# Patient Record
Sex: FEMALE | Race: WHITE | Employment: FULL TIME | URBAN - METROPOLITAN AREA
[De-identification: names, ages, dates, MRNs, and addresses within clinical notes are randomized per-mention and may not be internally consistent; named-entity substitution may affect disease eponyms.]

---

## 2017-01-03 ENCOUNTER — OFFICE VISIT (OUTPATIENT)
Dept: FAMILY MEDICINE CLINIC | Age: 62
End: 2017-01-03

## 2017-01-03 VITALS
WEIGHT: 251 LBS | HEART RATE: 72 BPM | TEMPERATURE: 97.6 F | DIASTOLIC BLOOD PRESSURE: 64 MMHG | SYSTOLIC BLOOD PRESSURE: 128 MMHG | BODY MASS INDEX: 40.34 KG/M2 | RESPIRATION RATE: 24 BRPM | HEIGHT: 66 IN

## 2017-01-03 DIAGNOSIS — E06.3 HYPOTHYROIDISM DUE TO HASHIMOTO'S THYROIDITIS: ICD-10-CM

## 2017-01-03 DIAGNOSIS — E03.8 HYPOTHYROIDISM DUE TO HASHIMOTO'S THYROIDITIS: ICD-10-CM

## 2017-01-03 DIAGNOSIS — E55.9 VITAMIN D DEFICIENCY: ICD-10-CM

## 2017-01-03 DIAGNOSIS — M05.711 RHEUMATOID ARTHRITIS INVOLVING RIGHT SHOULDER WITH POSITIVE RHEUMATOID FACTOR (HCC): Primary | ICD-10-CM

## 2017-01-03 DIAGNOSIS — I10 ESSENTIAL HYPERTENSION: ICD-10-CM

## 2017-01-03 PROCEDURE — 99213 OFFICE O/P EST LOW 20 MIN: CPT | Performed by: FAMILY MEDICINE

## 2017-01-03 RX ORDER — PREGABALIN 150 MG/1
CAPSULE ORAL
Qty: 270 CAPSULE | Refills: 1 | Status: SHIPPED | OUTPATIENT
Start: 2017-01-03 | End: 2017-06-14 | Stop reason: SDUPTHER

## 2017-01-03 RX ORDER — CELECOXIB 200 MG/1
CAPSULE ORAL
Qty: 180 CAPSULE | Refills: 1 | Status: SHIPPED | OUTPATIENT
Start: 2017-01-03 | End: 2017-07-20 | Stop reason: SDUPTHER

## 2017-01-03 RX ORDER — ERGOCALCIFEROL 1.25 MG/1
CAPSULE ORAL
Qty: 12 CAPSULE | Refills: 0 | Status: SHIPPED | OUTPATIENT
Start: 2017-01-03 | End: 2018-05-23 | Stop reason: SDUPTHER

## 2017-01-03 RX ORDER — IBUPROFEN 800 MG/1
TABLET ORAL
Qty: 90 TABLET | Refills: 1 | Status: SHIPPED | OUTPATIENT
Start: 2017-01-03 | End: 2017-08-19 | Stop reason: SDUPTHER

## 2017-01-03 RX ORDER — TRAMADOL HYDROCHLORIDE 50 MG/1
TABLET ORAL
Qty: 360 TABLET | Refills: 1 | Status: SHIPPED | OUTPATIENT
Start: 2017-01-03 | End: 2017-06-14 | Stop reason: SDUPTHER

## 2017-01-03 RX ORDER — HYDROXYCHLOROQUINE SULFATE 200 MG/1
TABLET, FILM COATED ORAL
Qty: 180 TABLET | Refills: 1 | Status: SHIPPED | OUTPATIENT
Start: 2017-01-03 | End: 2017-07-20 | Stop reason: SDUPTHER

## 2017-01-03 RX ORDER — ESTRADIOL 0.5 MG/1
TABLET ORAL
Qty: 90 TABLET | Refills: 1 | Status: SHIPPED | OUTPATIENT
Start: 2017-01-03 | End: 2017-07-20 | Stop reason: SDUPTHER

## 2017-01-03 RX ORDER — RANITIDINE 150 MG/1
TABLET ORAL
Qty: 180 TABLET | Refills: 1 | Status: SHIPPED | OUTPATIENT
Start: 2017-01-03 | End: 2017-07-20 | Stop reason: SDUPTHER

## 2017-01-03 RX ORDER — TRIAMTERENE AND HYDROCHLOROTHIAZIDE 75; 50 MG/1; MG/1
TABLET ORAL
Qty: 90 TABLET | Refills: 1 | Status: SHIPPED | OUTPATIENT
Start: 2017-01-03 | End: 2017-07-20 | Stop reason: SDUPTHER

## 2017-01-03 ASSESSMENT — ENCOUNTER SYMPTOMS
NAUSEA: 0
SINUS PAIN: 1
SWOLLEN GLANDS: 0
CONSTIPATION: 0
EYES NEGATIVE: 1
SORE THROAT: 1
ABDOMINAL PAIN: 0
SINUS PRESSURE: 1
VOICE CHANGE: 1
COUGH: 1
SHORTNESS OF BREATH: 0
TROUBLE SWALLOWING: 0
WHEEZING: 1
DIARRHEA: 0
VOMITING: 0
RHINORRHEA: 1

## 2017-01-03 ASSESSMENT — PATIENT HEALTH QUESTIONNAIRE - PHQ9
SUM OF ALL RESPONSES TO PHQ9 QUESTIONS 1 & 2: 1
2. FEELING DOWN, DEPRESSED OR HOPELESS: 0
1. LITTLE INTEREST OR PLEASURE IN DOING THINGS: 1
SUM OF ALL RESPONSES TO PHQ QUESTIONS 1-9: 1

## 2017-01-13 LAB
ALBUMIN SERPL-MCNC: NORMAL G/DL
ALP BLD-CCNC: NORMAL U/L
ALT SERPL-CCNC: 30 U/L
AST SERPL-CCNC: 29 U/L
BASOPHILS ABSOLUTE: NORMAL /ΜL
BASOPHILS RELATIVE PERCENT: NORMAL %
BILIRUB SERPL-MCNC: NORMAL MG/DL (ref 0.1–1.4)
BUN BLDV-MCNC: NORMAL MG/DL
CALCIUM SERPL-MCNC: NORMAL MG/DL
CHLORIDE BLD-SCNC: 101 MMOL/L
CHOLESTEROL, TOTAL: 149 MG/DL
CHOLESTEROL/HDL RATIO: 3.2
CO2: NORMAL MMOL/L
CREAT SERPL-MCNC: 0.96 MG/DL
EOSINOPHILS ABSOLUTE: NORMAL /ΜL
EOSINOPHILS RELATIVE PERCENT: NORMAL %
GFR CALCULATED: NORMAL
GLUCOSE BLD-MCNC: 93 MG/DL
HCT VFR BLD CALC: 42.5 % (ref 36–46)
HDLC SERPL-MCNC: 46 MG/DL (ref 35–70)
HEMOGLOBIN: 13.3 G/DL (ref 12–16)
LDL CHOLESTEROL CALCULATED: 78 MG/DL (ref 0–160)
LYMPHOCYTES ABSOLUTE: NORMAL /ΜL
LYMPHOCYTES RELATIVE PERCENT: NORMAL %
MCH RBC QN AUTO: NORMAL PG
MCHC RBC AUTO-ENTMCNC: NORMAL G/DL
MCV RBC AUTO: NORMAL FL
MONOCYTES ABSOLUTE: NORMAL /ΜL
MONOCYTES RELATIVE PERCENT: NORMAL %
NEUTROPHILS ABSOLUTE: NORMAL /ΜL
NEUTROPHILS RELATIVE PERCENT: NORMAL %
PDW BLD-RTO: NORMAL %
PLATELET # BLD: NORMAL K/ΜL
PMV BLD AUTO: NORMAL FL
POTASSIUM SERPL-SCNC: 3.6 MMOL/L
RBC # BLD: 4.83 10^6/ΜL
SODIUM BLD-SCNC: 140 MMOL/L
T4 FREE: 0.7
TOTAL PROTEIN: NORMAL
TRIGL SERPL-MCNC: 125 MG/DL
TSH SERPL DL<=0.05 MIU/L-ACNC: 2.43 UIU/ML
VITAMIN D 25-HYDROXY: 26
VITAMIN D2, 25 HYDROXY: NORMAL
VITAMIN D3,25 HYDROXY: NORMAL
VLDLC SERPL CALC-MCNC: 25 MG/DL
WBC # BLD: 5.6 10^3/ML

## 2017-01-19 ENCOUNTER — OFFICE VISIT (OUTPATIENT)
Dept: FAMILY MEDICINE CLINIC | Age: 62
End: 2017-01-19

## 2017-01-19 VITALS
DIASTOLIC BLOOD PRESSURE: 70 MMHG | SYSTOLIC BLOOD PRESSURE: 126 MMHG | HEIGHT: 66 IN | TEMPERATURE: 98.2 F | RESPIRATION RATE: 16 BRPM | HEART RATE: 60 BPM

## 2017-01-19 DIAGNOSIS — G89.29 CHRONIC BILATERAL LOW BACK PAIN WITHOUT SCIATICA: Primary | ICD-10-CM

## 2017-01-19 DIAGNOSIS — M05.711 RHEUMATOID ARTHRITIS INVOLVING RIGHT SHOULDER WITH POSITIVE RHEUMATOID FACTOR (HCC): ICD-10-CM

## 2017-01-19 DIAGNOSIS — M54.50 CHRONIC BILATERAL LOW BACK PAIN WITHOUT SCIATICA: Primary | ICD-10-CM

## 2017-01-19 DIAGNOSIS — M25.551 PAIN OF RIGHT HIP JOINT: ICD-10-CM

## 2017-01-19 DIAGNOSIS — I10 ESSENTIAL HYPERTENSION: ICD-10-CM

## 2017-01-19 PROCEDURE — 99214 OFFICE O/P EST MOD 30 MIN: CPT | Performed by: FAMILY MEDICINE

## 2017-01-19 RX ORDER — PREDNISONE 10 MG/1
TABLET ORAL
Qty: 62 TABLET | Refills: 0 | Status: SHIPPED | OUTPATIENT
Start: 2017-01-19 | End: 2017-06-14 | Stop reason: ALTCHOICE

## 2017-01-19 RX ORDER — LEVOTHYROXINE SODIUM 0.05 MG/1
50 TABLET ORAL DAILY
Qty: 90 TABLET | Refills: 1 | Status: SHIPPED | OUTPATIENT
Start: 2017-01-19 | End: 2017-07-20 | Stop reason: SDUPTHER

## 2017-01-24 ENCOUNTER — TELEPHONE (OUTPATIENT)
Dept: FAMILY MEDICINE CLINIC | Age: 62
End: 2017-01-24

## 2017-01-25 DIAGNOSIS — M54.5 LOW BACK PAIN, UNSPECIFIED BACK PAIN LATERALITY, UNSPECIFIED CHRONICITY, WITH SCIATICA PRESENCE UNSPECIFIED: Primary | ICD-10-CM

## 2017-01-25 DIAGNOSIS — M25.559 ARTHRALGIA OF HIP, UNSPECIFIED LATERALITY: ICD-10-CM

## 2017-02-01 DIAGNOSIS — M05.711 RHEUMATOID ARTHRITIS INVOLVING RIGHT SHOULDER WITH POSITIVE RHEUMATOID FACTOR (HCC): ICD-10-CM

## 2017-02-01 DIAGNOSIS — E55.9 VITAMIN D DEFICIENCY: ICD-10-CM

## 2017-02-01 DIAGNOSIS — E06.3 HYPOTHYROIDISM DUE TO HASHIMOTO'S THYROIDITIS: ICD-10-CM

## 2017-02-01 DIAGNOSIS — M25.559 ARTHRALGIA OF HIP, UNSPECIFIED LATERALITY: ICD-10-CM

## 2017-02-01 DIAGNOSIS — I10 ESSENTIAL HYPERTENSION: ICD-10-CM

## 2017-02-01 DIAGNOSIS — M54.5 LOW BACK PAIN, UNSPECIFIED BACK PAIN LATERALITY, UNSPECIFIED CHRONICITY, WITH SCIATICA PRESENCE UNSPECIFIED: ICD-10-CM

## 2017-02-01 DIAGNOSIS — E03.8 HYPOTHYROIDISM DUE TO HASHIMOTO'S THYROIDITIS: ICD-10-CM

## 2017-02-06 RX ORDER — DULOXETIN HYDROCHLORIDE 30 MG/1
CAPSULE, DELAYED RELEASE ORAL
Qty: 30 CAPSULE | Refills: 3 | Status: SHIPPED | OUTPATIENT
Start: 2017-02-06 | End: 2017-03-13 | Stop reason: SDUPTHER

## 2017-02-07 ENCOUNTER — TELEPHONE (OUTPATIENT)
Dept: FAMILY MEDICINE CLINIC | Age: 62
End: 2017-02-07

## 2017-02-09 PROBLEM — M70.61 TROCHANTERIC BURSITIS OF RIGHT HIP: Status: ACTIVE | Noted: 2017-02-09

## 2017-03-13 RX ORDER — DULOXETIN HYDROCHLORIDE 30 MG/1
CAPSULE, DELAYED RELEASE ORAL
Qty: 90 CAPSULE | Refills: 1 | Status: SHIPPED | OUTPATIENT
Start: 2017-03-13 | End: 2017-09-19 | Stop reason: SDUPTHER

## 2017-04-11 PROBLEM — M51.36 DEGENERATION OF LUMBAR INTERVERTEBRAL DISC: Status: ACTIVE | Noted: 2017-04-11

## 2017-04-11 PROBLEM — M51.369 DEGENERATION OF LUMBAR INTERVERTEBRAL DISC: Status: ACTIVE | Noted: 2017-04-11

## 2017-04-11 PROBLEM — M47.817 LUMBOSACRAL SPONDYLOSIS WITHOUT MYELOPATHY: Status: ACTIVE | Noted: 2017-04-11

## 2017-05-08 ENCOUNTER — TELEPHONE (OUTPATIENT)
Dept: FAMILY MEDICINE CLINIC | Age: 62
End: 2017-05-08

## 2017-06-12 RX ORDER — TRAMADOL HYDROCHLORIDE 50 MG/1
TABLET ORAL
Qty: 360 TABLET | Refills: 1 | OUTPATIENT
Start: 2017-06-12

## 2017-06-14 ENCOUNTER — OFFICE VISIT (OUTPATIENT)
Dept: FAMILY MEDICINE CLINIC | Age: 62
End: 2017-06-14

## 2017-06-14 VITALS
DIASTOLIC BLOOD PRESSURE: 70 MMHG | OXYGEN SATURATION: 96 % | TEMPERATURE: 97 F | HEART RATE: 70 BPM | HEIGHT: 66 IN | SYSTOLIC BLOOD PRESSURE: 120 MMHG

## 2017-06-14 DIAGNOSIS — M47.817 LUMBOSACRAL SPONDYLOSIS WITHOUT MYELOPATHY: ICD-10-CM

## 2017-06-14 DIAGNOSIS — Z51.81 THERAPEUTIC DRUG MONITORING: Primary | ICD-10-CM

## 2017-06-14 DIAGNOSIS — G89.29 CHRONIC LOW BACK PAIN, UNSPECIFIED BACK PAIN LATERALITY, WITH SCIATICA PRESENCE UNSPECIFIED: ICD-10-CM

## 2017-06-14 DIAGNOSIS — M05.711 RHEUMATOID ARTHRITIS INVOLVING RIGHT SHOULDER WITH POSITIVE RHEUMATOID FACTOR (HCC): ICD-10-CM

## 2017-06-14 DIAGNOSIS — M51.36 DEGENERATION OF LUMBAR INTERVERTEBRAL DISC: ICD-10-CM

## 2017-06-14 DIAGNOSIS — M54.5 CHRONIC LOW BACK PAIN, UNSPECIFIED BACK PAIN LATERALITY, WITH SCIATICA PRESENCE UNSPECIFIED: ICD-10-CM

## 2017-06-14 PROCEDURE — 99214 OFFICE O/P EST MOD 30 MIN: CPT | Performed by: FAMILY MEDICINE

## 2017-06-14 RX ORDER — PREGABALIN 150 MG/1
CAPSULE ORAL
Qty: 270 CAPSULE | Refills: 1 | Status: SHIPPED | OUTPATIENT
Start: 2017-06-14 | End: 2018-01-23 | Stop reason: SDUPTHER

## 2017-06-14 RX ORDER — TRAMADOL HYDROCHLORIDE 50 MG/1
TABLET ORAL
Qty: 360 TABLET | Refills: 1 | Status: SHIPPED | OUTPATIENT
Start: 2017-06-14 | End: 2017-10-18 | Stop reason: SDUPTHER

## 2017-06-14 RX ORDER — HYDROCODONE BITARTRATE AND ACETAMINOPHEN 5; 325 MG/1; MG/1
TABLET ORAL
Refills: 0 | COMMUNITY
Start: 2017-05-10

## 2017-07-20 DIAGNOSIS — I10 ESSENTIAL HYPERTENSION: ICD-10-CM

## 2017-07-20 RX ORDER — TRIAMTERENE AND HYDROCHLOROTHIAZIDE 75; 50 MG/1; MG/1
TABLET ORAL
Qty: 90 TABLET | Refills: 1 | Status: SHIPPED | OUTPATIENT
Start: 2017-07-20 | End: 2018-01-23 | Stop reason: SDUPTHER

## 2017-07-20 RX ORDER — ESTRADIOL 0.5 MG/1
TABLET ORAL
Qty: 90 TABLET | Refills: 1 | Status: SHIPPED | OUTPATIENT
Start: 2017-07-20 | End: 2018-02-05 | Stop reason: SDUPTHER

## 2017-07-20 RX ORDER — CELECOXIB 200 MG/1
CAPSULE ORAL
Qty: 180 CAPSULE | Refills: 1 | Status: SHIPPED | OUTPATIENT
Start: 2017-07-20 | End: 2018-01-23 | Stop reason: SDUPTHER

## 2017-07-20 RX ORDER — LEVOTHYROXINE SODIUM 0.05 MG/1
TABLET ORAL
Qty: 90 TABLET | Refills: 1 | Status: SHIPPED | OUTPATIENT
Start: 2017-07-20 | End: 2018-02-21 | Stop reason: SDUPTHER

## 2017-07-20 RX ORDER — HYDROXYCHLOROQUINE SULFATE 200 MG/1
TABLET, FILM COATED ORAL
Qty: 180 TABLET | Refills: 1 | Status: SHIPPED | OUTPATIENT
Start: 2017-07-20 | End: 2018-01-23 | Stop reason: SDUPTHER

## 2017-07-20 RX ORDER — RANITIDINE 150 MG/1
TABLET ORAL
Qty: 180 TABLET | Refills: 1 | Status: SHIPPED | OUTPATIENT
Start: 2017-07-20 | End: 2018-02-05 | Stop reason: SDUPTHER

## 2017-07-24 DIAGNOSIS — Z51.81 THERAPEUTIC DRUG MONITORING: ICD-10-CM

## 2017-08-21 RX ORDER — IBUPROFEN 800 MG/1
TABLET ORAL
Qty: 90 TABLET | Refills: 0 | Status: SHIPPED | OUTPATIENT
Start: 2017-08-21 | End: 2017-10-18 | Stop reason: SDUPTHER

## 2017-09-20 RX ORDER — DULOXETIN HYDROCHLORIDE 30 MG/1
CAPSULE, DELAYED RELEASE ORAL
Qty: 90 CAPSULE | Refills: 0 | Status: SHIPPED | OUTPATIENT
Start: 2017-09-20 | End: 2017-12-19 | Stop reason: SDUPTHER

## 2017-10-18 ENCOUNTER — OFFICE VISIT (OUTPATIENT)
Dept: FAMILY MEDICINE CLINIC | Age: 62
End: 2017-10-18

## 2017-10-18 VITALS
HEART RATE: 76 BPM | SYSTOLIC BLOOD PRESSURE: 130 MMHG | RESPIRATION RATE: 12 BRPM | HEIGHT: 66 IN | TEMPERATURE: 96.8 F | DIASTOLIC BLOOD PRESSURE: 84 MMHG

## 2017-10-18 DIAGNOSIS — M51.36 DEGENERATION OF LUMBAR INTERVERTEBRAL DISC: ICD-10-CM

## 2017-10-18 DIAGNOSIS — M47.817 LUMBOSACRAL SPONDYLOSIS WITHOUT MYELOPATHY: ICD-10-CM

## 2017-10-18 DIAGNOSIS — M05.711 RHEUMATOID ARTHRITIS INVOLVING RIGHT SHOULDER WITH POSITIVE RHEUMATOID FACTOR (HCC): ICD-10-CM

## 2017-10-18 DIAGNOSIS — Z12.31 ENCOUNTER FOR SCREENING MAMMOGRAM FOR BREAST CANCER: ICD-10-CM

## 2017-10-18 DIAGNOSIS — R00.2 HEART PALPITATIONS: Primary | ICD-10-CM

## 2017-10-18 PROCEDURE — 99214 OFFICE O/P EST MOD 30 MIN: CPT | Performed by: FAMILY MEDICINE

## 2017-10-18 PROCEDURE — 93000 ELECTROCARDIOGRAM COMPLETE: CPT | Performed by: FAMILY MEDICINE

## 2017-10-18 RX ORDER — IBUPROFEN 800 MG/1
TABLET ORAL
Qty: 90 TABLET | Refills: 1 | Status: SHIPPED | OUTPATIENT
Start: 2017-10-18 | End: 2019-02-16 | Stop reason: SDUPTHER

## 2017-10-18 RX ORDER — TRAMADOL HYDROCHLORIDE 50 MG/1
TABLET ORAL
Qty: 360 TABLET | Refills: 1 | Status: SHIPPED | OUTPATIENT
Start: 2017-10-18 | End: 2018-04-25 | Stop reason: SDUPTHER

## 2017-10-18 NOTE — PROGRESS NOTES
REVIEW OF SYSTEMS:   Patient seen today for exam.  Denies any problems with hearing, headaches or vision. Denies any shortness of breath, chest pain, nausea or vomiting. No black stool, no blood in the stool. No heartburn. Denies any problems with constipation or diarrhea either. No dysuria type symptoms. Objective:     /84 (Site: Left Arm, Position: Sitting, Cuff Size: Large Adult)   Pulse 76   Temp 96.8 °F (36 °C) (Temporal)   Resp 12   Ht 5' 6\" (1.676 m)     Physical Exam      O:  Alert and active female in no acute distress  HEENT:  TMs clear. Pharynx neg. Nares clear, no drainage noted  Neck supple/ no adenopathy   HEART:  RRR without murmur/ no carotid bruits  LUNGS:  Clear to auscultation bilaterally, no wheeze or rhonchi noted  THYROID: neg masses or nodularity  ABDOMEN:  Soft x4. Bowel sounds positive. No masses or organomegaly,  Negative tenderness, guarding or rebound. EXTR:  Without edema./ good pulses bilat    Neurologic exam unremarkable. DTRs in upper and lower extremities within normal limits. Full strength noted    Skin- no lesions noted       Assessment:      1. Heart palpitations  EKG 12 Lead    CANCELED: EKG 12 lead   2. Rheumatoid arthritis involving right shoulder with positive rheumatoid factor (HCC)  ibuprofen (ADVIL;MOTRIN) 800 MG tablet    traMADol (ULTRAM) 50 MG tablet   3. Lumbosacral spondylosis without myelopathy  ibuprofen (ADVIL;MOTRIN) 800 MG tablet    traMADol (ULTRAM) 50 MG tablet   4. Degeneration of lumbar intervertebral disc  ibuprofen (ADVIL;MOTRIN) 800 MG tablet    traMADol (ULTRAM) 50 MG tablet   5.  Encounter for screening mammogram for breast cancer  LUANN DIGITAL SCREEN W OR WO CAD BILATERAL             Plan:        Orders Placed This Encounter   Medications    ibuprofen (ADVIL;MOTRIN) 800 MG tablet     Sig: TAKE ONE (1) TABLET BY MOUTH EVERY 8 HOURS IF NEEDED     Dispense:  90 tablet     Refill:  1    traMADol (ULTRAM) 50 MG tablet     Sig: TAKE 1 TO 2 TABLETS BY MOUTH EVERY 4 TO 6 HOURS     Dispense:  360 tablet     Refill:  1     Orders Placed This Encounter   Procedures    LUANN DIGITAL SCREEN W OR WO CAD BILATERAL     Standing Status:   Future     Standing Expiration Date:   12/18/2018     Order Specific Question:   Reason for exam:     Answer:   screening    EKG 12 Lead     Order Specific Question:   Reason for Exam?     Answer: Other           Health Maintenance Due   Topic Date Due    Cervical cancer screen  11/26/2015    Zostavax vaccine  11/20/2016         If she has continued palpitations she will need a    Controlled Substances Monitoring:     Medication Contracts: Existing medication contract.  Rell Leon MA)        If anything worsens or changes please call us at once , follow-up in the office as planned,

## 2017-10-24 RX ORDER — ERGOCALCIFEROL 1.25 MG/1
CAPSULE ORAL
Qty: 12 CAPSULE | Refills: 3 | Status: SHIPPED | OUTPATIENT
Start: 2017-10-24 | End: 2018-12-06 | Stop reason: SDUPTHER

## 2017-12-20 RX ORDER — DULOXETIN HYDROCHLORIDE 30 MG/1
CAPSULE, DELAYED RELEASE ORAL
Qty: 90 CAPSULE | Refills: 0 | Status: SHIPPED | OUTPATIENT
Start: 2017-12-20 | End: 2018-04-02 | Stop reason: SDUPTHER

## 2018-01-23 DIAGNOSIS — I10 ESSENTIAL HYPERTENSION: ICD-10-CM

## 2018-01-24 RX ORDER — HYDROXYCHLOROQUINE SULFATE 200 MG/1
TABLET, FILM COATED ORAL
Qty: 180 TABLET | Refills: 1 | Status: SHIPPED | OUTPATIENT
Start: 2018-01-24 | End: 2018-08-01 | Stop reason: SDUPTHER

## 2018-01-24 RX ORDER — CELECOXIB 200 MG/1
CAPSULE ORAL
Qty: 180 CAPSULE | Refills: 1 | Status: SHIPPED | OUTPATIENT
Start: 2018-01-24 | End: 2018-08-01 | Stop reason: SDUPTHER

## 2018-01-24 RX ORDER — PREGABALIN 150 MG/1
CAPSULE ORAL
Qty: 270 CAPSULE | Refills: 1 | OUTPATIENT
Start: 2018-01-24 | End: 2018-09-05 | Stop reason: SDUPTHER

## 2018-01-24 RX ORDER — TRIAMTERENE AND HYDROCHLOROTHIAZIDE 75; 50 MG/1; MG/1
TABLET ORAL
Qty: 90 TABLET | Refills: 1 | Status: SHIPPED | OUTPATIENT
Start: 2018-01-24 | End: 2018-08-01 | Stop reason: SDUPTHER

## 2018-02-06 RX ORDER — ESTRADIOL 0.5 MG/1
TABLET ORAL
Qty: 90 TABLET | Refills: 1 | Status: SHIPPED | OUTPATIENT
Start: 2018-02-06 | End: 2018-09-04 | Stop reason: SDUPTHER

## 2018-02-06 RX ORDER — RANITIDINE 150 MG/1
TABLET ORAL
Qty: 180 TABLET | Refills: 1 | Status: SHIPPED | OUTPATIENT
Start: 2018-02-06 | End: 2018-09-04 | Stop reason: SDUPTHER

## 2018-02-21 RX ORDER — LEVOTHYROXINE SODIUM 0.05 MG/1
TABLET ORAL
Qty: 90 TABLET | Refills: 0 | Status: SHIPPED | OUTPATIENT
Start: 2018-02-21 | End: 2018-05-29 | Stop reason: SDUPTHER

## 2018-03-31 ENCOUNTER — OFFICE VISIT (OUTPATIENT)
Dept: FAMILY MEDICINE CLINIC | Age: 63
End: 2018-03-31
Payer: COMMERCIAL

## 2018-03-31 VITALS
BODY MASS INDEX: 39.62 KG/M2 | WEIGHT: 246.5 LBS | DIASTOLIC BLOOD PRESSURE: 82 MMHG | HEIGHT: 66 IN | RESPIRATION RATE: 18 BRPM | HEART RATE: 67 BPM | TEMPERATURE: 97.6 F | SYSTOLIC BLOOD PRESSURE: 132 MMHG

## 2018-03-31 DIAGNOSIS — J40 BRONCHITIS: ICD-10-CM

## 2018-03-31 DIAGNOSIS — J01.01 ACUTE RECURRENT MAXILLARY SINUSITIS: Primary | ICD-10-CM

## 2018-03-31 PROCEDURE — 99213 OFFICE O/P EST LOW 20 MIN: CPT | Performed by: FAMILY MEDICINE

## 2018-03-31 RX ORDER — CEFDINIR 300 MG/1
600 CAPSULE ORAL DAILY
Qty: 20 CAPSULE | Refills: 0 | Status: SHIPPED | OUTPATIENT
Start: 2018-03-31 | End: 2018-04-10

## 2018-03-31 RX ORDER — PROMETHAZINE HYDROCHLORIDE AND PHENYLEPHRINE HYDROCHLORIDE 6.25; 5 MG/5ML; MG/5ML
5 SYRUP ORAL EVERY 6 HOURS
Qty: 118 ML | Refills: 2 | Status: SHIPPED | OUTPATIENT
Start: 2018-03-31 | End: 2018-09-05 | Stop reason: ALTCHOICE

## 2018-03-31 ASSESSMENT — PATIENT HEALTH QUESTIONNAIRE - PHQ9
SUM OF ALL RESPONSES TO PHQ9 QUESTIONS 1 & 2: 0
1. LITTLE INTEREST OR PLEASURE IN DOING THINGS: 0
SUM OF ALL RESPONSES TO PHQ QUESTIONS 1-9: 0
2. FEELING DOWN, DEPRESSED OR HOPELESS: 0

## 2018-04-02 RX ORDER — DULOXETIN HYDROCHLORIDE 30 MG/1
CAPSULE, DELAYED RELEASE ORAL
Qty: 90 CAPSULE | Refills: 1 | Status: SHIPPED | OUTPATIENT
Start: 2018-04-02 | End: 2018-09-05 | Stop reason: SDUPTHER

## 2018-04-25 DIAGNOSIS — M05.711 RHEUMATOID ARTHRITIS INVOLVING RIGHT SHOULDER WITH POSITIVE RHEUMATOID FACTOR (HCC): ICD-10-CM

## 2018-04-25 DIAGNOSIS — M47.817 LUMBOSACRAL SPONDYLOSIS WITHOUT MYELOPATHY: ICD-10-CM

## 2018-04-25 DIAGNOSIS — M51.36 DEGENERATION OF LUMBAR INTERVERTEBRAL DISC: ICD-10-CM

## 2018-04-25 DIAGNOSIS — I10 ESSENTIAL HYPERTENSION: ICD-10-CM

## 2018-04-25 RX ORDER — TRAMADOL HYDROCHLORIDE 50 MG/1
TABLET ORAL
Qty: 360 TABLET | Refills: 0 | Status: SHIPPED | OUTPATIENT
Start: 2018-04-25 | End: 2018-08-09 | Stop reason: SDUPTHER

## 2018-05-23 ENCOUNTER — OFFICE VISIT (OUTPATIENT)
Dept: FAMILY MEDICINE CLINIC | Age: 63
End: 2018-05-23
Payer: COMMERCIAL

## 2018-05-23 VITALS
DIASTOLIC BLOOD PRESSURE: 82 MMHG | WEIGHT: 242.8 LBS | TEMPERATURE: 97.2 F | RESPIRATION RATE: 16 BRPM | SYSTOLIC BLOOD PRESSURE: 124 MMHG | HEIGHT: 66 IN | BODY MASS INDEX: 39.02 KG/M2 | OXYGEN SATURATION: 96 % | HEART RATE: 69 BPM

## 2018-05-23 DIAGNOSIS — M05.711 RHEUMATOID ARTHRITIS INVOLVING RIGHT SHOULDER WITH POSITIVE RHEUMATOID FACTOR (HCC): ICD-10-CM

## 2018-05-23 DIAGNOSIS — I10 ESSENTIAL HYPERTENSION: ICD-10-CM

## 2018-05-23 DIAGNOSIS — Z01.818 PREOP EXAMINATION: Primary | ICD-10-CM

## 2018-05-23 DIAGNOSIS — M51.36 DEGENERATION OF LUMBAR INTERVERTEBRAL DISC: ICD-10-CM

## 2018-05-23 DIAGNOSIS — K43.2 INCISIONAL HERNIA, WITHOUT OBSTRUCTION OR GANGRENE: ICD-10-CM

## 2018-05-23 PROCEDURE — 93000 ELECTROCARDIOGRAM COMPLETE: CPT | Performed by: FAMILY MEDICINE

## 2018-05-23 PROCEDURE — 99243 OFF/OP CNSLTJ NEW/EST LOW 30: CPT | Performed by: FAMILY MEDICINE

## 2018-05-30 RX ORDER — LEVOTHYROXINE SODIUM 0.05 MG/1
TABLET ORAL
Qty: 90 TABLET | Refills: 0 | Status: SHIPPED | OUTPATIENT
Start: 2018-05-30 | End: 2018-09-04 | Stop reason: SDUPTHER

## 2018-06-01 DIAGNOSIS — Z01.818 PREOP EXAMINATION: ICD-10-CM

## 2018-08-01 DIAGNOSIS — I10 ESSENTIAL HYPERTENSION: ICD-10-CM

## 2018-08-01 RX ORDER — HYDROXYCHLOROQUINE SULFATE 200 MG/1
TABLET, FILM COATED ORAL
Qty: 180 TABLET | Refills: 1 | Status: SHIPPED | OUTPATIENT
Start: 2018-08-01 | End: 2019-02-04 | Stop reason: SDUPTHER

## 2018-08-01 RX ORDER — CELECOXIB 200 MG/1
CAPSULE ORAL
Qty: 180 CAPSULE | Refills: 1 | Status: SHIPPED | OUTPATIENT
Start: 2018-08-01 | End: 2019-02-16 | Stop reason: SDUPTHER

## 2018-08-01 RX ORDER — TRIAMTERENE AND HYDROCHLOROTHIAZIDE 75; 50 MG/1; MG/1
TABLET ORAL
Qty: 90 TABLET | Refills: 1 | Status: SHIPPED | OUTPATIENT
Start: 2018-08-01

## 2018-08-09 DIAGNOSIS — M47.817 LUMBOSACRAL SPONDYLOSIS WITHOUT MYELOPATHY: ICD-10-CM

## 2018-08-09 DIAGNOSIS — M05.711 RHEUMATOID ARTHRITIS INVOLVING RIGHT SHOULDER WITH POSITIVE RHEUMATOID FACTOR (HCC): ICD-10-CM

## 2018-08-09 DIAGNOSIS — M51.36 DEGENERATION OF LUMBAR INTERVERTEBRAL DISC: ICD-10-CM

## 2018-08-09 RX ORDER — TRAMADOL HYDROCHLORIDE 50 MG/1
TABLET ORAL
Qty: 360 TABLET | Refills: 0 | OUTPATIENT
Start: 2018-08-09 | End: 2018-11-07

## 2018-08-09 NOTE — TELEPHONE ENCOUNTER
Medication: tramadol    Last Office Visit: 5-23-18    Last filled: 4-25-18    Last Signed Contract: 7-24-17    Last Utox: 6-14-17    Last OARRS:  7-13-15

## 2018-09-04 DIAGNOSIS — E03.9 HYPOTHYROIDISM, UNSPECIFIED TYPE: Primary | ICD-10-CM

## 2018-09-04 DIAGNOSIS — M47.817 LUMBOSACRAL SPONDYLOSIS WITHOUT MYELOPATHY: ICD-10-CM

## 2018-09-04 DIAGNOSIS — M05.711 RHEUMATOID ARTHRITIS INVOLVING RIGHT SHOULDER WITH POSITIVE RHEUMATOID FACTOR (HCC): ICD-10-CM

## 2018-09-04 DIAGNOSIS — M51.36 DEGENERATION OF LUMBAR INTERVERTEBRAL DISC: ICD-10-CM

## 2018-09-04 RX ORDER — LEVOTHYROXINE SODIUM 0.05 MG/1
TABLET ORAL
Qty: 90 TABLET | Refills: 1 | Status: SHIPPED | OUTPATIENT
Start: 2018-09-04 | End: 2019-03-15 | Stop reason: SDUPTHER

## 2018-09-04 RX ORDER — ESTRADIOL 0.5 MG/1
TABLET ORAL
Qty: 90 TABLET | Refills: 1 | Status: SHIPPED | OUTPATIENT
Start: 2018-09-04 | End: 2019-03-15 | Stop reason: SDUPTHER

## 2018-09-04 RX ORDER — RANITIDINE 150 MG/1
TABLET ORAL
Qty: 180 TABLET | Refills: 1 | Status: SHIPPED | OUTPATIENT
Start: 2018-09-04

## 2018-09-04 NOTE — TELEPHONE ENCOUNTER
Medication: estradiol, levothyroxine, ranitidine    Last Office Visit: 5-23-18    Last Labs: 5-29-18    Last Filled: 2-6-18, 5-30-18

## 2018-09-05 ENCOUNTER — OFFICE VISIT (OUTPATIENT)
Dept: FAMILY MEDICINE CLINIC | Age: 63
End: 2018-09-05
Payer: COMMERCIAL

## 2018-09-05 VITALS
OXYGEN SATURATION: 97 % | SYSTOLIC BLOOD PRESSURE: 130 MMHG | HEIGHT: 66 IN | BODY MASS INDEX: 39.19 KG/M2 | TEMPERATURE: 96.3 F | HEART RATE: 73 BPM | DIASTOLIC BLOOD PRESSURE: 82 MMHG

## 2018-09-05 DIAGNOSIS — M05.711 RHEUMATOID ARTHRITIS INVOLVING RIGHT SHOULDER WITH POSITIVE RHEUMATOID FACTOR (HCC): ICD-10-CM

## 2018-09-05 DIAGNOSIS — M79.605 LEFT LEG PAIN: Primary | ICD-10-CM

## 2018-09-05 DIAGNOSIS — M47.817 LUMBOSACRAL SPONDYLOSIS WITHOUT MYELOPATHY: ICD-10-CM

## 2018-09-05 PROCEDURE — 99213 OFFICE O/P EST LOW 20 MIN: CPT | Performed by: FAMILY MEDICINE

## 2018-09-05 RX ORDER — PREGABALIN 150 MG/1
CAPSULE ORAL
Qty: 270 CAPSULE | Refills: 1 | Status: SHIPPED | OUTPATIENT
Start: 2018-09-05 | End: 2019-01-07

## 2018-09-05 RX ORDER — DULOXETIN HYDROCHLORIDE 60 MG/1
60 CAPSULE, DELAYED RELEASE ORAL DAILY
Qty: 30 CAPSULE | Refills: 2 | Status: SHIPPED | OUTPATIENT
Start: 2018-09-05 | End: 2019-01-07 | Stop reason: SDUPTHER

## 2018-09-05 NOTE — PROGRESS NOTES
Subjective:      Patient ID: Quinn Rabago is a 65 y.o. female. Chief Complaint   Patient presents with    Leg Pain     Left leg X3 weeks, denies injury, numbness is present constantly worse with weight bearing. Has tried no treatments. HPI    Here today follow-up on her left leg pain and refill her tramadol and Lyrica doing pretty well except his pain is, and numbness tingling also like a wet feeling in her left quadrant area she denies fevers or chills nausea vomiting hasn't been exercising has been watching her diet doing okay otherwise  Allergies   Allergen Reactions    Codeine Rash    Percocet [Oxycodone-Acetaminophen] Rash     Outpatient Encounter Prescriptions as of 9/5/2018   Medication Sig Dispense Refill    pregabalin (LYRICA) 150 MG capsule TAKE ONE (1) CAPSULE BY MOUTH THREE (3) TIMES DAILY.  270 capsule 1    DULoxetine (CYMBALTA) 60 MG extended release capsule Take 1 capsule by mouth daily 30 capsule 2    estradiol (ESTRACE) 0.5 MG tablet TAKE ONE (1) TABLET BY MOUTH ONCE DAILY 90 tablet 1    ranitidine (ZANTAC) 150 MG tablet TAKE ONE (1) TABLET BY MOUTH TWO (2) TIMES A  tablet 1    levothyroxine (SYNTHROID) 50 MCG tablet TAKE ONE (1) TABLET BY MOUTH ONCE DAILY 90 tablet 1    traMADol (ULTRAM) 50 MG tablet TAKE 1 TO 2 TABLETS BY MOUTH EVERY 4 TO 6 HOURS. 360 tablet 0    celecoxib (CELEBREX) 200 MG capsule TAKE ONE (1) CAPSULE BY MOUTH TWO (2) TIMES A  capsule 1    triamterene-hydrochlorothiazide (MAXZIDE) 75-50 MG per tablet TAKE ONE (1) TABLET BY MOUTH ONCE DAILY 90 tablet 1    hydroxychloroquine (PLAQUENIL) 200 MG tablet TAKE ONE (1) TABLET BY MOUTH TWO (2) TIMES A  tablet 1    vitamin D (ERGOCALCIFEROL) 18600 units CAPS capsule TAKE ONE (1) CAPSULE BY MOUTH ONCE A WEEK 12 capsule 3    ibuprofen (ADVIL;MOTRIN) 800 MG tablet TAKE ONE (1) TABLET BY MOUTH EVERY 8 HOURS IF NEEDED 90 tablet 1    HYDROcodone-acetaminophen (NORCO) 5-325 MG per tablet TAKE 1 OR 2 TABLETS BY MOUTH EVERY 4 TO 6 HOURS AS NEEDED  0    metroNIDAZOLE (METROGEL) 1 % gel Apply topically daily 60 g 2    [DISCONTINUED] DULoxetine (CYMBALTA) 30 MG extended release capsule TAKE ONE (1) CAPSULE BY MOUTH ONCE DAILY 90 capsule 1    [DISCONTINUED] Promethazine-Phenylephrine (PHENERGAN-VC) 6.25-5 MG/5ML syrup Take 5 mLs by mouth every 6 hours 118 mL 2    [DISCONTINUED] pregabalin (LYRICA) 150 MG capsule TAKE ONE (1) CAPSULE BY MOUTH THREE (3) TIMES DAILY. 270 capsule 1     No facility-administered encounter medications on file as of 9/5/2018. Social History     Social History    Marital status:      Spouse name: N/A    Number of children: N/A    Years of education: N/A     Occupational History    Not on file. Social History Main Topics    Smoking status: Former Smoker     Packs/day: 1.00     Years: 15.00     Quit date: 1/1/1984    Smokeless tobacco: Never Used    Alcohol use Not on file    Drug use: Unknown    Sexual activity: Not on file     Other Topics Concern    Not on file     Social History Narrative    No narrative on file     Family History   Problem Relation Age of Onset    Cancer Mother         cervical    High Blood Pressure Mother     Diabetes Father     High Blood Pressure Father     Diabetes Sister      Past Medical History:   Diagnosis Date    Chronic back pain     Hemorrhoid     HTN (hypertension)     Panic attacks     Rheumatoid arthritis(714.0)     Rosacea      Past Surgical History:   Procedure Laterality Date    CARPAL TUNNEL RELEASE      right    CHOLECYSTECTOMY  05/10/2017    GLAUCOMA SURGERY Bilateral 05/2017    Dr Belkis Sanders  06/12/2018    Abdominal/ umbilical    MOHS SURGERY  02/24/14    DR Nini Guzman    TUBAL LIGATION           REVIEW OF SYSTEMS:   REVIEW OF SYSTEMS:   Patient seen today for exam.  Denies any problems with hearing, headaches or vision.   Denies any shortness of breath, chest pain, nausea Controlled Substances Monitoring:     RX Monitoring 10/18/2017   Attestation -   Documentation -   Medication Contracts Existing medication contract.    She will continue medications will increase her Cymbalta and may help more most things, she'll use any rubs she likes        If anything worsens or changes please call us at once , follow-up in the office as planned,

## 2018-09-29 ENCOUNTER — OFFICE VISIT (OUTPATIENT)
Dept: FAMILY MEDICINE CLINIC | Age: 63
End: 2018-09-29
Payer: COMMERCIAL

## 2018-09-29 VITALS
SYSTOLIC BLOOD PRESSURE: 124 MMHG | HEART RATE: 75 BPM | BODY MASS INDEX: 39.15 KG/M2 | DIASTOLIC BLOOD PRESSURE: 80 MMHG | TEMPERATURE: 97.1 F | WEIGHT: 243.6 LBS | RESPIRATION RATE: 16 BRPM | HEIGHT: 66 IN | OXYGEN SATURATION: 98 %

## 2018-09-29 DIAGNOSIS — R10.32 LEFT LOWER QUADRANT PAIN: ICD-10-CM

## 2018-09-29 DIAGNOSIS — Z87.19 HISTORY OF DIVERTICULOSIS: ICD-10-CM

## 2018-09-29 DIAGNOSIS — K29.00 ACUTE GASTRITIS WITHOUT HEMORRHAGE, UNSPECIFIED GASTRITIS TYPE: Primary | ICD-10-CM

## 2018-09-29 DIAGNOSIS — R11.0 NAUSEA: ICD-10-CM

## 2018-09-29 LAB
INFLUENZA A ANTIBODY: NEGATIVE
INFLUENZA B ANTIBODY: NEGATIVE

## 2018-09-29 PROCEDURE — 99214 OFFICE O/P EST MOD 30 MIN: CPT | Performed by: NURSE PRACTITIONER

## 2018-09-29 PROCEDURE — 87804 INFLUENZA ASSAY W/OPTIC: CPT | Performed by: NURSE PRACTITIONER

## 2018-09-29 RX ORDER — OMEPRAZOLE 40 MG/1
40 CAPSULE, DELAYED RELEASE ORAL DAILY
Qty: 30 CAPSULE | Refills: 3 | Status: SHIPPED | OUTPATIENT
Start: 2018-09-29

## 2018-10-08 ASSESSMENT — ENCOUNTER SYMPTOMS
NAUSEA: 1
CHEST TIGHTNESS: 0
FLATUS: 0
BELCHING: 0
WHEEZING: 0
BLOOD IN STOOL: 0
CONSTIPATION: 0
HEMATOCHEZIA: 0
VOMITING: 0
RECTAL PAIN: 0
ABDOMINAL PAIN: 1
DIARRHEA: 1
ABDOMINAL DISTENTION: 0
ANAL BLEEDING: 0
SHORTNESS OF BREATH: 0

## 2018-10-09 NOTE — PROGRESS NOTES
proceed. Close follow up to evaluate treatment results and for coordination of care. I have reviewed the patient's medical history in detail and updated the computerized patient record.     Mitchell Khalil, ANDRE - CNP

## 2018-10-10 ENCOUNTER — OFFICE VISIT (OUTPATIENT)
Dept: FAMILY MEDICINE CLINIC | Age: 63
End: 2018-10-10
Payer: COMMERCIAL

## 2018-10-10 VITALS
DIASTOLIC BLOOD PRESSURE: 84 MMHG | HEART RATE: 86 BPM | BODY MASS INDEX: 39.32 KG/M2 | RESPIRATION RATE: 12 BRPM | OXYGEN SATURATION: 97 % | SYSTOLIC BLOOD PRESSURE: 126 MMHG | HEIGHT: 66 IN | TEMPERATURE: 96.8 F

## 2018-10-10 DIAGNOSIS — K57.92 DIVERTICULITIS: Primary | ICD-10-CM

## 2018-10-10 DIAGNOSIS — Z87.440 RECENT URINARY TRACT INFECTION: ICD-10-CM

## 2018-10-10 LAB
BILIRUBIN, POC: ABNORMAL
BLOOD URINE, POC: ABNORMAL
CLARITY, POC: CLEAR
COLOR, POC: YELLOW
GLUCOSE URINE, POC: ABNORMAL
KETONES, POC: ABNORMAL
LEUKOCYTE EST, POC: 500
NITRITE, POC: ABNORMAL
PH, POC: 8
PROTEIN, POC: ABNORMAL
SPECIFIC GRAVITY, POC: 1.01
UROBILINOGEN, POC: ABNORMAL

## 2018-10-10 PROCEDURE — 99213 OFFICE O/P EST LOW 20 MIN: CPT | Performed by: FAMILY MEDICINE

## 2018-10-10 PROCEDURE — 81003 URINALYSIS AUTO W/O SCOPE: CPT | Performed by: FAMILY MEDICINE

## 2018-10-10 RX ORDER — CIPROFLOXACIN 500 MG/1
500 TABLET, FILM COATED ORAL 2 TIMES DAILY
Qty: 20 TABLET | Refills: 0 | Status: SHIPPED | OUTPATIENT
Start: 2018-10-10 | End: 2018-10-20

## 2018-10-10 RX ORDER — METRONIDAZOLE 250 MG/1
250 TABLET ORAL 3 TIMES DAILY
Qty: 30 TABLET | Refills: 0 | Status: SHIPPED | OUTPATIENT
Start: 2018-10-10 | End: 2018-10-20

## 2018-10-10 RX ORDER — NITROFURANTOIN 25; 75 MG/1; MG/1
CAPSULE ORAL
Refills: 0 | COMMUNITY
Start: 2018-09-30

## 2018-10-10 NOTE — PROGRESS NOTES
Subjective:      Patient ID: Nathalia Mccall is a 65 y.o. female. Chief Complaint   Patient presents with    Abdominal Pain     Went to [facility] for this and was sent to [facility] for ultrasound. UA was done and was diagnosed with a UTI. She reports that her bowels are loose and she is having some rectal leakage while passing gas. Was advised to take fiber to help bulk up her stool and started these yeaterday. HPI    Here today follow-up on her abdominal pain went to [facility] was sent to the ER there she had a CAT scan shows some diverticulosis low Rahat rhinitis  No abscess since that time her left lower quadrant pain is better but was this diagnosed with a UTI and started on Macrobid       she still having some irritation or: No fevers no chills at this time        Allergies   Allergen Reactions    Codeine Rash    Percocet [Oxycodone-Acetaminophen] Rash     Outpatient Encounter Prescriptions as of 10/10/2018   Medication Sig Dispense Refill    ciprofloxacin (CIPRO) 500 MG tablet Take 1 tablet by mouth 2 times daily for 10 days 20 tablet 0    metroNIDAZOLE (FLAGYL) 250 MG tablet Take 1 tablet by mouth 3 times daily for 10 days 30 tablet 0    omeprazole (PRILOSEC) 40 MG delayed release capsule Take 1 capsule by mouth daily 30 capsule 3    pregabalin (LYRICA) 150 MG capsule TAKE ONE (1) CAPSULE BY MOUTH THREE (3) TIMES DAILY.  270 capsule 1    DULoxetine (CYMBALTA) 60 MG extended release capsule Take 1 capsule by mouth daily 30 capsule 2    estradiol (ESTRACE) 0.5 MG tablet TAKE ONE (1) TABLET BY MOUTH ONCE DAILY 90 tablet 1    ranitidine (ZANTAC) 150 MG tablet TAKE ONE (1) TABLET BY MOUTH TWO (2) TIMES A  tablet 1    levothyroxine (SYNTHROID) 50 MCG tablet TAKE ONE (1) TABLET BY MOUTH ONCE DAILY 90 tablet 1    traMADol (ULTRAM) 50 MG tablet TAKE 1 TO 2 TABLETS BY MOUTH EVERY 4 TO 6 HOURS. 360 tablet 0    celecoxib (CELEBREX) 200 MG capsule TAKE ONE (1) CAPSULE

## 2018-12-06 DIAGNOSIS — E55.9 VITAMIN D DEFICIENCY: Primary | ICD-10-CM

## 2018-12-07 RX ORDER — ERGOCALCIFEROL 1.25 MG/1
CAPSULE ORAL
Qty: 12 CAPSULE | Refills: 3 | Status: SHIPPED | OUTPATIENT
Start: 2018-12-07

## 2018-12-19 DIAGNOSIS — M54.5 CHRONIC LOW BACK PAIN, UNSPECIFIED BACK PAIN LATERALITY, WITH SCIATICA PRESENCE UNSPECIFIED: Primary | ICD-10-CM

## 2018-12-19 DIAGNOSIS — G89.29 CHRONIC LOW BACK PAIN, UNSPECIFIED BACK PAIN LATERALITY, WITH SCIATICA PRESENCE UNSPECIFIED: Primary | ICD-10-CM

## 2018-12-19 RX ORDER — TRAMADOL HYDROCHLORIDE 50 MG/1
50 TABLET ORAL EVERY 6 HOURS PRN
Qty: 120 TABLET | Refills: 0 | OUTPATIENT
Start: 2018-12-19 | End: 2019-01-07 | Stop reason: SDUPTHER

## 2018-12-19 NOTE — TELEPHONE ENCOUNTER
Medication: Tramadol 50 mg    Last Office Visit: 10/10/18    Last filled: 8/9/18    Last Signed Contract: 6/14/17    Last Utox: 6/14/17    Last OARRS:  7/13/15        Preferred pharmacy [facility]

## 2019-01-07 ENCOUNTER — OFFICE VISIT (OUTPATIENT)
Dept: FAMILY MEDICINE CLINIC | Age: 64
End: 2019-01-07
Payer: COMMERCIAL

## 2019-01-07 VITALS
RESPIRATION RATE: 16 BRPM | SYSTOLIC BLOOD PRESSURE: 138 MMHG | HEIGHT: 66 IN | OXYGEN SATURATION: 97 % | HEART RATE: 73 BPM | TEMPERATURE: 96.5 F | BODY MASS INDEX: 39.32 KG/M2 | DIASTOLIC BLOOD PRESSURE: 70 MMHG

## 2019-01-07 DIAGNOSIS — M54.50 LUMBAR PAIN WITH RADIATION DOWN LEFT LEG: Primary | ICD-10-CM

## 2019-01-07 DIAGNOSIS — M79.605 LUMBAR PAIN WITH RADIATION DOWN LEFT LEG: Primary | ICD-10-CM

## 2019-01-07 DIAGNOSIS — I10 ESSENTIAL HYPERTENSION: ICD-10-CM

## 2019-01-07 DIAGNOSIS — M54.5 CHRONIC LOW BACK PAIN, UNSPECIFIED BACK PAIN LATERALITY, WITH SCIATICA PRESENCE UNSPECIFIED: ICD-10-CM

## 2019-01-07 DIAGNOSIS — G89.29 CHRONIC LOW BACK PAIN, UNSPECIFIED BACK PAIN LATERALITY, WITH SCIATICA PRESENCE UNSPECIFIED: ICD-10-CM

## 2019-01-07 DIAGNOSIS — M05.711 RHEUMATOID ARTHRITIS INVOLVING RIGHT SHOULDER WITH POSITIVE RHEUMATOID FACTOR (HCC): ICD-10-CM

## 2019-01-07 PROCEDURE — 99213 OFFICE O/P EST LOW 20 MIN: CPT | Performed by: FAMILY MEDICINE

## 2019-01-07 RX ORDER — TRAMADOL HYDROCHLORIDE 50 MG/1
50 TABLET ORAL EVERY 6 HOURS PRN
Qty: 120 TABLET | Refills: 2 | Status: SHIPPED | OUTPATIENT
Start: 2019-01-07 | End: 2019-02-06

## 2019-01-07 RX ORDER — DULOXETIN HYDROCHLORIDE 60 MG/1
60 CAPSULE, DELAYED RELEASE ORAL DAILY
Qty: 90 CAPSULE | Refills: 1 | Status: SHIPPED | OUTPATIENT
Start: 2019-01-07

## 2019-01-07 RX ORDER — TRIAMTERENE AND HYDROCHLOROTHIAZIDE 37.5; 25 MG/1; MG/1
TABLET ORAL
Refills: 3 | COMMUNITY
Start: 2018-12-12

## 2019-01-07 RX ORDER — METOPROLOL SUCCINATE 25 MG/1
TABLET, EXTENDED RELEASE ORAL
Refills: 3 | COMMUNITY
Start: 2018-12-12

## 2019-01-11 DIAGNOSIS — M47.817 LUMBOSACRAL SPONDYLOSIS WITHOUT MYELOPATHY: ICD-10-CM

## 2019-01-11 DIAGNOSIS — M05.711 RHEUMATOID ARTHRITIS INVOLVING RIGHT SHOULDER WITH POSITIVE RHEUMATOID FACTOR (HCC): ICD-10-CM

## 2019-01-11 DIAGNOSIS — M51.36 DEGENERATION OF LUMBAR INTERVERTEBRAL DISC: ICD-10-CM

## 2019-01-11 RX ORDER — TRAMADOL HYDROCHLORIDE 50 MG/1
100 TABLET ORAL
Qty: 360 TABLET | Refills: 0 | Status: SHIPPED | OUTPATIENT
Start: 2019-01-11 | End: 2019-04-11

## 2019-01-11 RX ORDER — TRAMADOL HYDROCHLORIDE 50 MG/1
50 TABLET ORAL
Qty: 360 TABLET | Refills: 0 | Status: CANCELLED | OUTPATIENT
Start: 2019-01-11 | End: 2019-04-11

## 2019-02-04 RX ORDER — HYDROXYCHLOROQUINE SULFATE 200 MG/1
TABLET, FILM COATED ORAL
Qty: 180 TABLET | Refills: 1 | Status: SHIPPED | OUTPATIENT
Start: 2019-02-04

## 2019-02-16 DIAGNOSIS — M05.711 RHEUMATOID ARTHRITIS INVOLVING RIGHT SHOULDER WITH POSITIVE RHEUMATOID FACTOR (HCC): ICD-10-CM

## 2019-02-16 DIAGNOSIS — M47.817 LUMBOSACRAL SPONDYLOSIS WITHOUT MYELOPATHY: ICD-10-CM

## 2019-02-16 DIAGNOSIS — M51.36 DEGENERATION OF LUMBAR INTERVERTEBRAL DISC: ICD-10-CM

## 2019-02-18 RX ORDER — IBUPROFEN 800 MG/1
TABLET ORAL
Qty: 90 TABLET | Refills: 1 | Status: SHIPPED | OUTPATIENT
Start: 2019-02-18

## 2019-02-18 RX ORDER — CELECOXIB 200 MG/1
CAPSULE ORAL
Qty: 180 CAPSULE | Refills: 1 | Status: SHIPPED | OUTPATIENT
Start: 2019-02-18

## 2019-03-15 RX ORDER — LEVOTHYROXINE SODIUM 0.05 MG/1
TABLET ORAL
Qty: 90 TABLET | Refills: 1 | Status: SHIPPED | OUTPATIENT
Start: 2019-03-15

## 2019-03-15 RX ORDER — ESTRADIOL 0.5 MG/1
TABLET ORAL
Qty: 90 TABLET | Refills: 1 | Status: SHIPPED | OUTPATIENT
Start: 2019-03-15

## 2019-04-11 ENCOUNTER — TELEPHONE (OUTPATIENT)
Dept: FAMILY MEDICINE CLINIC | Age: 64
End: 2019-04-11

## 2019-04-15 ENCOUNTER — TELEPHONE (OUTPATIENT)
Dept: FAMILY MEDICINE CLINIC | Age: 64
End: 2019-04-15

## 2019-04-15 NOTE — TELEPHONE ENCOUNTER
Patient calling for phone number to reach [place] at [facility]. Phone number [phone] given to patient.

## 2019-10-23 ENCOUNTER — TELEPHONE (OUTPATIENT)
Dept: FAMILY MEDICINE CLINIC | Age: 64
End: 2019-10-23

## 2020-05-26 ENCOUNTER — TELEPHONE (OUTPATIENT)
Dept: FAMILY MEDICINE CLINIC | Age: 65
End: 2020-05-26

## 2020-06-05 LAB
C-REACTIVE PROTEIN: 0.94 MG/DL
D DIMER: 578 NG/ML FEU

## 2020-06-06 LAB
SARS-COV-2, IGG: NEGATIVE
SARS-COV-2, PCR: NOT DETECTED
SPECIMEN SOURCE: NORMAL
SPECIMEN SOURCE: NORMAL

## 2021-06-10 ENCOUNTER — TELEPHONE (OUTPATIENT)
Dept: PAIN MANAGEMENT | Age: 66
End: 2021-06-10

## 2021-06-10 NOTE — TELEPHONE ENCOUNTER
PATIENT WOULD LIKE A NEW PAIN MGMT RECOMMENDATION SINCE SHE IS UNABLE TO [facility] DUE TO BEING OUT OF NETWORK WITH [facility]. PLEASE SEND TO DR. Gonzales Jake AND CONTACT PATIENT.

## 2021-06-10 NOTE — TELEPHONE ENCOUNTER
SPOKE WITH PATIENT IN REGARDS TO HER [facility]. SHE IS UNABLE TO % OF COST AND WOULD LIKE A RECOMMENDATION OF NEW PAIN MGMT DOCTOR FROM DR. Polly Christensen.

## 2021-09-13 NOTE — PROGRESS NOTES
Carol Braun(Attending) Subjective:      Patient ID: Lazara Mcmanus is a 65 y.o. female. Chief Complaint   Patient presents with    Sinusitis     discuss sinus infection x 1 wk, getting worse, has sore troat, stuffy head, worse has night. has tried OTC Zyterc with some relief. HPI    . Congestion coughing drainage last week she was getting worse has sore throat stuffy head deathly worse at night tried some Zyrtec with no relief      Eating drinking well otherwise positive pain across front of her head  Allergies   Allergen Reactions    Codeine Rash    Percocet [Oxycodone-Acetaminophen] Rash     Outpatient Encounter Prescriptions as of 3/31/2018   Medication Sig Dispense Refill    Promethazine-Phenylephrine (PHENERGAN-VC) 6.25-5 MG/5ML syrup Take 5 mLs by mouth every 6 hours 118 mL 2    cefdinir (OMNICEF) 300 MG capsule Take 2 capsules by mouth daily for 10 days 20 capsule 0    levothyroxine (SYNTHROID) 50 MCG tablet TAKE ONE (1) TABLET BY MOUTH ONCE DAILY 90 tablet 0    estradiol (ESTRACE) 0.5 MG tablet TAKE ONE (1) TABLET BY MOUTH ONCE DAILY 90 tablet 1    ranitidine (ZANTAC) 150 MG tablet TAKE ONE (1) TABLET BY MOUTH TWO (2) TIMES A  tablet 1    triamterene-hydrochlorothiazide (MAXZIDE) 75-50 MG per tablet TAKE ONE (1) TABLET BY MOUTH ONCE DAILY 90 tablet 1    celecoxib (CELEBREX) 200 MG capsule TAKE ONE (1) CAPSULE BY MOUTH TWO (2) TIMES A  capsule 1    hydroxychloroquine (PLAQUENIL) 200 MG tablet TAKE ONE (1) TABLET BY MOUTH TWO (2) TIMES A  tablet 1    pregabalin (LYRICA) 150 MG capsule TAKE ONE (1) CAPSULE BY MOUTH THREE (3) TIMES DAILY.  270 capsule 1    DULoxetine (CYMBALTA) 30 MG extended release capsule TAKE ONE (1) CAPSULE BY MOUTH ONCE DAILY 90 capsule 0    vitamin D (ERGOCALCIFEROL) 23278 units CAPS capsule TAKE ONE (1) CAPSULE BY MOUTH ONCE A WEEK 12 capsule 3    ibuprofen (ADVIL;MOTRIN) 800 MG tablet TAKE ONE (1) TABLET BY MOUTH EVERY 8 HOURS IF NEEDED 90 tablet 1    traMADol Objective:     /82   Pulse 67   Temp 97.6 °F (36.4 °C) (Temporal)   Resp 18   Ht 5' 6\" (1.676 m)   Wt 246 lb 8 oz (111.8 kg)   BMI 39.79 kg/m²     Physical Exam      PHYSICAL EXAMINATION:  Vital signs as noted. Alert, oriented in no acute distress. HEENT:  TMs are showing fluid bilaterally. Pharynx reveals postnasal drainage noted. Positive pain over sinuses and forehead  Pos shoddy adenopathy noted bilaterallyNECK: supple. HEART:  RRR without gallops. LUNGS:  clear to auscultation, no wheezing or rhonchi. ABDOMEN:  soft and nontender, bowel sounds positive. EXTREMITIES:  no edema. SKIN: without any changes      Assessment:      1. Acute recurrent maxillary sinusitis     2. Bronchitis               Plan:        Orders Placed This Encounter   Medications    Promethazine-Phenylephrine (PHENERGAN-VC) 6.25-5 MG/5ML syrup     Sig: Take 5 mLs by mouth every 6 hours     Dispense:  118 mL     Refill:  2    cefdinir (OMNICEF) 300 MG capsule     Sig: Take 2 capsules by mouth daily for 10 days     Dispense:  20 capsule     Refill:  0     No orders of the defined types were placed in this encounter.           Health Maintenance Due   Topic Date Due    Shingles Vaccine (1 of 2 - 2 Dose Series) 11/20/2006    Cervical cancer screen  11/26/2015    Potassium monitoring  01/13/2018    Creatinine monitoring  01/13/2018             Controlled Substances Monitoring:                                    If anything worsens or changes please call us at once , follow-up in the office as planned,

## 2023-03-15 DIAGNOSIS — M06.9 RHEUMATOID ARTHRITIS, INVOLVING UNSPECIFIED SITE, UNSPECIFIED WHETHER RHEUMATOID FACTOR PRESENT (MULTI): ICD-10-CM

## 2023-03-15 RX ORDER — CELECOXIB 200 MG/1
CAPSULE ORAL
Qty: 180 CAPSULE | Refills: 1 | Status: SHIPPED | OUTPATIENT
Start: 2023-03-15 | End: 2023-04-12 | Stop reason: SDUPTHER

## 2023-03-18 ENCOUNTER — TELEPHONE (OUTPATIENT)
Dept: PRIMARY CARE | Facility: CLINIC | Age: 68
End: 2023-03-18
Payer: MEDICARE

## 2023-03-18 DIAGNOSIS — K21.9 GASTROESOPHAGEAL REFLUX DISEASE, UNSPECIFIED WHETHER ESOPHAGITIS PRESENT: ICD-10-CM

## 2023-03-18 NOTE — TELEPHONE ENCOUNTER
Pt is calling because she wanted to let you know that since she switched to Medicare they have to put in a new request for a consult from you for her diabetic supplies. They used to go through a patient assistance program and with Medicare in order to do that they need this new consult request with you. Wanted to leave this with you for when you return on Wednesday    Thanks    Pt's # [phone]    Aware you are out of the office until Wednesday

## 2023-03-18 NOTE — TELEPHONE ENCOUNTER
Pt is calling to request a rx refill. She is aware this will be addressed on Monday by a different provider     Rx Refill Request Telephone Encounter    Name:  Marianna Buck  : 1955     Medication Name:  omeprazole  Dose (Optional):    40mg  Quantity (Optional):    #90  Directions (Optional):   take 1 capsule by mouth once daily    ALLERGIES:   codeine  Percocet   Oxycodone acetaminophen caps    Specific Pharmacy location:  [facility]    Date of last appointment:  23  Date of next appointment:  none    Best number to reach patient:  [phone]

## 2023-03-20 RX ORDER — OMEPRAZOLE 40 MG/1
40 CAPSULE, DELAYED RELEASE ORAL
Qty: 90 CAPSULE | Refills: 0 | Status: SHIPPED | OUTPATIENT
Start: 2023-03-20 | End: 2023-04-12 | Stop reason: SDUPTHER

## 2023-03-20 RX ORDER — OMEPRAZOLE 40 MG/1
40 CAPSULE, DELAYED RELEASE ORAL
COMMUNITY
Start: 2020-04-27 | End: 2023-03-20 | Stop reason: SDUPTHER

## 2023-03-21 PROBLEM — E55.9 VITAMIN D DEFICIENCY: Status: ACTIVE | Noted: 2023-03-21

## 2023-03-21 PROBLEM — S76.211A STRAIN OF GROIN, RIGHT, INITIAL ENCOUNTER: Status: ACTIVE | Noted: 2023-03-21

## 2023-03-21 PROBLEM — L03.116 CELLULITIS OF LEFT LOWER EXTREMITY: Status: ACTIVE | Noted: 2023-03-21

## 2023-03-21 PROBLEM — M79.606 LEG PAIN: Status: ACTIVE | Noted: 2023-03-21

## 2023-03-21 PROBLEM — L03.90 CELLULITIS: Status: ACTIVE | Noted: 2023-03-21

## 2023-03-21 PROBLEM — R51.9 HEADACHE: Status: ACTIVE | Noted: 2023-03-21

## 2023-03-21 PROBLEM — N39.0 UTI (URINARY TRACT INFECTION): Status: ACTIVE | Noted: 2023-03-21

## 2023-03-21 PROBLEM — H35.372 EPIRETINAL MEMBRANE (ERM) OF LEFT EYE: Status: ACTIVE | Noted: 2023-03-21

## 2023-03-21 PROBLEM — H02.833 DERMATOCHALASIS OF BOTH EYELIDS: Status: ACTIVE | Noted: 2023-03-21

## 2023-03-21 PROBLEM — R61 SWEATING INCREASE: Status: ACTIVE | Noted: 2023-03-21

## 2023-03-21 PROBLEM — I10 HTN (HYPERTENSION): Status: ACTIVE | Noted: 2023-03-21

## 2023-03-21 PROBLEM — K21.9 GERD (GASTROESOPHAGEAL REFLUX DISEASE): Status: ACTIVE | Noted: 2023-03-21

## 2023-03-21 PROBLEM — M25.559 PAIN, HIP: Status: ACTIVE | Noted: 2023-03-21

## 2023-03-21 PROBLEM — M48.062 LUMBAR STENOSIS WITH NEUROGENIC CLAUDICATION: Status: ACTIVE | Noted: 2023-03-21

## 2023-03-21 PROBLEM — M79.7 FIBROMYALGIA: Status: ACTIVE | Noted: 2023-03-21

## 2023-03-21 PROBLEM — M17.0 OSTEOARTHRITIS OF KNEES, BILATERAL: Status: ACTIVE | Noted: 2023-03-21

## 2023-03-21 PROBLEM — E03.9 HYPOTHYROIDISM: Status: ACTIVE | Noted: 2023-03-21

## 2023-03-21 PROBLEM — E66.01 MORBID OBESITY WITH BODY MASS INDEX (BMI) OF 40.0 OR HIGHER (MULTI): Status: ACTIVE | Noted: 2023-03-21

## 2023-03-21 PROBLEM — H02.836 DERMATOCHALASIS OF BOTH EYELIDS: Status: ACTIVE | Noted: 2023-03-21

## 2023-03-21 PROBLEM — S16.1XXA CERVICAL STRAIN: Status: ACTIVE | Noted: 2023-03-21

## 2023-03-21 PROBLEM — R73.9 HYPERGLYCEMIA: Status: ACTIVE | Noted: 2023-03-21

## 2023-03-21 PROBLEM — R35.0 URINARY FREQUENCY: Status: ACTIVE | Noted: 2023-03-21

## 2023-03-21 PROBLEM — H40.243 RESIDUAL STAGE OF ANGLE-CLOSURE GLAUCOMA, BILATERAL: Status: ACTIVE | Noted: 2023-03-21

## 2023-03-21 PROBLEM — H18.519 CORNEA GUTTATA: Status: ACTIVE | Noted: 2023-03-21

## 2023-03-21 PROBLEM — L71.9 ROSACEA: Status: ACTIVE | Noted: 2023-03-21

## 2023-03-21 PROBLEM — R25.3 EYELID TWITCH: Status: ACTIVE | Noted: 2023-03-21

## 2023-03-21 PROBLEM — J06.9 URI, ACUTE: Status: ACTIVE | Noted: 2023-03-21

## 2023-03-21 PROBLEM — E66.01 CLASS 3 SEVERE OBESITY DUE TO EXCESS CALORIES WITH SERIOUS COMORBIDITY AND BODY MASS INDEX (BMI) OF 40.0 TO 44.9 IN ADULT (MULTI): Status: ACTIVE | Noted: 2023-03-21

## 2023-03-21 PROBLEM — I07.1 TRICUSPID VALVE REGURGITATION: Status: ACTIVE | Noted: 2023-03-21

## 2023-03-21 PROBLEM — H18.529 MAP-DOT-FINGERPRINT CORNEAL DYSTROPHY: Status: ACTIVE | Noted: 2023-03-21

## 2023-03-21 PROBLEM — H43.811 PVD (POSTERIOR VITREOUS DETACHMENT), RIGHT EYE: Status: ACTIVE | Noted: 2023-03-21

## 2023-03-21 PROBLEM — J02.9 SORE THROAT: Status: ACTIVE | Noted: 2023-03-21

## 2023-03-21 PROBLEM — M17.11 RIGHT KNEE DJD: Status: ACTIVE | Noted: 2023-03-21

## 2023-03-21 PROBLEM — M54.2 NECK PAIN: Status: ACTIVE | Noted: 2023-03-21

## 2023-03-21 PROBLEM — I47.10 PAROXYSMAL SVT (SUPRAVENTRICULAR TACHYCARDIA) (CMS-HCC): Status: ACTIVE | Noted: 2023-03-21

## 2023-03-21 PROBLEM — H35.371 EPIRETINAL MEMBRANE (ERM) OF RIGHT EYE: Status: ACTIVE | Noted: 2023-03-21

## 2023-03-21 PROBLEM — H43.822 VITREOMACULAR TRACTION SYNDROME OF LEFT EYE: Status: ACTIVE | Noted: 2023-03-21

## 2023-03-21 PROBLEM — S16.1XXD NECK STRAIN, SUBSEQUENT ENCOUNTER: Status: ACTIVE | Noted: 2023-03-21

## 2023-03-21 PROBLEM — M53.86 SCIATICA OF RIGHT SIDE ASSOCIATED WITH DISORDER OF LUMBAR SPINE: Status: ACTIVE | Noted: 2023-03-21

## 2023-03-21 PROBLEM — M47.812 DEGENERATIVE ARTHRITIS OF CERVICAL SPINE: Status: ACTIVE | Noted: 2023-03-21

## 2023-03-21 PROBLEM — E66.813 CLASS 3 SEVERE OBESITY DUE TO EXCESS CALORIES WITH SERIOUS COMORBIDITY AND BODY MASS INDEX (BMI) OF 40.0 TO 44.9 IN ADULT: Status: ACTIVE | Noted: 2023-03-21

## 2023-03-21 PROBLEM — R06.02 SHORTNESS OF BREATH: Status: ACTIVE | Noted: 2023-03-21

## 2023-03-21 PROBLEM — M54.9 CHRONIC BACK PAIN: Status: ACTIVE | Noted: 2023-03-21

## 2023-03-21 PROBLEM — H40.2221 CHRONIC ANGLE-CLOSURE GLAUCOMA OF LEFT EYE, MILD STAGE: Status: ACTIVE | Noted: 2023-03-21

## 2023-03-21 PROBLEM — H25.13 NUCLEAR SCLEROTIC CATARACT OF BOTH EYES: Status: ACTIVE | Noted: 2023-03-21

## 2023-03-21 PROBLEM — M54.50 LUMBAR PAIN: Status: ACTIVE | Noted: 2023-03-21

## 2023-03-21 PROBLEM — M79.669 CALF PAIN: Status: ACTIVE | Noted: 2023-03-21

## 2023-03-21 PROBLEM — M51.37 DEGENERATION OF INTERVERTEBRAL DISC OF LUMBOSACRAL REGION: Status: ACTIVE | Noted: 2023-03-21

## 2023-03-21 PROBLEM — M25.562 BILATERAL KNEE PAIN: Status: ACTIVE | Noted: 2023-03-21

## 2023-03-21 PROBLEM — K43.9 VENTRAL HERNIA WITHOUT OBSTRUCTION OR GANGRENE: Status: ACTIVE | Noted: 2023-03-21

## 2023-03-21 PROBLEM — I34.0 MITRAL VALVE REGURGITATION: Status: ACTIVE | Noted: 2023-03-21

## 2023-03-21 PROBLEM — M17.12 LEFT KNEE DJD: Status: ACTIVE | Noted: 2023-03-21

## 2023-03-21 PROBLEM — M79.18 PAIN IN BUTTOCK: Status: ACTIVE | Noted: 2023-03-21

## 2023-03-21 PROBLEM — Z96.659 KNEE JOINT REPLACEMENT STATUS: Status: ACTIVE | Noted: 2023-03-21

## 2023-03-21 PROBLEM — S46.819D: Status: ACTIVE | Noted: 2023-03-21

## 2023-03-21 PROBLEM — M79.89 SWELLING OF LOWER LEG: Status: ACTIVE | Noted: 2023-03-21

## 2023-03-21 PROBLEM — M51.379 DEGENERATION OF INTERVERTEBRAL DISC OF LUMBOSACRAL REGION: Status: ACTIVE | Noted: 2023-03-21

## 2023-03-21 PROBLEM — G89.29 CHRONIC BACK PAIN: Status: ACTIVE | Noted: 2023-03-21

## 2023-03-21 PROBLEM — M25.561 KNEE PAIN, RIGHT: Status: ACTIVE | Noted: 2023-03-21

## 2023-03-21 PROBLEM — M25.561 BILATERAL KNEE PAIN: Status: ACTIVE | Noted: 2023-03-21

## 2023-03-21 PROBLEM — S86.119D: Status: ACTIVE | Noted: 2023-03-21

## 2023-03-21 PROBLEM — S96.911A RIGHT FOOT STRAIN: Status: ACTIVE | Noted: 2023-03-21

## 2023-03-21 PROBLEM — M79.89 SWELLING OF BOTH HANDS: Status: ACTIVE | Noted: 2023-03-21

## 2023-03-21 PROBLEM — E11.9 DIABETES (MULTI): Status: ACTIVE | Noted: 2023-03-21

## 2023-03-21 PROBLEM — M06.9 RHEUMATOID ARTHRITIS (MULTI): Status: ACTIVE | Noted: 2023-03-21

## 2023-03-21 PROBLEM — S86.119A GASTROCNEMIUS STRAIN: Status: ACTIVE | Noted: 2023-03-21

## 2023-03-21 RX ORDER — ASPIRIN 81 MG/1
81 TABLET ORAL
COMMUNITY

## 2023-03-21 RX ORDER — CALCIUM CARBONATE 300MG(750)
400 TABLET,CHEWABLE ORAL DAILY
COMMUNITY

## 2023-03-21 RX ORDER — BLOOD SUGAR DIAGNOSTIC
1 STRIP MISCELLANEOUS 2 TIMES DAILY
COMMUNITY
Start: 2022-05-06

## 2023-03-21 RX ORDER — LEVOTHYROXINE SODIUM 50 UG/1
50 TABLET ORAL DAILY
COMMUNITY
Start: 2017-01-19 | End: 2023-04-12 | Stop reason: SDUPTHER

## 2023-03-21 RX ORDER — METFORMIN HYDROCHLORIDE 500 MG/1
500 TABLET ORAL
COMMUNITY
Start: 2022-04-28 | End: 2023-04-12 | Stop reason: SDUPTHER

## 2023-03-21 RX ORDER — METRONIDAZOLE 10 MG/G
1 GEL TOPICAL DAILY
COMMUNITY
Start: 2015-12-23

## 2023-03-21 RX ORDER — TRIAMTERENE AND HYDROCHLOROTHIAZIDE 75; 50 MG/1; MG/1
1 TABLET ORAL DAILY
COMMUNITY
Start: 2015-12-23 | End: 2023-04-12 | Stop reason: SDUPTHER

## 2023-03-21 RX ORDER — POTASSIUM CHLORIDE 750 MG/1
20 CAPSULE, EXTENDED RELEASE ORAL DAILY
COMMUNITY
Start: 2021-10-20 | End: 2023-04-12 | Stop reason: SDUPTHER

## 2023-03-21 RX ORDER — ESTRADIOL 0.5 MG/1
0.5 TABLET ORAL DAILY
COMMUNITY
Start: 2015-12-23 | End: 2023-04-12 | Stop reason: SDUPTHER

## 2023-03-21 RX ORDER — DULOXETIN HYDROCHLORIDE 60 MG/1
60 CAPSULE, DELAYED RELEASE ORAL DAILY
COMMUNITY
Start: 2019-07-15 | End: 2023-04-12 | Stop reason: SDUPTHER

## 2023-03-21 RX ORDER — TRAMADOL HYDROCHLORIDE 50 MG/1
100 TABLET ORAL
COMMUNITY
Start: 2015-11-16 | End: 2023-04-12 | Stop reason: SDUPTHER

## 2023-03-21 RX ORDER — METOPROLOL SUCCINATE 25 MG/1
25 TABLET, EXTENDED RELEASE ORAL DAILY
COMMUNITY
Start: 2020-04-27 | End: 2023-04-12 | Stop reason: SDUPTHER

## 2023-03-21 RX ORDER — CYCLOBENZAPRINE HCL 10 MG
5-10 TABLET ORAL NIGHTLY PRN
COMMUNITY
Start: 2021-02-17 | End: 2023-04-12 | Stop reason: SDUPTHER

## 2023-03-21 RX ORDER — ERGOCALCIFEROL 1.25 MG/1
1.25 CAPSULE ORAL
COMMUNITY
Start: 2016-07-22 | End: 2023-04-12 | Stop reason: SDUPTHER

## 2023-03-21 RX ORDER — PREGABALIN 150 MG/1
150 CAPSULE ORAL 3 TIMES DAILY
COMMUNITY
Start: 2015-11-16 | End: 2023-04-12 | Stop reason: SDUPTHER

## 2023-03-21 RX ORDER — HYDROXYCHLOROQUINE SULFATE 200 MG/1
200 TABLET, FILM COATED ORAL 2 TIMES DAILY
COMMUNITY
Start: 2015-12-23 | End: 2023-04-12 | Stop reason: SDUPTHER

## 2023-03-21 RX ORDER — DOCUSATE SODIUM 100 MG/1
100 CAPSULE, LIQUID FILLED ORAL 2 TIMES DAILY
COMMUNITY

## 2023-03-21 RX ORDER — HYDROCODONE BITARTRATE AND ACETAMINOPHEN 5; 325 MG/1; MG/1
1 TABLET ORAL DAILY PRN
COMMUNITY
End: 2023-07-25 | Stop reason: SDUPTHER

## 2023-03-24 ENCOUNTER — APPOINTMENT (OUTPATIENT)
Dept: PRIMARY CARE | Facility: CLINIC | Age: 68
End: 2023-03-24
Payer: MEDICARE

## 2023-03-24 NOTE — TELEPHONE ENCOUNTER
SPOKE WITH DWAYNE - THEY WERE SPEAKING TO THE PHARMACIST THAT WORKS WITH  AND OUR PATIENTS - PLACED ANOTHER REFERRAL FOR THEM TO REACH OUT TO THEM.

## 2023-03-29 ENCOUNTER — TELEPHONE (OUTPATIENT)
Dept: PRIMARY CARE | Facility: CLINIC | Age: 68
End: 2023-03-29
Payer: MEDICARE

## 2023-03-29 DIAGNOSIS — E11.9 DIABETES (MULTI): Primary | ICD-10-CM

## 2023-04-03 ENCOUNTER — DOCUMENTATION (OUTPATIENT)
Dept: PRIMARY CARE | Facility: CLINIC | Age: 68
End: 2023-04-03
Payer: MEDICARE

## 2023-04-04 ENCOUNTER — PATIENT OUTREACH (OUTPATIENT)
Dept: PRIMARY CARE | Facility: CLINIC | Age: 68
End: 2023-04-04
Payer: MEDICARE

## 2023-04-04 NOTE — PROGRESS NOTES
Discharge Facility:[facility]  Discharge Diagnosis:Obesity, Covid pneumonia  Admission Date:3-28-23  Discharge Date: 3-31-23    PCP appt:4-14-23    No data recorded  Unable to reach pt. At this time.

## 2023-04-12 ENCOUNTER — OFFICE VISIT (OUTPATIENT)
Dept: PRIMARY CARE | Facility: CLINIC | Age: 68
End: 2023-04-12
Payer: MEDICARE

## 2023-04-12 ENCOUNTER — TELEPHONE (OUTPATIENT)
Dept: PRIMARY CARE | Facility: CLINIC | Age: 68
End: 2023-04-12

## 2023-04-12 VITALS
RESPIRATION RATE: 18 BRPM | HEIGHT: 66 IN | SYSTOLIC BLOOD PRESSURE: 130 MMHG | DIASTOLIC BLOOD PRESSURE: 80 MMHG | TEMPERATURE: 97.6 F | WEIGHT: 269.25 LBS | HEART RATE: 83 BPM | OXYGEN SATURATION: 95 % | BODY MASS INDEX: 43.27 KG/M2

## 2023-04-12 DIAGNOSIS — R79.89 LOW VITAMIN D LEVEL: ICD-10-CM

## 2023-04-12 DIAGNOSIS — N95.1 MENOPAUSAL STATE: ICD-10-CM

## 2023-04-12 DIAGNOSIS — M79.7 FIBROMYALGIA: ICD-10-CM

## 2023-04-12 DIAGNOSIS — K21.9 GASTROESOPHAGEAL REFLUX DISEASE, UNSPECIFIED WHETHER ESOPHAGITIS PRESENT: ICD-10-CM

## 2023-04-12 DIAGNOSIS — E11.9 CONTROLLED TYPE 2 DIABETES MELLITUS WITHOUT COMPLICATION, WITHOUT LONG-TERM CURRENT USE OF INSULIN (MULTI): Primary | ICD-10-CM

## 2023-04-12 DIAGNOSIS — M54.9 BACK PAIN, UNSPECIFIED BACK LOCATION, UNSPECIFIED BACK PAIN LATERALITY, UNSPECIFIED CHRONICITY: ICD-10-CM

## 2023-04-12 DIAGNOSIS — H92.09 OTALGIA, UNSPECIFIED LATERALITY: ICD-10-CM

## 2023-04-12 DIAGNOSIS — Z86.39 HISTORY OF LOW POTASSIUM: ICD-10-CM

## 2023-04-12 DIAGNOSIS — E66.01 CLASS 3 SEVERE OBESITY DUE TO EXCESS CALORIES WITH SERIOUS COMORBIDITY AND BODY MASS INDEX (BMI) OF 40.0 TO 44.9 IN ADULT (MULTI): ICD-10-CM

## 2023-04-12 DIAGNOSIS — E11.9 TYPE 2 DIABETES MELLITUS WITHOUT COMPLICATION, WITHOUT LONG-TERM CURRENT USE OF INSULIN (MULTI): ICD-10-CM

## 2023-04-12 DIAGNOSIS — M25.562 LEFT KNEE PAIN, UNSPECIFIED CHRONICITY: ICD-10-CM

## 2023-04-12 DIAGNOSIS — Z12.31 ENCOUNTER FOR SCREENING MAMMOGRAM FOR MALIGNANT NEOPLASM OF BREAST: ICD-10-CM

## 2023-04-12 DIAGNOSIS — I10 HYPERTENSION, UNSPECIFIED TYPE: ICD-10-CM

## 2023-04-12 DIAGNOSIS — E03.9 HYPOTHYROIDISM, UNSPECIFIED TYPE: ICD-10-CM

## 2023-04-12 DIAGNOSIS — J18.9 PNEUMONIA DUE TO INFECTIOUS ORGANISM, UNSPECIFIED LATERALITY, UNSPECIFIED PART OF LUNG: Primary | ICD-10-CM

## 2023-04-12 DIAGNOSIS — M06.9 RHEUMATOID ARTHRITIS, INVOLVING UNSPECIFIED SITE, UNSPECIFIED WHETHER RHEUMATOID FACTOR PRESENT (MULTI): ICD-10-CM

## 2023-04-12 PROCEDURE — 3079F DIAST BP 80-89 MM HG: CPT | Performed by: FAMILY MEDICINE

## 2023-04-12 PROCEDURE — 3075F SYST BP GE 130 - 139MM HG: CPT | Performed by: FAMILY MEDICINE

## 2023-04-12 PROCEDURE — 1036F TOBACCO NON-USER: CPT | Performed by: FAMILY MEDICINE

## 2023-04-12 PROCEDURE — 99214 OFFICE O/P EST MOD 30 MIN: CPT | Performed by: FAMILY MEDICINE

## 2023-04-12 PROCEDURE — 1159F MED LIST DOCD IN RCRD: CPT | Performed by: FAMILY MEDICINE

## 2023-04-12 PROCEDURE — 3008F BODY MASS INDEX DOCD: CPT | Performed by: FAMILY MEDICINE

## 2023-04-12 RX ORDER — CYCLOBENZAPRINE HCL 10 MG
5-10 TABLET ORAL NIGHTLY PRN
Qty: 180 TABLET | Refills: 1 | Status: SHIPPED | OUTPATIENT
Start: 2023-04-12 | End: 2024-05-01

## 2023-04-12 RX ORDER — ESTRADIOL 0.5 MG/1
0.5 TABLET ORAL DAILY
Qty: 90 TABLET | Refills: 1 | Status: SHIPPED | OUTPATIENT
Start: 2023-04-12 | End: 2023-11-08

## 2023-04-12 RX ORDER — HYDROXYCHLOROQUINE SULFATE 200 MG/1
200 TABLET, FILM COATED ORAL 2 TIMES DAILY
Qty: 180 TABLET | Refills: 1 | Status: SHIPPED | OUTPATIENT
Start: 2023-04-12 | End: 2023-07-11

## 2023-04-12 RX ORDER — METFORMIN HYDROCHLORIDE 500 MG/1
500 TABLET ORAL
Qty: 180 TABLET | Refills: 1 | Status: SHIPPED | OUTPATIENT
Start: 2023-04-12 | End: 2023-11-08

## 2023-04-12 RX ORDER — PREGABALIN 150 MG/1
150 CAPSULE ORAL 3 TIMES DAILY
Qty: 270 CAPSULE | Refills: 1 | Status: SHIPPED | OUTPATIENT
Start: 2023-04-12 | End: 2023-11-08 | Stop reason: SDUPTHER

## 2023-04-12 RX ORDER — HYDROCODONE BITARTRATE AND ACETAMINOPHEN 5; 325 MG/1; MG/1
1 TABLET ORAL DAILY PRN
Qty: 90 TABLET | Refills: 0 | Status: CANCELLED | OUTPATIENT
Start: 2023-04-12

## 2023-04-12 RX ORDER — DULOXETIN HYDROCHLORIDE 60 MG/1
60 CAPSULE, DELAYED RELEASE ORAL DAILY
Qty: 90 CAPSULE | Refills: 1 | Status: SHIPPED | OUTPATIENT
Start: 2023-04-12 | End: 2023-11-08 | Stop reason: SDUPTHER

## 2023-04-12 RX ORDER — LEVOTHYROXINE SODIUM 50 UG/1
50 TABLET ORAL DAILY
Qty: 90 TABLET | Refills: 1 | Status: SHIPPED | OUTPATIENT
Start: 2023-04-12 | End: 2023-11-08 | Stop reason: SDUPTHER

## 2023-04-12 RX ORDER — OMEPRAZOLE 40 MG/1
40 CAPSULE, DELAYED RELEASE ORAL
Qty: 90 CAPSULE | Refills: 1 | Status: SHIPPED | OUTPATIENT
Start: 2023-04-12 | End: 2023-11-08

## 2023-04-12 RX ORDER — ERGOCALCIFEROL 1.25 MG/1
1.25 CAPSULE ORAL
Qty: 4 CAPSULE | Refills: 1 | Status: SHIPPED | OUTPATIENT
Start: 2023-04-12 | End: 2023-07-11

## 2023-04-12 RX ORDER — METOPROLOL SUCCINATE 25 MG/1
25 TABLET, EXTENDED RELEASE ORAL DAILY
Qty: 90 TABLET | Refills: 1 | Status: SHIPPED | OUTPATIENT
Start: 2023-04-12 | End: 2023-11-01 | Stop reason: SDUPTHER

## 2023-04-12 RX ORDER — DULAGLUTIDE 0.75 MG/.5ML
0.75 INJECTION, SOLUTION SUBCUTANEOUS
Qty: 4 EACH | Refills: 1 | Status: SHIPPED | OUTPATIENT
Start: 2023-04-12 | End: 2023-07-03 | Stop reason: ALTCHOICE

## 2023-04-12 RX ORDER — CELECOXIB 200 MG/1
200 CAPSULE ORAL 2 TIMES DAILY
Qty: 180 CAPSULE | Refills: 1 | Status: SHIPPED | OUTPATIENT
Start: 2023-04-12 | End: 2023-04-12

## 2023-04-12 RX ORDER — TRIAMTERENE AND HYDROCHLOROTHIAZIDE 75; 50 MG/1; MG/1
1 TABLET ORAL DAILY
Qty: 90 TABLET | Refills: 1 | Status: SHIPPED | OUTPATIENT
Start: 2023-04-12 | End: 2023-08-02 | Stop reason: DRUGHIGH

## 2023-04-12 RX ORDER — POTASSIUM CHLORIDE 750 MG/1
20 CAPSULE, EXTENDED RELEASE ORAL DAILY
Qty: 90 CAPSULE | Refills: 1 | Status: SHIPPED | OUTPATIENT
Start: 2023-04-12 | End: 2023-11-01 | Stop reason: SDUPTHER

## 2023-04-12 RX ORDER — TIRZEPATIDE 2.5 MG/.5ML
2.5 INJECTION, SOLUTION SUBCUTANEOUS
Qty: 2 ML | Refills: 0 | Status: SHIPPED | OUTPATIENT
Start: 2023-04-12 | End: 2023-04-24

## 2023-04-12 RX ORDER — NALOXONE HYDROCHLORIDE 4 MG/.1ML
4 SPRAY NASAL AS NEEDED
Qty: 2 EACH | Refills: 0 | Status: SHIPPED | OUTPATIENT
Start: 2023-04-12 | End: 2024-04-11

## 2023-04-12 RX ORDER — TRAMADOL HYDROCHLORIDE 50 MG/1
50 TABLET ORAL EVERY 6 HOURS PRN
Qty: 120 TABLET | Refills: 0 | Status: SHIPPED | OUTPATIENT
Start: 2023-04-12 | End: 2023-06-14 | Stop reason: SDUPTHER

## 2023-04-12 ASSESSMENT — ENCOUNTER SYMPTOMS
DEPRESSION: 0
OCCASIONAL FEELINGS OF UNSTEADINESS: 1
LOSS OF SENSATION IN FEET: 0

## 2023-04-12 ASSESSMENT — PATIENT HEALTH QUESTIONNAIRE - PHQ9
2. FEELING DOWN, DEPRESSED OR HOPELESS: NOT AT ALL
1. LITTLE INTEREST OR PLEASURE IN DOING THINGS: NOT AT ALL
SUM OF ALL RESPONSES TO PHQ9 QUESTIONS 1 AND 2: 0

## 2023-04-12 NOTE — TELEPHONE ENCOUNTER
[facility] CALLING, THEY RECEIVED A SCRIPT FOR MOUNJARO, IT IS NOT COVERED BY THE INSURANCE. INSURANCE PREFERRED MEDICATION IS TRULICITY. DO YOU WANT TO CHANGE IT TO THE TRULICITY. PLEASE ADVISE.

## 2023-04-12 NOTE — PROGRESS NOTES
Subjective   Patient ID: Marianna Buck is a 67 y.o. female who presents for Hospital Follow-up, Hypertension, Hypothyroidism, Back Pain, Knee Pain, and Earache.    [facility]    Hospital follow up  NIYA complete  Admitted to [facility]   Admission dates 03/29/2023 - 03/31/2023  Admitted for pneumonia and covid positive   Days since discharge 1.5 weeks   Patient had BW, Chest Xray     Patient also had an ear infection when in the hospital   This is patients right ear   Admits to having a hard time hearing out of her right ear   Levaquin while in the hospital and was sent home with an ear drop.  Patient was also sent home with tessalon perles   She would like a referral for ENT.    Patient advised to follow up with PCP    Review of systems  ; Patient seen today for exam denies any problems with headaches or vision, denies any shortness of breath chest pain nausea or vomiting, no black stool no blood in the stool no heartburn type symptoms denies any problems with constipation or diarrhea, and no dysuria-type symptoms    The patient's allergies medications were reviewed with them today    The patient's social family and surgical history or also reviewed here today, along with her past medical history.     Objective     Alert and active in  no acute distress  HEENT TMs clear oropharynx negative nares clear no drainage noted neck supple  With no adenopathy   Heart regular rate and rhythm without murmur and no carotid bruits  Lungs- clear to auscultation bilaterally, no wheeze or rhonchi noted  Thyroid -negative masses or nodularity  Abdomen- soft times four quadrants , bowel sounds positive no masses or organomegaly, negative tenderness guarding or rebound  Neurological exam unremarkable- DTRs in upper and lower extremities within normal limits.   skin -lesion on chest.      /80 (BP Location: Left arm, Patient Position: Sitting, BP Cuff Size: Large adult)   Pulse 83   Temp 36.4 °C (97.6 °F) (Temporal)   Resp 18   Ht 1.676 m  "(5' 6\")   Wt 122 kg (269 lb 4 oz)   SpO2 95%   BMI 43.46 kg/m²     Allergies   Allergen Reactions    Oxycodone-Acetaminophen Other    Sulfa (Sulfonamide Antibiotics) Other     Adverse reaction    Codeine Rash       Assessment/Plan   Problem List Items Addressed This Visit          Circulatory    HTN (hypertension)    Relevant Medications    triamterene-hydrochlorothiazid (Maxzide) 75-50 mg tablet    metoprolol succinate XL (Toprol-XL) 25 mg 24 hr tablet    Other Relevant Orders    Comprehensive Metabolic Panel    Lipid Panel       Digestive    GERD (gastroesophageal reflux disease)    Relevant Medications    omeprazole (PriLOSEC) 40 mg DR capsule       Musculoskeletal    Fibromyalgia    Relevant Medications    pregabalin (Lyrica) 150 mg capsule    DULoxetine (Cymbalta) 60 mg DR capsule       Endocrine/Metabolic    Class 3 severe obesity due to excess calories with serious comorbidity and body mass index (BMI) of 40.0 to 44.9 in adult (CMS/[facility])    Diabetes (CMS/[facility])    Relevant Medications    metFORMIN (Glucophage) 500 mg tablet    tirzepatide (Mounjaro) 2.5 mg/0.5 mL pen injector    Other Relevant Orders    Hemoglobin A1C    Hypothyroidism    Relevant Medications    levothyroxine (Synthroid, Levoxyl) 50 mcg tablet    Other Relevant Orders    Thyroxine, Free    TSH with reflex to Free T4 if abnormal       Other    Rheumatoid arthritis (CMS/[facility])    Relevant Medications    hydroxychloroquine (Plaquenil) 200 mg tablet    celecoxib (CeleBREX) 200 mg capsule    naloxone (Narcan) 4 mg/0.1 mL nasal spray     Other Visit Diagnoses       Pneumonia due to infectious organism, unspecified laterality, unspecified part of lung    -  Primary    History of low potassium        Relevant Medications    potassium chloride ER (Micro-K) 10 mEq ER capsule    Low vitamin D level        Relevant Medications    ergocalciferol (Vitamin D-2) 1.25 MG (99329 UT) capsule    Left knee pain, unspecified chronicity        Relevant Medications    " cyclobenzaprine (Flexeril) 10 mg tablet    traMADol (Ultram) 50 mg tablet    Back pain, unspecified back location, unspecified back pain laterality, unspecified chronicity        Relevant Medications    traMADol (Ultram) 50 mg tablet    Encounter for screening mammogram for malignant neoplasm of breast        Relevant Orders    BI mammo bilateral screening tomosynthesis    Menopausal state        Relevant Medications    estradiol (Estrace) 0.5 mg tablet    BMI 40.0-44.9, adult (CMS/HCC)        Otalgia, unspecified laterality        Relevant Orders    Referral to ENT          Discussed patient's BMI and to institute calorie reduction and increase exercise to decrease risk of diabetes and heart disease in the future.    I personally reviewed the OARRS report for this patient. I have considered the risks of abuse, dependence, addiction, and diversion.  CSA 7/25/22  UDS 7/25/22    Refill Celebrex, Flexeril, Cymbalta, Vitamin D, Estradiol, Synthroid, Metformin, Metoprolol, Omeprazole, Plaquenil, Potassium, Lyrica, Tramadol, Triamterene- hydrochlorothiazide.    Recommend Zyrtec or Flonase.  She needs to increase her fluid intake.    Discussed medications for diabetes management.  If she loses weight then her back may feel better.  Mounjaro 2.5mg prescribed today.  In 4 weeks we will increase to next dose if not having any complications.    Follow up in 3 months or sooner if necessary.    If anything worsens or changes please call us at once, follow up in the office as planned.    Scribe Attestation  By signing my name below, I, Jennifer Bland MA, Scribe   attest that this documentation has been prepared under the direction and in the presence of Tom Bingham DO.

## 2023-04-18 ENCOUNTER — PATIENT OUTREACH (OUTPATIENT)
Dept: PRIMARY CARE | Facility: CLINIC | Age: 68
End: 2023-04-18
Payer: MEDICARE

## 2023-04-18 NOTE — PROGRESS NOTES
Call regarding apt with PCP on 4-14-23  after hospitalization for Obesity, Covid Pneumonia.  At time of outreach call the patient feels as if their condition has improved since last visit.  Patient verbalizes understanding of new orders including labs, therapy, HHC, and DME.   Patient verbalizes understanding of medications including changes made during PCP  Patient verbalizes understanding of referrals needed to specialist.  Patient verbalized understanding plan of care   Any further questions or concerns at this time for PCP? No  Does patient appear to have CCM needs and will need referral to nurse after call back? No     Tomy Weir LPN

## 2023-04-19 ENCOUNTER — OFFICE VISIT (OUTPATIENT)
Dept: PAIN MANAGEMENT | Age: 68
End: 2023-04-19

## 2023-04-19 VITALS
WEIGHT: 260 LBS | TEMPERATURE: 97.3 F | SYSTOLIC BLOOD PRESSURE: 134 MMHG | BODY MASS INDEX: 41.78 KG/M2 | DIASTOLIC BLOOD PRESSURE: 66 MMHG | HEIGHT: 66 IN

## 2023-04-19 DIAGNOSIS — M48.061 LUMBAR FORAMINAL STENOSIS: ICD-10-CM

## 2023-04-19 DIAGNOSIS — M47.817 LUMBOSACRAL SPONDYLOSIS WITHOUT MYELOPATHY: Primary | ICD-10-CM

## 2023-04-19 ASSESSMENT — ENCOUNTER SYMPTOMS
RESPIRATORY NEGATIVE: 1
BACK PAIN: 1
CONSTIPATION: 0
DIARRHEA: 0

## 2023-04-19 NOTE — PROGRESS NOTES
Medications on File Prior to Visit   Medication Sig Dispense Refill    estradiol (ESTRACE) 0.5 MG tablet TAKE ONE (1) TABLET BY MOUTH ONCE DAILY 90 tablet 1    levothyroxine (SYNTHROID) 50 MCG tablet TAKE ONE (1) TABLET BY MOUTH ONCE DAILY 90 tablet 1    ibuprofen (ADVIL;MOTRIN) 800 MG tablet TAKE ONE (1) TABLET BY MOUTH EVERY 8 HOURS IF NEEDED 90 tablet 1    celecoxib (CELEBREX) 200 MG capsule TAKE ONE (1) CAPSULE BY MOUTH TWO (2) TIMES A  capsule 1    hydroxychloroquine (PLAQUENIL) 200 MG tablet TAKE ONE (1) TABLET BY MOUTH TWO (2) TIMES A  tablet 1    metoprolol succinate (TOPROL XL) 25 MG extended release tablet TAKE 1 2 TABLET BY MOUTH ONCE DAILY  3    triamterene-hydrochlorothiazide (MAXZIDE-25) 37.5-25 MG per tablet TAKE ONE  1  TABLET BY MOUTH ONCE DAILY  3    DULoxetine (CYMBALTA) 60 MG extended release capsule Take 1 capsule by mouth daily 90 capsule 1    vitamin D (ERGOCALCIFEROL) 79347 units CAPS capsule TAKE ONE (1) CAPSULE BY MOUTH ONCE A WEEK 12 capsule 3    nitrofurantoin, macrocrystal-monohydrate, (MACROBID) 100 MG capsule TAKE 1 CAPSULE BY MOUTH TWICE DAILY FOR 10 DAYS  0    omeprazole (PRILOSEC) 40 MG delayed release capsule Take 1 capsule by mouth daily 30 capsule 3    ranitidine (ZANTAC) 150 MG tablet TAKE ONE (1) TABLET BY MOUTH TWO (2) TIMES A  tablet 1    triamterene-hydrochlorothiazide (MAXZIDE) 75-50 MG per tablet TAKE ONE (1) TABLET BY MOUTH ONCE DAILY 90 tablet 1    HYDROcodone-acetaminophen (NORCO) 5-325 MG per tablet TAKE 1 OR 2 TABLETS BY MOUTH EVERY 4 TO 6 HOURS AS NEEDED  0    metroNIDAZOLE (METROGEL) 1 % gel Apply topically daily 60 g 2    pregabalin (LYRICA) 150 MG capsule TAKE ONE (1) CAPSULE BY MOUTH THREE (3) TIMES DAILY. 270 capsule 1     No current facility-administered medications on file prior to visit. 79-year-old female following up today for chronic pain. She was last seen 4/26/2021. Left total knee Nov 3, 2022 with Dr Sabrina Roa.    She is

## 2023-04-21 ENCOUNTER — TELEMEDICINE (OUTPATIENT)
Dept: PHARMACY | Facility: HOSPITAL | Age: 68
End: 2023-04-21
Payer: MEDICARE

## 2023-04-24 ENCOUNTER — TELEMEDICINE (OUTPATIENT)
Dept: PHARMACY | Facility: HOSPITAL | Age: 68
End: 2023-04-24
Payer: MEDICARE

## 2023-04-24 DIAGNOSIS — E11.9 TYPE 2 DIABETES MELLITUS WITHOUT COMPLICATION, WITHOUT LONG-TERM CURRENT USE OF INSULIN (MULTI): Primary | ICD-10-CM

## 2023-04-24 DIAGNOSIS — E11.9 DIABETES (MULTI): ICD-10-CM

## 2023-04-24 RX ORDER — BLOOD-GLUCOSE SENSOR
EACH MISCELLANEOUS
Qty: 2 EACH | Refills: 11 | Status: SHIPPED | OUTPATIENT
Start: 2023-04-24 | End: 2023-09-25 | Stop reason: SDUPTHER

## 2023-04-24 RX ORDER — DULAGLUTIDE 1.5 MG/.5ML
1.5 INJECTION, SOLUTION SUBCUTANEOUS
Qty: 4 EACH | Refills: 2 | Status: SHIPPED | OUTPATIENT
Start: 2023-04-24 | End: 2023-07-03 | Stop reason: ALTCHOICE

## 2023-04-24 ASSESSMENT — ENCOUNTER SYMPTOMS: DIABETIC ASSOCIATED SYMPTOMS: 0

## 2023-04-24 NOTE — PROGRESS NOTES
I reviewed the progress note and agree with the resident’s findings and plans as written. Case discussed with resident.    Lorene Giron, RaulD

## 2023-04-24 NOTE — PROGRESS NOTES
Pharmacy Post-Discharge Visit  Marianna Buck is a 67 y.o. female was referred to Clinical Pharmacy Team to complete a post-discharge medication optimization and monitoring visit.  The patient was referred for their Diabetes.    Admission Date: 3/29/23  Discharge Date: 3/31/23    Referring Provider: Tom Bingham DO    Subjective   Allergies   Allergen Reactions    Oxycodone-Acetaminophen Other    Sulfa (Sulfonamide Antibiotics) Other     Adverse reaction    Codeine Rash       [facility] - [place], [place] - [address]  [address]  [facility] [address]  Phone: [phone] Fax: [phone]    [facility] - [place], [place] - [address]  [address]  [address]  [facility] [address]  Phone: [phone] Fax: [phone]      Social History     Social History Narrative    Not on file        Notable Medication changes following discharge:  Start: Levaquin 750mg PO daily, neomycin/polymyxin B/hydrocortisone 0.35%-10,000 units/mL - 1% otic suspension, benzonatate    Diabetes  She has type 2 diabetes mellitus. Her disease course has been improving. There are no diabetic associated symptoms. There are no hypoglycemic complications. Symptoms are improving. There are no diabetic complications. Risk factors for coronary artery disease include diabetes mellitus, hypertension and dyslipidemia. Current diabetic treatment includes oral agent (monotherapy) (and Trulicity). She is compliant with treatment all of the time. She is following a generally unhealthy diet. She has not had a previous visit with a dietitian. She rarely participates in exercise. Her home blood glucose trend is decreasing steadily. Her breakfast blood glucose is taken between 7-8 am. An ACE inhibitor/angiotensin II receptor blocker is not being taken. She sees a podiatrist.Eye exam is current.     Pt reported does not see endocrinologist. Plan to improve on diet and get involve with water aerobics. Pt  reported interested in continuous glucose meter as she is tired of poking herself to test her blood sugar. On [employer] by PCP with the plan to titrate up to maximum dose. Taken Trulicity for 2 weeks total. Denies intolerances or side effects with Trulicity. Pt wants to explore patient assistance program to help with cost for Trulicity. Recently switched from [employer] insurance to Medicare. BG today reported as 151 mg/dL. Averages around 170-200 mg/dL.     Review of Systems    Objective     There were no vitals taken for this visit.     LAB  Lab Results   Component Value Date    BILITOT 0.8 03/28/2023    CALCIUM 8.3 (L) 03/31/2023    CO2 26 03/31/2023    CL 98 03/31/2023    CREATININE 0.75 03/31/2023    GLUCOSE 263 (H) 03/31/2023    ALKPHOS 73 03/28/2023    K 3.9 03/31/2023    PROT 8.0 03/28/2023     (L) 03/31/2023    AST 24 03/28/2023    ALT 22 03/28/2023    BUN 23 03/31/2023    ANIONGAP 14 03/31/2023    MG 2.00 06/04/2020    ALBUMIN 4.0 03/28/2023    LIPASE 55 09/29/2018    GFRF 88 03/31/2023     Lab Results   Component Value Date    TRIG 189 (H) 04/25/2022    CHOL 153 04/25/2022    HDL 44.0 04/25/2022     Lab Results   Component Value Date    HGBA1C 6.9 (A) 10/19/2022         Current Outpatient Medications on File Prior to Visit   Medication Sig Dispense Refill    ACCU-CHEK SOFTCLIX LANCETS MISC 1 Lancet  in the morning and 1 Lancet before bedtime. Use as directed to test blood sugar twice daily.      aspirin 81 mg EC tablet Take 1 tablet (81 mg) by mouth.      blood sugar diagnostic (Accu-Chek Guide test strips) strip 1 each  in the morning and 1 each in the evening. TEST TWICE DAILY.      celecoxib (CeleBREX) 200 mg capsule Take 1 capsule (200 mg) by mouth in the morning and 1 capsule (200 mg) before bedtime. 180 capsule 1    cyclobenzaprine (Flexeril) 10 mg tablet Take 0.5-1 tablets (5-10 mg) by mouth as needed at bedtime for muscle spasms. TAKE 1/2 OR 1 TABLET BY MOUTH NIGHTLY AS NEEDED 180 tablet 1     docusate sodium (Colace) 100 mg capsule Take 1 capsule (100 mg) by mouth in the morning and 1 capsule (100 mg) before bedtime.      dulaglutide (Trulicity) 0.75 mg/0.5 mL pen injector Inject 0.75 mg under the skin 1 (one) time per week. 4 each 1    DULoxetine (Cymbalta) 60 mg DR capsule Take 1 capsule (60 mg) by mouth once daily. 90 capsule 1    ergocalciferol (Vitamin D-2) 1.25 MG (04676 UT) capsule Take 1 capsule (1,250 mcg) by mouth 1 (one) time per week. 4 capsule 1    estradiol (Estrace) 0.5 mg tablet Take 1 tablet (0.5 mg) by mouth once daily. 90 tablet 1    HYDROcodone-acetaminophen (Norco) 5-325 mg tablet Take 1 tablet by mouth once daily as needed.      hydroxychloroquine (Plaquenil) 200 mg tablet Take 1 tablet (200 mg) by mouth in the morning and 1 tablet (200 mg) before bedtime. 180 tablet 1    levothyroxine (Synthroid, Levoxyl) 50 mcg tablet Take 1 tablet (50 mcg) by mouth once daily. 90 tablet 1    magnesium oxide (Mag-Ox) 400 mg tablet Take 1 tablet (400 mg) by mouth once daily.      metFORMIN (Glucophage) 500 mg tablet Take 1 tablet (500 mg) by mouth in the morning and 1 tablet (500 mg) in the evening. Take with meals. 180 tablet 1    metoprolol succinate XL (Toprol-XL) 25 mg 24 hr tablet Take 1 tablet (25 mg) by mouth once daily. 90 tablet 1    metroNIDAZOLE (Metrogel) 1 % gel Apply 1 Application topically once daily. APPLY SPARINGLY TO AFFECTED AREA(S) ONCE DAILY      naloxone (Narcan) 4 mg/0.1 mL nasal spray Administer 1 spray (4 mg) into affected nostril(s) if needed for opioid reversal. May repeat every 2-3 minutes if needed, alternating nostrils, until medical assistance becomes available. 2 each 0    omeprazole (PriLOSEC) 40 mg DR capsule Take 1 capsule (40 mg) by mouth once daily in the morning. Take before meals. 90 capsule 1    potassium chloride ER (Micro-K) 10 mEq ER capsule Take 2 capsules (20 mEq) by mouth once daily. 90 capsule 1    pregabalin (Lyrica) 150 mg capsule Take 1 capsule (150  mg) by mouth in the morning and 1 capsule (150 mg) in the evening and 1 capsule (150 mg) before bedtime. 270 capsule 1    traMADol (Ultram) 50 mg tablet Take 1 tablet (50 mg) by mouth every 6 hours if needed for severe pain (7 - 10). 120 tablet 0    triamterene-hydrochlorothiazid (Maxzide) 75-50 mg tablet Take 1 tablet by mouth once daily. 90 tablet 1    [DISCONTINUED] tirzepatide (Mounjaro) 2.5 mg/0.5 mL pen injector Inject 2.5 mg under the skin 1 (one) time per week. 2 mL 0     No current facility-administered medications on file prior to visit.        DRUG INTERATIONS  - No drug interaction was found at this visit.    Assessment/Plan   Problem List Items Addressed This Visit          Endocrine/Metabolic    Diabetes (CMS/HCC) - Primary     Continue the following medications:  Metformin 500mg BID  Trulicity 0.5mg subQ weekly x 2 more doses  Increase Trulicity to 1.5mg subQ weekly after finishing x2 more doses of 0.5mg dose.   Prescription were sent to patient's preferred  Pharmacy for the following meds:  Trulicity 1.5mg subQ weekly x 4 dose then increase to 3mg as tolerated  FreeStyle Yefri 3 CGM  Pt to incorporate a DASH diet and work on being more active  Pt to bring her tax documents and income statements to apply for  PAP to help with: Trulicity  Pt to continue to take her daily BG and keep a log for next visit  FUV 5/22 @ 11:30AM             Cipriano Walton PharmD     Verbal consent to manage patient's drug therapy was obtained from [the patient and/or an individual authorized to act on behalf of a patient]. They were informed they may decline to participate or withdraw from participation in pharmacy services at any time.   HPI: LLQ pain  CT Sigmoid Diverticulitis        PAST MEDICAL & SURGICAL HISTORY:  Carpal Tunnel Syndrome: left  Uterine Endometriosis  Manic Depression  Iron Deficiency Anemia  Seizure 1 episode  1996  S/P Abdominal Supracervical Subtotal Hysterectomy  dental surgery - wisdom teeth extraction: 1980&#x27;s      MEDICATIONS  (STANDING):  ciprofloxacin   IVPB 400 milliGRAM(s) IV Intermittent every 12 hours  citalopram 40 milliGRAM(s) Oral daily  docusate sodium 100 milliGRAM(s) Oral two times a day  enoxaparin Injectable 40 milliGRAM(s) SubCutaneous every 24 hours  lactated ringers. 1000 milliLiter(s) (110 mL/Hr) IV Continuous <Continuous>  metroNIDAZOLE  IVPB 500 milliGRAM(s) IV Intermittent every 8 hours  nicotine -  14 mG/24Hr(s) Patch 1 patch Transdermal daily  pantoprazole  Injectable 40 milliGRAM(s) IV Push daily    MEDICATIONS  (PRN):  HYDROmorphone  Injectable 1 milliGRAM(s) IV Push every 4 hours PRN Severe Pain (7 - 10)  ketorolac   Injectable 30 milliGRAM(s) IV Push every 6 hours PRN Moderate Pain (4 - 6)  ondansetron Injectable 4 milliGRAM(s) IV Push every 8 hours PRN Nausea      Allergies    doxycycline (Diarrhea)  Wellbutrin (Seizure)    Intolerances        SOCIAL HISTORY:    FAMILY HISTORY:   Non-contributory    REVIEW OF SYSTEMS      General:	    Respiratory and Thorax:  	  Cardiovascular:	    Gastrointestinal:	    Musculoskeletal:	   Vital Signs Last 24 Hrs  T(C): --  T(F): --  HR: --  BP: --  BP(mean): --  RR: --  SpO2: --    HEENT :No Pallor.No icterus. EOMI,PERLAA  Chest : Clear to Auscultation  CVS : S1S2 Normal.No murmurs.  Abdomen: Soft.LLQ tender .Normal bowel sounds.No Organomegaly.  CNS: Alert.Oriented to Time,Place and Person.No focal deficit.  EXT: Normal Range of motion.No pitting edema.    LABS:                        14.5   14.67 )-----------( 289      ( 07 Oct 2018 16:00 )             42.2     10-07    139  |  104  |  11  ----------------------------<  95  4.1   |  27  |  0.67    Ca    9.1      07 Oct 2018 16:00    TPro  7.6  /  Alb  4.1  /  TBili  0.6  /  DBili  x   /  AST  36  /  ALT  39  /  AlkPhos  114  10-07    PT/INR - ( 07 Oct 2018 16:00 )   PT: 10.3 sec;   INR: 0.95 ratio         PTT - ( 07 Oct 2018 16:00 )  PTT:27.5 sec  LIVER FUNCTIONS - ( 07 Oct 2018 16:00 )  Alb: 4.1 g/dL / Pro: 7.6 gm/dL / ALK PHOS: 114 U/L / ALT: 39 U/L / AST: 36 U/L / GGT: x             RADIOLOGY & ADDITIONAL STUDIES:

## 2023-04-24 NOTE — ASSESSMENT & PLAN NOTE
Continue the following medications:  Metformin 500mg BID  Trulicity 0.5mg subQ weekly x 2 more doses  Increase Trulicity to 1.5mg subQ weekly after finishing x2 more doses of 0.5mg dose.   Prescription were sent to patient's preferred  Pharmacy for the following meds:  Trulicity 1.5mg subQ weekly x 4 dose then increase to 3mg as tolerated  FreeStyle Yefri 3 CGM  Pt to incorporate a DASH diet and work on being more active  Pt to bring her tax documents and income statements to apply for  PAP to help with: Trulicity  Pt to continue to take her daily BG and keep a log for next visit  FUV 5/22 @ 11:30AM

## 2023-04-27 ENCOUNTER — TELEPHONE (OUTPATIENT)
Dept: PAIN MANAGEMENT | Age: 68
End: 2023-04-27

## 2023-04-27 NOTE — TELEPHONE ENCOUNTER
REFERRAL NUMBER [id number]    BILAT L3,4,5 MBB    AUTH FROM 5/8/23-5/19/23    OK to schedule procedure approved as above. Please note sides/levels approved and date range.    (If applicable, sides/levels approved may differ from those ordered)    TO BE SCHEDULED WITH DR. Marnie Amezcua

## 2023-05-09 ENCOUNTER — PROCEDURE VISIT (OUTPATIENT)
Dept: PAIN MANAGEMENT | Age: 68
End: 2023-05-09
Payer: MEDICARE

## 2023-05-09 DIAGNOSIS — M47.817 LUMBOSACRAL SPONDYLOSIS WITHOUT MYELOPATHY: ICD-10-CM

## 2023-05-09 PROCEDURE — 64494 INJ PARAVERT F JNT L/S 2 LEV: CPT | Performed by: PAIN MEDICINE

## 2023-05-09 PROCEDURE — 64493 INJ PARAVERT F JNT L/S 1 LEV: CPT | Performed by: PAIN MEDICINE

## 2023-05-09 RX ORDER — LIDOCAINE HYDROCHLORIDE 10 MG/ML
10 INJECTION, SOLUTION EPIDURAL; INFILTRATION; INTRACAUDAL; PERINEURAL ONCE
Status: COMPLETED | OUTPATIENT
Start: 2023-05-09 | End: 2023-05-09

## 2023-05-09 RX ORDER — BETAMETHASONE SODIUM PHOSPHATE AND BETAMETHASONE ACETATE 3; 3 MG/ML; MG/ML
6 INJECTION, SUSPENSION INTRA-ARTICULAR; INTRALESIONAL; INTRAMUSCULAR; SOFT TISSUE ONCE
Status: COMPLETED | OUTPATIENT
Start: 2023-05-09 | End: 2023-05-09

## 2023-05-09 RX ADMIN — BETAMETHASONE SODIUM PHOSPHATE AND BETAMETHASONE ACETATE 6 MG: 3; 3 INJECTION, SUSPENSION INTRA-ARTICULAR; INTRALESIONAL; INTRAMUSCULAR; SOFT TISSUE at 11:29

## 2023-05-09 RX ADMIN — LIDOCAINE HYDROCHLORIDE 10 MG: 10 INJECTION, SOLUTION EPIDURAL; INFILTRATION; INTRACAUDAL; PERINEURAL at 11:29

## 2023-05-09 NOTE — PROGRESS NOTES
[facility]) Physicians  Neurosurgery and Pain [facility]  [address], [address] [facility] , [address] [facility] [place]: [phone]  F: [phone]      [address]      Provider: Yanet Mendez DO          Patient Name: Gt Almaguer : 1955        Date: 2023       Gt Almaguer is here today for interventional pain management. Majority of symptoms on left side. Standard ASIPP guidelines were followed and sterile technique used. Area was cleaned with Betadine x3. Informed consent was obtained. Fluoroscopic guidance was used for this procedure. Junction of superior articular process and transverse process was identified. For L5 dorsal primary ramus groove of sacral ala and SAP of S1 was identified. Appropriate length spinal needle was used and advanced to correct anatomic location. Negative aspiration was achieved. At each site approximately 1/2cc of 1% preservative free Lidocaine was injected without difficulty. 6 mg Celestone added. Patient tolerated the procedure well, no obvious complications occurred during the procedure. Patient was appropriately monitored and discharged home in stable condition with their usual motor strength. The patient will keep close track of pain over the next several hours and call our office tomorrow and let us know what percentage of pain relief is experienced. Post op Instructions were given to patient. Relevant and recent imaging reviewed with patient today. [] Bilateral [] T12 [] S1      [] L1 [] S2     [] Right [] L2 [] S3      [x] L3      [x] Left [x] L4         [x] L5 [] S. I.                        May need second MBB    Yanet Mendez DO

## 2023-05-18 ENCOUNTER — PATIENT OUTREACH (OUTPATIENT)
Dept: PRIMARY CARE | Facility: CLINIC | Age: 68
End: 2023-05-18
Payer: MEDICARE

## 2023-05-18 NOTE — PROGRESS NOTES
Unable to reach patient for one month post discharge follow up call.   LVM with call back number for patient to call if needed   If no voicemail available call attempts x 2 were made to contact the patient to assist with any questions or concerns patient may have.   Tomy Weir LPN

## 2023-05-22 ENCOUNTER — TELEMEDICINE (OUTPATIENT)
Dept: PHARMACY | Facility: HOSPITAL | Age: 68
End: 2023-05-22
Payer: MEDICARE

## 2023-05-22 DIAGNOSIS — E11.9 TYPE 2 DIABETES MELLITUS WITHOUT COMPLICATION, WITHOUT LONG-TERM CURRENT USE OF INSULIN (MULTI): Primary | ICD-10-CM

## 2023-05-22 NOTE — PROGRESS NOTES
Pharmacist Clinic: Diabetes Management  Marianna Buck is a 67 y.o. female was referred to Clinical Pharmacy Team for diabetes management.     Referring Provider: Tom Bingham DO     HISTORY OF PRESENT ILLNESS  Spoke with patient today regarding diabetes management. Patient remains adherent to metformin and Trulicity therapy. Since starting Trulicity, patient states she experienced diarrhea during the first week, of which has since subsided, and about 9 pound weight loss.     LAB REVIEW   Glucose (mg/dL)   Date Value   03/31/2023 263 (H)   03/30/2023 267 (H)   03/28/2023 164 (H)     Hemoglobin A1C (%)   Date Value   10/19/2022 6.9 (A)   04/25/2022 7.7 (A)     Bicarbonate (mmol/L)   Date Value   03/31/2023 26   03/30/2023 27   03/28/2023 26     Urea Nitrogen (mg/dL)   Date Value   03/31/2023 23   03/30/2023 19   03/28/2023 23     Creatinine (mg/dL)   Date Value   03/31/2023 0.75   03/30/2023 0.85   03/28/2023 1.02     Lab Results   Component Value Date    HGBA1C 6.9 (A) 10/19/2022    HGBA1C 7.7 (A) 04/25/2022     Lab Results   Component Value Date    CREATININE 0.75 03/31/2023     Lab Results   Component Value Date    CHOL 153 04/25/2022    CHOL 132 06/05/2020     Lab Results   Component Value Date    HDL 44.0 04/25/2022    HDL 29.0 (A) 06/05/2020     No results found for: LDLCALC  Lab Results   Component Value Date    TRIG 189 (H) 04/25/2022    TRIG 275 (H) 06/05/2020     No components found for: CHOLHDL  No results found for: LDLCALC    DIABETES ASSESSMENT    CURRENT PHARMACOTHERAPY  - metformin 500 mg BID with meals  - Trulicity 1.5 mg subcutaneously once weekly    SMBG MEASUREMENTS  Patient is testing blood sugar twice daily. Readings typically 170-200 mg/dL.    RECOMMENDATIONS/PLAN  1. Patients diabetes is well controlled with most recent A1c of 6.9% (goal < 7 %).   - Continue: Continue all meds under the continuation of care with the referring provider and clinical pharmacy team.    2. Education:   -  Patient waiting on  PAP approval for Trulicity. Will plan to titrate as tolerated after application approved.    Clinical Pharmacist follow up: 6/5/2023 @ 12 pm    Thank you,  Lorene Giron, Chata    Verbal consent to manage patient's drug therapy was obtained from patient. They were informed they may decline to participate or withdraw from participation in pharmacy services at any time.

## 2023-05-24 ENCOUNTER — OFFICE VISIT (OUTPATIENT)
Dept: PAIN MANAGEMENT | Age: 68
End: 2023-05-24
Payer: MEDICARE

## 2023-05-24 VITALS
WEIGHT: 252 LBS | DIASTOLIC BLOOD PRESSURE: 84 MMHG | TEMPERATURE: 97.6 F | HEIGHT: 66 IN | BODY MASS INDEX: 40.5 KG/M2 | SYSTOLIC BLOOD PRESSURE: 136 MMHG

## 2023-05-24 DIAGNOSIS — M47.817 LUMBOSACRAL SPONDYLOSIS WITHOUT MYELOPATHY: Primary | ICD-10-CM

## 2023-05-24 DIAGNOSIS — M48.061 LUMBAR FORAMINAL STENOSIS: ICD-10-CM

## 2023-05-24 PROCEDURE — 99214 OFFICE O/P EST MOD 30 MIN: CPT | Performed by: NURSE PRACTITIONER

## 2023-05-24 PROCEDURE — 1123F ACP DISCUSS/DSCN MKR DOCD: CPT | Performed by: NURSE PRACTITIONER

## 2023-05-24 PROCEDURE — 3079F DIAST BP 80-89 MM HG: CPT | Performed by: NURSE PRACTITIONER

## 2023-05-24 PROCEDURE — 3075F SYST BP GE 130 - 139MM HG: CPT | Performed by: NURSE PRACTITIONER

## 2023-05-24 ASSESSMENT — ENCOUNTER SYMPTOMS
RESPIRATORY NEGATIVE: 1
BACK PAIN: 1
DIARRHEA: 0
CONSTIPATION: 0

## 2023-05-24 NOTE — PROGRESS NOTES
Patient: Ansley Saha  YOB: 1955  Date: 23        Subjective:     Ansley Saha is a 78 y.o. female who complains today of:    Chief Complaint   Patient presents with    Back Pain         Allergies:  Codeine and Percocet [oxycodone-acetaminophen]    Past Medical History:   Diagnosis Date    Chronic back pain     Hemorrhoid     HTN (hypertension)     Panic attacks     Rheumatoid arthritis(714.0)     Rosacea      Past Surgical History:   Procedure Laterality Date    CARPAL TUNNEL RELEASE      right    CHOLECYSTECTOMY  05/10/2017    GLAUCOMA SURGERY Bilateral 2017    Dr Rosalba Escudero  2018    Abdominal/ umbilical    MOHS SURGERY  14    DR Zoe Guzmán    TUBAL LIGATION       Family History   Problem Relation Age of Onset    Cancer Mother         cervical    High Blood Pressure Mother     Diabetes Father     High Blood Pressure Father     Diabetes Sister      Social History     Socioeconomic History    Marital status:      Spouse name: Not on file    Number of children: Not on file    Years of education: Not on file    Highest education level: Not on file   Occupational History    Not on file   Tobacco Use    Smoking status: Former     Packs/day: 1.00     Years: 15.00     Pack years: 15.00     Types: Cigarettes     Quit date: 1984     Years since quittin.4    Smokeless tobacco: Never   Substance and Sexual Activity    Alcohol use: Not on file    Drug use: Not on file    Sexual activity: Not on file   Other Topics Concern    Not on file   Social History Narrative    Not on file     Social Determinants of Health     Financial Resource Strain: Not on file   Food Insecurity: Not on file   Transportation Needs: Not on file   Physical Activity: Not on file   Stress: Not on file   Social Connections: Not on file   Intimate Partner Violence: Not on file   Housing Stability: Not on file       Current Outpatient Medications on File Prior to Visit

## 2023-05-25 ENCOUNTER — TELEPHONE (OUTPATIENT)
Dept: PAIN MANAGEMENT | Age: 68
End: 2023-05-25

## 2023-05-30 ENCOUNTER — TELEPHONE (OUTPATIENT)
Dept: NEUROSURGERY | Age: 68
End: 2023-05-30

## 2023-06-05 ENCOUNTER — TELEMEDICINE (OUTPATIENT)
Dept: PHARMACY | Facility: HOSPITAL | Age: 68
End: 2023-06-05
Payer: MEDICARE

## 2023-06-05 DIAGNOSIS — E11.9 TYPE 2 DIABETES MELLITUS WITHOUT COMPLICATION, WITHOUT LONG-TERM CURRENT USE OF INSULIN (MULTI): ICD-10-CM

## 2023-06-05 NOTE — PROGRESS NOTES
Pharmacist Clinic: Diabetes Management  Marianna Buck is a 67 y.o. female was referred to Clinical Pharmacy Team for diabetes management.     Referring Provider: Tom Bingham DO     HISTORY OF PRESENT ILLNESS  Spoke with patient today regarding diabetes management. Patient remains adherent to metformin and Trulicity therapy. Since starting Trulicity, patient states she experienced diarrhea during the first week, of which has since subsided, and about 14 pound weight loss.     LAB REVIEW   Glucose (mg/dL)   Date Value   03/31/2023 263 (H)   03/30/2023 267 (H)   03/28/2023 164 (H)     Hemoglobin A1C (%)   Date Value   10/19/2022 6.9 (A)   04/25/2022 7.7 (A)     Bicarbonate (mmol/L)   Date Value   03/31/2023 26   03/30/2023 27   03/28/2023 26     Urea Nitrogen (mg/dL)   Date Value   03/31/2023 23   03/30/2023 19   03/28/2023 23     Creatinine (mg/dL)   Date Value   03/31/2023 0.75   03/30/2023 0.85   03/28/2023 1.02     Lab Results   Component Value Date    HGBA1C 6.9 (A) 10/19/2022    HGBA1C 7.7 (A) 04/25/2022     Lab Results   Component Value Date    CREATININE 0.75 03/31/2023     Lab Results   Component Value Date    CHOL 153 04/25/2022    CHOL 132 06/05/2020     Lab Results   Component Value Date    HDL 44.0 04/25/2022    HDL 29.0 (A) 06/05/2020     No results found for: LDLCALC  Lab Results   Component Value Date    TRIG 189 (H) 04/25/2022    TRIG 275 (H) 06/05/2020     No components found for: CHOLHDL  No results found for: LDLCALC    DIABETES ASSESSMENT    CURRENT PHARMACOTHERAPY  - metformin 500 mg BID with meals  - Trulicity 1.5 mg subcutaneously once weekly    SMBG MEASUREMENTS  Patient is testing blood sugar twice daily. Readings typically ~150s mg/dL.    RECOMMENDATIONS/PLAN  1. Patients diabetes is well controlled with most recent A1c of 6.9% (goal < 7 %).   - Continue: Continue all meds under the continuation of care with the referring provider and clinical pharmacy team.    2. Education:   - Spoke  with patient regarding Trulicity dose increase - patient is going on vacation later this week and would like to revisit when she returns  - Notified patient of  PAP application denial due to income. Patient demonstrated understanding.  - Patient states she has fallen 5 times since November 2022. Patient is to receive an MRI (including pelvis) to determine cause of falls/pain in back.    Clinical Pharmacist follow up: 7/3/2023 @ 12 pm    Thank you,  Lorene Giron, PharmMARCO    Verbal consent to manage patient's drug therapy was obtained from patient. They were informed they may decline to participate or withdraw from participation in pharmacy services at any time.

## 2023-06-06 NOTE — TELEPHONE ENCOUNTER
IMAGES WERE PUSHED TO [facility] PACS AND PT IS HAVING MRI AND X-RAYS DONE AT [facility]. NO LONGER NEED TO BRING DISCS.

## 2023-06-06 NOTE — PROGRESS NOTES
Patient Name: Cherie Verdugo : 1955        Date: 2023      Type of Appt: Consult    Reason for appt: 2nd opinion - previously seen by Dr. Nicole Wilde whom recommended lumbar fusion, lumbosacral spondylosis    Referred by: ANDRE Rodriguez - CNP    Studies done: TBD 23 L/S MRI @ X-ray @ [facility], 22 L/S MRI @  - PUSHED TO [facility] PACS SYSTEM    Conservative Tx tried: Pain Management injections with Dr. Apolonia Amor, Tramadol, Lyrica    Smoking: No    REVIEW OF SYSTEMS:    Bruising/Bleeding Easily, Hearing loss, Constipation , Sleep Disturbance, Back Pain

## 2023-06-07 ENCOUNTER — HOSPITAL ENCOUNTER (OUTPATIENT)
Dept: MRI IMAGING | Age: 68
Discharge: HOME | End: 2023-06-09
Payer: MEDICARE

## 2023-06-07 ENCOUNTER — HOSPITAL ENCOUNTER (OUTPATIENT)
Dept: PHYSICAL THERAPY | Age: 68
Setting detail: THERAPIES SERIES
Discharge: HOME | End: 2023-06-07
Payer: MEDICARE

## 2023-06-07 ENCOUNTER — HOSPITAL ENCOUNTER (OUTPATIENT)
Dept: GENERAL RADIOLOGY | Age: 68
Discharge: HOME | End: 2023-06-09
Payer: MEDICARE

## 2023-06-07 DIAGNOSIS — M47.817 LUMBOSACRAL SPONDYLOSIS WITHOUT MYELOPATHY: ICD-10-CM

## 2023-06-07 PROCEDURE — 72148 MRI LUMBAR SPINE W/O DYE: CPT

## 2023-06-07 PROCEDURE — 72110 X-RAY EXAM L-2 SPINE 4/>VWS: CPT

## 2023-06-07 PROCEDURE — 97162 PT EVAL MOD COMPLEX 30 MIN: CPT

## 2023-06-07 ASSESSMENT — PAIN DESCRIPTION - LOCATION: LOCATION: BACK;BUTTOCKS;ANKLE

## 2023-06-07 ASSESSMENT — PAIN DESCRIPTION - ORIENTATION: ORIENTATION: LEFT

## 2023-06-07 ASSESSMENT — PAIN SCALES - GENERAL: PAINLEVEL_OUTOF10: 8

## 2023-06-07 NOTE — PROGRESS NOTES
perform recreational activities, and perform household/work related duties without pain or limitations. Skilled PT required to address above deficits to improve overall function and return to prior level of function. Special Test:         Special Tests Lumbar Spine  LORENZO Test: R (-), L (-)  Prone Knee Bend Test: R (-), L (-)  Slump Test: R (-), L (-)  Forward Bend Test: (-)  Other:  (distraction=no change)  Special Tests for Hip  Scour (OA, Labrum): L (-), R (-)     PT Education: Goals;PT Role;Plan of Care;Evaluative findings; Insurance    PLAN: [x] Evaluate and Treat  Frequency/Duration:  Plan Frequency: 2  Plan weeks: 6  Current Treatment Recommendations: Strengthening, ROM, Balance training, Functional mobility training, Transfer training, Gait training, Stair training, Modalities, Manual, Home exercise program, Neuromuscular re-education  Modalities: Heat/Cold, Ultrasound, E-stim - unattended     Precautions: Tunbridge risk                          Patient Status:[x] Continue/ Initiate plan of Care     [] Discharge PT. Recommend pt continue with HEP. [] Additional visits requested, Please re-certify for additional visits:        Signature: Electronically signed by José Antonio Mock PT on 6/7/23 at 9:13 AM EDT      If you have any questions or concerns, please don't hesitate to call. Thank you for your referral.    I have reviewed this plan of care and certify a need for medically necessary rehabilitation services.     Physician Signature:__________________________________________________________  Date:  Please sign and return
Internal Rotation: 3+/5  R Hip External Rotation: 3+/5  R Knee Flexion: 3+/5  R Knee Extension: 4-/5  R Ankle Dorsiflexion: 4-/5       Lumbar Assessment     AROM Lumbar Spine   Lumbar spine general AROM: Flex WNL, Ext 25% pain, SB 25% B        Trunk Strength     Trunk Strength  Lower abdominals: Poor (unable to clear shoulder blade off table)     Muscle Length/Flexibility:   Muscle Length LE  90/90 SLR (Hamstring Tightness): Hamstring flexibility -10°R, -10°L at 90/90 hip/knee position. Special Tests:   Special Tests Lumbar Spine  LORENZO Test: R (-), L (-)  Prone Knee Bend Test: R (-), L (-)  Slump Test: R (-), L (-)  Forward Bend Test: (-)  Other:  (distraction=no change)  Special Tests for Hip  Scour (OA, Labrum): L (-), R (-)    Outcomes Score:  Exam: Mod Oswestry 27/50=46% functional       Treatment:    Exercises:   Exercises  Exercise 1: bike* nonrecip if need for L knee  Exercise 2: hip add*, hip abd*  Exercise 3: clams*  Exercise 4: prone hip ext*, ham curl, heel squeeze. GS*  Exercise 5: midrow/lat pull*  Exercise 6: SLS while holding on*  Treatment Reasoning  Limitations addressed: Mobility, Strength, Pain modulation, Posture  Functional ability(s) targeted: Tolerance to age appropriate activities, Ambulating community distances  Modalities:Modalities:  (estim, HP/CP*)        Manual:  Manual Therapy  Soft Tissue Mobilizaton: *lumbar/glut  Treatment Reasoning  Limitations addressed: Tissue extensibility, Painful spasm  Functional ability(s) targeted: Ambulating community distances, Tolerance to age appropriate activities  *Indicates exercise,modality, or manual techniques to be initiated when appropriate       ASSESSMENT     Impression: Assessment: The pt's impairments currently limit functional abilities by 54% including her abilities to reach and lift, stand and walk, perform recreational activities, and perform household/work related duties without pain or limitations.  Skilled PT required to address

## 2023-06-08 ENCOUNTER — INITIAL CONSULT (OUTPATIENT)
Dept: NEUROSURGERY | Age: 68
End: 2023-06-08
Payer: MEDICARE

## 2023-06-08 VITALS
DIASTOLIC BLOOD PRESSURE: 80 MMHG | BODY MASS INDEX: 40.18 KG/M2 | SYSTOLIC BLOOD PRESSURE: 138 MMHG | WEIGHT: 250 LBS | HEIGHT: 66 IN | TEMPERATURE: 97.1 F

## 2023-06-08 DIAGNOSIS — E66.01 CLASS 3 SEVERE OBESITY WITHOUT SERIOUS COMORBIDITY WITH BODY MASS INDEX (BMI) OF 40.0 TO 44.9 IN ADULT, UNSPECIFIED OBESITY TYPE (HCC): Primary | ICD-10-CM

## 2023-06-08 PROCEDURE — 99204 OFFICE O/P NEW MOD 45 MIN: CPT | Performed by: NEUROLOGICAL SURGERY

## 2023-06-08 PROCEDURE — 3079F DIAST BP 80-89 MM HG: CPT | Performed by: NEUROLOGICAL SURGERY

## 2023-06-08 PROCEDURE — 3075F SYST BP GE 130 - 139MM HG: CPT | Performed by: NEUROLOGICAL SURGERY

## 2023-06-08 ASSESSMENT — ENCOUNTER SYMPTOMS
SHORTNESS OF BREATH: 0
CONSTIPATION: 1
ABDOMINAL PAIN: 0
EYE PAIN: 0
TROUBLE SWALLOWING: 0
BACK PAIN: 1
ABDOMINAL DISTENTION: 0
COUGH: 0

## 2023-06-08 NOTE — PROGRESS NOTES
NEUROSURGERY CONSULT NOTE      Patient Name: Zoila Briggs  Patient : 1955  MRN: [id number]       PCP: Elizabeth Welch DO        History of Present Ilness: 78 y.o. presents in neurosurgical consult by Mamta Vang for LBP and BLE sx. This started 23 years ago, has worsened. This is worst with standing and walking. Over the last year started having LUE numbness to knee. L>RLE pain down back of legs to ankles. Also weakness. She saw Dr. Reed Boas who discussed lumbar fusion. Chief Complaint   Patient presents with    Back Pain          Conservative Treatments:  Physical Therapy: Yes  NSAID's: Yes  Narcotics: Yes  Muscle relaxants: Yes  Epidural injections: Yes      Past Medical History:        Diagnosis Date    Chronic back pain     Hemorrhoid     HTN (hypertension)     Panic attacks     Rheumatoid arthritis(714.0)     Rosacea        Past Surgical History:        Procedure Laterality Date    CARPAL TUNNEL RELEASE      right    CHOLECYSTECTOMY  05/10/2017    GLAUCOMA SURGERY Bilateral 2017    Dr Clementina Li  2018    Abdominal/ umbilical    MOHS SURGERY  15    DR Coral Alvarado    TUBAL LIGATION         Home Medications:   Prior to Admission medications    Medication Sig Start Date End Date Taking?  Authorizing Provider   estradiol (ESTRACE) 0.5 MG tablet TAKE ONE (1) TABLET BY MOUTH ONCE DAILY 3/15/19  Yes Sharif Cheung DO   levothyroxine (SYNTHROID) 50 MCG tablet TAKE ONE (1) TABLET BY MOUTH ONCE DAILY 3/15/19  Yes Sharif Cheung DO   ibuprofen (ADVIL;MOTRIN) 800 MG tablet TAKE ONE (1) TABLET BY MOUTH EVERY 8 HOURS IF NEEDED 19  Yes Sharif Cheung DO   celecoxib (CELEBREX) 200 MG capsule TAKE ONE (1) CAPSULE BY MOUTH TWO (2) TIMES A DAY 19  Yes Sharif Cheung DO   hydroxychloroquine (PLAQUENIL) 200 MG tablet TAKE ONE (1) TABLET BY MOUTH TWO (2) TIMES A DAY 19  Yes Berenice Gaming MD   metoprolol succinate (TOPROL XL) 25 MG

## 2023-06-14 DIAGNOSIS — M54.9 BACK PAIN, UNSPECIFIED BACK LOCATION, UNSPECIFIED BACK PAIN LATERALITY, UNSPECIFIED CHRONICITY: ICD-10-CM

## 2023-06-14 DIAGNOSIS — M25.562 LEFT KNEE PAIN, UNSPECIFIED CHRONICITY: ICD-10-CM

## 2023-06-14 RX ORDER — TRAMADOL HYDROCHLORIDE 50 MG/1
50 TABLET ORAL EVERY 6 HOURS PRN
Qty: 120 TABLET | Refills: 0 | Status: SHIPPED | OUTPATIENT
Start: 2023-06-14 | End: 2023-08-02 | Stop reason: SDUPTHER

## 2023-06-14 NOTE — TELEPHONE ENCOUNTER
PATIENT IS REQUESTING THE FOLLOWING MEDICATIONS:    NAME: TRAMADOL  DOSE: 50 MG  DIRECTIONS: TAKE 1 TABLET BY MOUTH EVERY 6 HOURS IF NEEDED FOR SEVERE PAIN  UDS: 7/25/22  CSA: 7/5/22  LOV: 5/2023  PHARMACY: [facility]  ALLERGIES:ON FILE  BEST CONTACT NUMBER FOR PATIENT: [phone]

## 2023-06-20 ENCOUNTER — PROCEDURE VISIT (OUTPATIENT)
Dept: PAIN MANAGEMENT | Age: 68
End: 2023-06-20

## 2023-06-20 DIAGNOSIS — M47.817 LUMBOSACRAL SPONDYLOSIS WITHOUT MYELOPATHY: ICD-10-CM

## 2023-06-20 NOTE — PROGRESS NOTES
[facility]) Physicians  Neurosurgery and Pain [facility]  [address], [address] [facility] , [address] [facility], [place]: [phone]  F: [phone]      [address]      Provider: Ning Nuñez DO          Patient Name: Cesar Duenas : 1955        Date: 2023       Cesar Duenas is here today for interventional pain management. Standard ASIPP guidelines were followed and sterile technique used. Area was cleaned with Betadine x3. Informed consent was obtained. Fluoroscopic guidance was used for this procedure. Junction of superior articular process and transverse process was identified. For L5 dorsal primary ramus groove of sacral ala and SAP of S1 was identified. Appropriate length spinal needle was used and advanced to correct anatomic location. Negative aspiration was achieved. At each site approximately 1/2cc of 1% preservative free Lidocaine was injected without difficulty. Patient tolerated the procedure well, no obvious complications occurred during the procedure. Patient was appropriately monitored and discharged home in stable condition with their usual motor strength. The patient will keep close track of pain over the next several hours and call our office tomorrow and let us know what percentage of pain relief is experienced. Post op Instructions were given to patient. Relevant and recent imaging reviewed with patient today. [] Bilateral [] T12 [] S1      [] L1 [] S2     [] Right [] L2 [] S3      [x] L3      [x] Left [x] L4         [x] L5 [] S. I. Patient had >80% pain relief following procedure with positive facet joint provocative maneuvers, schedule RF ablation.     Ning Nuñez DO

## 2023-06-21 ENCOUNTER — TELEPHONE (OUTPATIENT)
Dept: PAIN MANAGEMENT | Age: 68
End: 2023-06-21

## 2023-06-21 NOTE — TELEPHONE ENCOUNTER
REFERRAL NUMBER [id number]    [facility] L345 RFA    AUTH FROM 6/27/2023-7/11/2023    OK to schedule procedure approved as above. Please note sides/levels approved and date range.    (If applicable, sides/levels approved may differ from those ordered)    TO BE SCHEDULED W/ DR Jose G Mtz

## 2023-06-27 ENCOUNTER — OFFICE VISIT (OUTPATIENT)
Dept: PAIN MANAGEMENT | Age: 68
End: 2023-06-27
Payer: MEDICARE

## 2023-06-27 ENCOUNTER — CLINICAL DOCUMENTATION (OUTPATIENT)
Dept: PAIN MANAGEMENT | Age: 68
End: 2023-06-27

## 2023-06-27 DIAGNOSIS — M47.817 LUMBOSACRAL SPONDYLOSIS WITHOUT MYELOPATHY: ICD-10-CM

## 2023-06-27 PROCEDURE — 64636 DESTROY L/S FACET JNT ADDL: CPT | Performed by: PAIN MEDICINE

## 2023-06-27 PROCEDURE — 64635 DESTROY LUMB/SAC FACET JNT: CPT | Performed by: PAIN MEDICINE

## 2023-06-27 RX ORDER — LIDOCAINE HYDROCHLORIDE 10 MG/ML
10 INJECTION, SOLUTION EPIDURAL; INFILTRATION; INTRACAUDAL; PERINEURAL ONCE
Status: COMPLETED | OUTPATIENT
Start: 2023-06-27 | End: 2023-06-27

## 2023-06-27 RX ORDER — BETAMETHASONE SODIUM PHOSPHATE AND BETAMETHASONE ACETATE 3; 3 MG/ML; MG/ML
6 INJECTION, SUSPENSION INTRA-ARTICULAR; INTRALESIONAL; INTRAMUSCULAR; SOFT TISSUE ONCE
Status: COMPLETED | OUTPATIENT
Start: 2023-06-27 | End: 2023-06-27

## 2023-06-27 RX ADMIN — LIDOCAINE HYDROCHLORIDE 10 MG: 10 INJECTION, SOLUTION EPIDURAL; INFILTRATION; INTRACAUDAL; PERINEURAL at 09:28

## 2023-06-27 RX ADMIN — BETAMETHASONE SODIUM PHOSPHATE AND BETAMETHASONE ACETATE 6 MG: 3; 3 INJECTION, SUSPENSION INTRA-ARTICULAR; INTRALESIONAL; INTRAMUSCULAR; SOFT TISSUE at 09:29

## 2023-06-29 ENCOUNTER — PATIENT OUTREACH (OUTPATIENT)
Dept: PRIMARY CARE | Facility: CLINIC | Age: 68
End: 2023-06-29
Payer: MEDICARE

## 2023-06-29 NOTE — PROGRESS NOTES
Patient has met target of no readmission for (90) days post hospital discharge and is graduated from Transitional Care Management program at this time.  Toym Weir LPN

## 2023-07-03 ENCOUNTER — TELEMEDICINE (OUTPATIENT)
Dept: PHARMACY | Facility: HOSPITAL | Age: 68
End: 2023-07-03
Payer: MEDICARE

## 2023-07-03 DIAGNOSIS — M47.817 LUMBOSACRAL SPONDYLOSIS WITHOUT MYELOPATHY: ICD-10-CM

## 2023-07-03 DIAGNOSIS — M48.061 LUMBAR FORAMINAL STENOSIS: ICD-10-CM

## 2023-07-03 DIAGNOSIS — E11.9 TYPE 2 DIABETES MELLITUS WITHOUT COMPLICATION, WITHOUT LONG-TERM CURRENT USE OF INSULIN (MULTI): ICD-10-CM

## 2023-07-03 RX ORDER — DULAGLUTIDE 3 MG/.5ML
3 INJECTION, SOLUTION SUBCUTANEOUS
Qty: 2 ML | Refills: 1 | Status: SHIPPED | OUTPATIENT
Start: 2023-07-03 | End: 2023-08-17 | Stop reason: ALTCHOICE

## 2023-07-03 NOTE — PROGRESS NOTES
"Pharmacist Clinic: Diabetes Management  Marianna Buck is a 67 y.o. female was referred to Clinical Pharmacy Team for diabetes management.     Referring Provider: Tom Bingham DO     HISTORY OF PRESENT ILLNESS  Spoke with patient today regarding diabetes management. Patient remains adherent to metformin and Trulicity therapy. Since starting Trulicity, patient states she experienced diarrhea during the first week, of which has since subsided, and about 14 pound weight loss. Patient continues to be motivated to lose weight for an upcoming back surgery.    LAB REVIEW   Glucose (mg/dL)   Date Value   03/31/2023 263 (H)   03/30/2023 267 (H)   03/28/2023 164 (H)     Hemoglobin A1C (%)   Date Value   10/19/2022 6.9 (A)   04/25/2022 7.7 (A)     Bicarbonate (mmol/L)   Date Value   03/31/2023 26   03/30/2023 27   03/28/2023 26     Urea Nitrogen (mg/dL)   Date Value   03/31/2023 23   03/30/2023 19   03/28/2023 23     Creatinine (mg/dL)   Date Value   03/31/2023 0.75   03/30/2023 0.85   03/28/2023 1.02     Lab Results   Component Value Date    HGBA1C 6.9 (A) 10/19/2022    HGBA1C 7.7 (A) 04/25/2022     Lab Results   Component Value Date    CREATININE 0.75 03/31/2023     Lab Results   Component Value Date    CHOL 153 04/25/2022    CHOL 132 06/05/2020     Lab Results   Component Value Date    HDL 44.0 04/25/2022    HDL 29.0 (A) 06/05/2020     No results found for: \"LDLCALC\"  Lab Results   Component Value Date    TRIG 189 (H) 04/25/2022    TRIG 275 (H) 06/05/2020     No components found for: \"CHOLHDL\"  No results found for: \"LDLCALC\"    DIABETES ASSESSMENT    CURRENT PHARMACOTHERAPY  - metformin 500 mg BID with meals  - Trulicity 1.5 mg subcutaneously once weekly    SMBG MEASUREMENTS  Patient using Freestyle Yefri to test blood sugar.    Average:   7 days: 135 -153 mg/dL  - TIR: 181-250 mg/dL: 11%,  mg/dL: 89%  14 days: 136 mg/dL  - TIR: 181-250 mg/dL: 6%,  mg/dL: 94%  30 days: 136 mg/dL    RECOMMENDATIONS/PLAN  1. " Patients diabetes is well controlled with most recent A1c of 6.9% (goal < 7 %).   - Increase: Trulicity 3 mg subcutaneously once weekly. Prescription sent to [facility].  - Continue: Continue all meds under the continuation of care with the referring provider and clinical pharmacy team.    2. Education:   - Spoke with patient regarding Trulicity dose increase - patient is motivated to continue to lose weight. Advised patient blood sugar should not fall < 70 mg/dL with Trulicity dose increase however depending on blood sugar readings at follow-up, can likely decrease metformin dose.    Clinical Pharmacist follow up: 7/31/2023 @ 12 pm    Thank you,  Lorene Giron, PharmD    Verbal consent to manage patient's drug therapy was obtained from patient. They were informed they may decline to participate or withdraw from participation in pharmacy services at any time.

## 2023-07-06 ENCOUNTER — APPOINTMENT (OUTPATIENT)
Dept: PHYSICAL THERAPY | Age: 68
End: 2023-07-06
Payer: MEDICARE

## 2023-07-11 ENCOUNTER — HOSPITAL ENCOUNTER (OUTPATIENT)
Dept: PHYSICAL THERAPY | Age: 68
Setting detail: THERAPIES SERIES
Discharge: HOME | End: 2023-07-11
Payer: MEDICARE

## 2023-07-11 PROCEDURE — 97110 THERAPEUTIC EXERCISES: CPT

## 2023-07-11 PROCEDURE — G0283 ELEC STIM OTHER THAN WOUND: HCPCS

## 2023-07-11 ASSESSMENT — PAIN DESCRIPTION - DESCRIPTORS: DESCRIPTORS: ACHING;SHARP;TINGLING

## 2023-07-11 ASSESSMENT — PAIN SCALES - GENERAL: PAINLEVEL_OUTOF10: 8

## 2023-07-11 ASSESSMENT — PAIN DESCRIPTION - PAIN TYPE: TYPE: CHRONIC PAIN

## 2023-07-11 ASSESSMENT — PAIN DESCRIPTION - LOCATION: LOCATION: BACK;BUTTOCKS

## 2023-07-11 ASSESSMENT — PAIN DESCRIPTION - ORIENTATION: ORIENTATION: LEFT

## 2023-07-11 NOTE — PROGRESS NOTES
In progress     Long Term Goals Completed by 6 weeks Goal Status   LTG 1 Indep HEP for symptom management In progress   LTG 2 Pt demo improved overall function by reporting greater than 70% per functional survey score In progress   LTG 3 Pain-free Lumbar Ext and SB  AROM to >/=50% WNL allowing an increase in ADL tolerance. In progress   LTG 4 Improve B Hip/knee strength 4-/5 to 4/5 to  allow patient to recip stair climb with UE support In progress   LTG 5 Ambulate with least restrictive device safe/Indep community distances with min to no deviations to allow grocery visit without difficulty In progress     Plan:  Frequency/Duration:  Plan  Plan Frequency: 2  Plan weeks: 6  Current Treatment Recommendations: Strengthening, ROM, Balance training, Functional mobility training, Transfer training, Gait training, Stair training, Modalities, Manual, Home exercise program, Neuromuscular re-education  Modalities: Heat/Cold, Ultrasound, E-stim - unattended  Pt to continue current HEP. See objective section for any therapeutic exercise changes, additions or modifications this date.     Therapy Time:      PT Individual Minutes  Time In: 0845  Time Out: 3909  Minutes: 60  Timed Code Treatment Minutes: 39 Minutes  Procedure Minutes:ifc lumbar L LE x 10 mins with cold pack   Timed Activity Minutes Units   Ther Ex 39 3     Electronically signed by Darrin Simpson PTA on 7/11/23 at 1:06 PM EDT

## 2023-07-14 ENCOUNTER — HOSPITAL ENCOUNTER (OUTPATIENT)
Dept: PHYSICAL THERAPY | Age: 68
Setting detail: THERAPIES SERIES
Discharge: HOME | End: 2023-07-14
Payer: MEDICARE

## 2023-07-14 PROCEDURE — 97110 THERAPEUTIC EXERCISES: CPT

## 2023-07-14 PROCEDURE — G0283 ELEC STIM OTHER THAN WOUND: HCPCS

## 2023-07-14 NOTE — PROGRESS NOTES
standing to improve functional activity tolerance. In progress   STG 2 Decrease lumbar pain 50% to assist with improved functional gains. In progress   STG 3 Pt to perform SLS B LE 5 sec without UE assist to improve overall dynamic balance during gait. In progress     Long Term Goals Completed by 6 weeks Goal Status   LTG 1 Indep HEP for symptom management In progress   LTG 2 Pt demo improved overall function by reporting greater than 70% per functional survey score In progress   LTG 3 Pain-free Lumbar Ext and SB  AROM to >/=50% WNL allowing an increase in ADL tolerance. In progress   LTG 4 Improve B Hip/knee strength 4-/5 to 4/5 to  allow patient to recip stair climb with UE support In progress   LTG 5 Ambulate with least restrictive device safe/Indep community distances with min to no deviations to allow grocery visit without difficulty In progress          Plan:  Frequency/Duration:  Plan  Plan Frequency: 2  Plan weeks: 6  Current Treatment Recommendations: Strengthening, ROM, Balance training, Functional mobility training, Transfer training, Gait training, Stair training, Modalities, Manual, Home exercise program, Neuromuscular re-education  Modalities: Heat/Cold, Ultrasound, E-stim - unattended  Pt to continue current HEP. See objective section for any therapeutic exercise changes, additions or modifications this date.     Therapy Time:      PT Individual Minutes  Time In: 3275  Time Out: 8065  Minutes: 30  Timed Code Treatment Minutes: 25 Minutes  Procedure Minutes:8 min estim/CP concurrent with manual  Timed Activity Minutes Units   Ther Ex 20 2   Manual  5 0     Electronically signed by Lorie Dance, PT on 7/14/23 at 9:39 AM EDT

## 2023-07-18 ENCOUNTER — HOSPITAL ENCOUNTER (OUTPATIENT)
Dept: PHYSICAL THERAPY | Age: 68
Setting detail: THERAPIES SERIES
Discharge: HOME | End: 2023-07-18
Payer: MEDICARE

## 2023-07-18 NOTE — PROGRESS NOTES
Therapy                            Cancellation/No-show Note    Date: 2023  Patient: Mehrdad Park (74 y.o. 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]  Spinal stenosis, lumbar region without neurogenic claudication [M48.061]      Visit Information:  Insurance: Payor: Doc Law / Plan: Jewel Abbasi ESSENTIAL/PLUS / Product Type: *No Product type* /   Visits to Date: 3   No Show/Cancelled Appts:       For today's appointment patient:  [x]  Cancelled  []  Rescheduled appointment  []  No-show   []  Called pt to remind of next appointment     Reason given by patient:  []  Patient ill  []  Conflicting appointment  []  No transportation    []  Conflict with work  []  No reason given  [x]  Other:  overslept     [x] Pt has future appointments scheduled, no follow up needed  [] Pt requests to be on hold.     Reason:   If > 2 weeks please discuss with therapist.  [] Therapist to call pt for follow up     Comments:       Signature: Electronically signed by Alycia Booth PTA on 23 at 5:58 PM EDT

## 2023-07-21 ENCOUNTER — HOSPITAL ENCOUNTER (OUTPATIENT)
Dept: PHYSICAL THERAPY | Age: 68
Setting detail: THERAPIES SERIES
Discharge: HOME | End: 2023-07-21
Payer: MEDICARE

## 2023-07-21 PROCEDURE — 97110 THERAPEUTIC EXERCISES: CPT

## 2023-07-21 PROCEDURE — 97140 MANUAL THERAPY 1/> REGIONS: CPT

## 2023-07-21 ASSESSMENT — PAIN SCALES - GENERAL: PAINLEVEL_OUTOF10: 6

## 2023-07-21 ASSESSMENT — PAIN DESCRIPTION - DESCRIPTORS: DESCRIPTORS: ACHING;NUMBNESS;SHARP

## 2023-07-21 ASSESSMENT — PAIN DESCRIPTION - ORIENTATION: ORIENTATION: LEFT

## 2023-07-21 ASSESSMENT — PAIN DESCRIPTION - LOCATION: LOCATION: BACK;BUTTOCKS

## 2023-07-21 NOTE — PROGRESS NOTES
[address] [facility], [address]  CXMGJ:[phone]      Physical TherapyTreatment Note        Date: 2023  Patient: Pérez Archer  : 1955   Confirmed: Yes  MRN: [id number]  Referring Provider: ANDRE Fritz CNP      Medical Diagnosis: Spondylosis without myelopathy or radiculopathy, lumbosacral region [M47.817]  Spinal stenosis, lumbar region without neurogenic claudication [M48.061]      Treatment Diagnosis: Lumbar pain with decrease ROM and strength affecting overall posture    Visit Information:  Insurance: Payor: Mardell Mole / Plan: Radom Pereira ESSENTIAL/PLUS / Product Type: *No Product type* /   PT Visit Information  PT Insurance Information: [facility] Medicare  Total # of Visits to Date: 4  No Show: 1  Canceled Appointment: 1  Progress Note Counter: 3/7 (23-23)    Subjective Information:  Subjective: Pt reports going to [facility] with alot of walking and although sore today was able to walk straighter using a cart. HEP Compliance:  [x] Good [] Fair [] Poor [] Reports not doing due to:    Pain Screening  Patient Currently in Pain: Yes  Pain Assessment: 0-10  Pain Level: 6  Pain Location: Back, Buttocks  Pain Orientation: Left  Pain Descriptors: Aching, Numbness, Sharp    Treatment:  Exercises:  Exercises  Exercise 2: supine hip add x18 3 secs, hip abd blue tband x 15 3 secs  Exercise 3: clams blue tband x 15 3 secs  Exercise 4: prone hip ext x 10, ham curl x 10, heel squeeze 5s x 10. GS 5 s x 10  Exercise 5: midrow/lat pull*  Exercise 6: SLS while holding on to table, demo x 2  Exercise 7: LTR x 10 3-5 secs  Treatment Reasoning  Limitations addressed: Mobility, Strength, Pain modulation, Posture  Functional ability(s) targeted:  Tolerance to age appropriate activities, Ambulating community distances    Manual:   Manual Therapy  Soft Tissue Mobilizaton: lumbar/glut tennis ball and L IT band 8 min  Treatment Reasoning  Limitations addressed: Tissue

## 2023-07-25 ENCOUNTER — HOSPITAL ENCOUNTER (OUTPATIENT)
Dept: PHYSICAL THERAPY | Age: 68
Setting detail: THERAPIES SERIES
Discharge: HOME | End: 2023-07-25
Payer: MEDICARE

## 2023-07-25 DIAGNOSIS — G89.29 CHRONIC BILATERAL LOW BACK PAIN WITHOUT SCIATICA: Primary | ICD-10-CM

## 2023-07-25 DIAGNOSIS — M54.50 CHRONIC BILATERAL LOW BACK PAIN WITHOUT SCIATICA: Primary | ICD-10-CM

## 2023-07-25 PROCEDURE — 97110 THERAPEUTIC EXERCISES: CPT

## 2023-07-25 PROCEDURE — 97140 MANUAL THERAPY 1/> REGIONS: CPT

## 2023-07-25 ASSESSMENT — PAIN DESCRIPTION - LOCATION: LOCATION: BACK;LEG

## 2023-07-25 ASSESSMENT — PAIN DESCRIPTION - ORIENTATION: ORIENTATION: LEFT;LOWER

## 2023-07-25 ASSESSMENT — PAIN DESCRIPTION - DESCRIPTORS: DESCRIPTORS: NUMBNESS;THROBBING

## 2023-07-25 ASSESSMENT — PAIN SCALES - GENERAL: PAINLEVEL_OUTOF10: 4

## 2023-07-25 ASSESSMENT — PAIN DESCRIPTION - PAIN TYPE: TYPE: CHRONIC PAIN

## 2023-07-25 NOTE — PROGRESS NOTES
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]  Spinal stenosis, lumbar region without neurogenic claudication [M48.061]      Treatment Diagnosis: Lumbar pain with decrease ROM and strength affecting overall posture    Visit Information:  Insurance: Payor: Orlando Elaine / Plan: [employer] ESSENTIAL/PLUS / Product Type: *No Product type* /   PT Visit Information  PT Insurance Information: BCBS Medicare  Total # of Visits to Date: 5  No Show: 1  Canceled Appointment: 1  Progress Note Counter:  (23-23)    Subjective Information:  Subjective: Pt reports L LE pain and numbness and increase pain \" really bad lateral side of L thigh\" Pt stated that she can walk approx 10 mins. Will need to sit, rest and then can resume activity. \"Sciatic Pain with walking and standing for extended period of time\"  HEP Compliance:  [x] Good [] Fair [] Poor [] Reports not doing due to:    Pain Screening  Patient Currently in Pain: Yes  Pain Assessment: 0-10  Pain Level: 4  Pain Type: Chronic pain  Pain Location: Back, Leg  Pain Orientation: Left, Lower  Pain Descriptors: Numbness, Throbbing    Treatment:  Exercises:  Exercises  Exercise 2: supine hip add x18 3 secs, hip abd blue tband x 15 3 secs  Exercise 3: clams blue tband x 15 3 secs  Exercise 4: prone hip ext x 10, ham curl x 10, heel squeeze 5s x 10. GS 5 s x 10- HEP  Exercise 5: midrow/lat pull*  Exercise 7: LTR x 10 3-5 secs  Treatment Reasoning  Limitations addressed: Mobility, Strength, Pain modulation, Posture  Functional ability(s) targeted:  Tolerance to age appropriate activities, Ambulating community distances    Manual:   Manual Therapy  Soft Tissue

## 2023-07-25 NOTE — TELEPHONE ENCOUNTER
Patient is calling for a short suppy of:    TRAMADOL 50 MG- 1 TABLET EVERY 6 HOURS PRN FOR PAIN    LAST OV:  4/12/23    NEXT OV: 8/2/23    UDS & CSA = 7/25/22    [facility]

## 2023-07-26 RX ORDER — HYDROCODONE BITARTRATE AND ACETAMINOPHEN 5; 325 MG/1; MG/1
1 TABLET ORAL EVERY 6 HOURS PRN
Qty: 36 TABLET | Refills: 0 | Status: SHIPPED | OUTPATIENT
Start: 2023-07-26 | End: 2023-08-02 | Stop reason: ALTCHOICE

## 2023-07-27 ENCOUNTER — OFFICE VISIT (OUTPATIENT)
Dept: PAIN MANAGEMENT | Age: 68
End: 2023-07-27
Payer: MEDICARE

## 2023-07-27 VITALS
RESPIRATION RATE: 16 BRPM | TEMPERATURE: 97.1 F | OXYGEN SATURATION: 97 % | HEART RATE: 62 BPM | WEIGHT: 250 LBS | HEIGHT: 66 IN | BODY MASS INDEX: 40.18 KG/M2 | SYSTOLIC BLOOD PRESSURE: 128 MMHG | DIASTOLIC BLOOD PRESSURE: 82 MMHG

## 2023-07-27 DIAGNOSIS — M54.16 LUMBAR RADICULOPATHY: Primary | ICD-10-CM

## 2023-07-27 DIAGNOSIS — M48.061 LUMBAR FORAMINAL STENOSIS: ICD-10-CM

## 2023-07-27 DIAGNOSIS — M47.817 LUMBOSACRAL SPONDYLOSIS WITHOUT MYELOPATHY: ICD-10-CM

## 2023-07-27 DIAGNOSIS — E66.01 OBESITY, CLASS III, BMI 40-49.9 (MORBID OBESITY) (HCC): ICD-10-CM

## 2023-07-27 PROCEDURE — 3074F SYST BP LT 130 MM HG: CPT | Performed by: NURSE PRACTITIONER

## 2023-07-27 PROCEDURE — 99214 OFFICE O/P EST MOD 30 MIN: CPT | Performed by: NURSE PRACTITIONER

## 2023-07-27 PROCEDURE — 1123F ACP DISCUSS/DSCN MKR DOCD: CPT | Performed by: NURSE PRACTITIONER

## 2023-07-27 PROCEDURE — 3079F DIAST BP 80-89 MM HG: CPT | Performed by: NURSE PRACTITIONER

## 2023-07-27 RX ORDER — CYCLOBENZAPRINE HCL 10 MG
5-10 TABLET ORAL NIGHTLY PRN
COMMUNITY
Start: 2023-04-12

## 2023-07-27 ASSESSMENT — ENCOUNTER SYMPTOMS
CONSTIPATION: 0
DIARRHEA: 0
BACK PAIN: 1
RESPIRATORY NEGATIVE: 1

## 2023-07-27 NOTE — PROGRESS NOTES
Patient: Melonie Bruce  YOB: 1955  Date: 23        Subjective:     Melonie Bruce is a 78 y.o. female who complains today of:    Chief Complaint   Patient presents with    Follow-up     Pt is here for fup of her Ablation.          Allergies:  Erythromycin, Sulfa antibiotics, Codeine, and Percocet [oxycodone-acetaminophen]    Past Medical History:   Diagnosis Date    Chronic back pain     Hemorrhoid     HTN (hypertension)     Panic attacks     Rheumatoid arthritis(714.0)     Rosacea      Past Surgical History:   Procedure Laterality Date    CARPAL TUNNEL RELEASE      right    CHOLECYSTECTOMY  05/10/2017    GLAUCOMA SURGERY Bilateral 2017    Dr Marck Davidson  2018    Abdominal/ umbilical    MOHS SURGERY  14    DR MURPHY    TUBAL LIGATION       Family History   Problem Relation Age of Onset    Cancer Mother         cervical    High Blood Pressure Mother     Diabetes Father     High Blood Pressure Father     Diabetes Sister      Social History     Socioeconomic History    Marital status:      Spouse name: Not on file    Number of children: Not on file    Years of education: Not on file    Highest education level: Not on file   Occupational History    Not on file   Tobacco Use    Smoking status: Former     Packs/day: 1.00     Years: 15.00     Pack years: 15.00     Types: Cigarettes     Quit date: 1984     Years since quittin.5    Smokeless tobacco: Never   Substance and Sexual Activity    Alcohol use: Not on file    Drug use: Not on file    Sexual activity: Not on file   Other Topics Concern    Not on file   Social History Narrative    Not on file     Social Determinants of Health     Financial Resource Strain: Not on file   Food Insecurity: Not on file   Transportation Needs: Not on file   Physical Activity: Not on file   Stress: Not on file   Social Connections: Not on file   Intimate Partner Violence: Not on file   Housing

## 2023-07-28 ENCOUNTER — HOSPITAL ENCOUNTER (OUTPATIENT)
Dept: PHYSICAL THERAPY | Age: 68
Setting detail: THERAPIES SERIES
Discharge: HOME | End: 2023-07-28
Payer: MEDICARE

## 2023-07-28 PROCEDURE — 97110 THERAPEUTIC EXERCISES: CPT

## 2023-07-28 PROCEDURE — 97140 MANUAL THERAPY 1/> REGIONS: CPT

## 2023-07-28 ASSESSMENT — PAIN DESCRIPTION - LOCATION: LOCATION: BACK;LEG

## 2023-07-28 ASSESSMENT — PAIN DESCRIPTION - DESCRIPTORS: DESCRIPTORS: NUMBNESS;THROBBING

## 2023-07-28 ASSESSMENT — PAIN SCALES - GENERAL: PAINLEVEL_OUTOF10: 3

## 2023-07-28 ASSESSMENT — PAIN DESCRIPTION - ORIENTATION: ORIENTATION: LEFT;LOWER

## 2023-07-28 NOTE — PROGRESS NOTES
0730 Bedside and Verbal shift change report given to Jonathan Ribeiro (oncoming nurse) by LILI Pride (offgoing nurse). Report included the following information SBAR, Kardex, MAR and Recent Results. 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 HEP Compliance:  [x] Good [] Fair [] Poor [] Reports not doing due to:    Pain Screening  Patient Currently in Pain: Yes  Pain Assessment: 0-10  Pain Level: 3 (10/10 L le)  Pain Location: Back, Leg  Pain Orientation: Left, Lower  Pain Descriptors: Numbness, Throbbing    Treatment:  Exercises:  Exercises  Exercise 2: supine hip add x18 3 secs, hip abd blue tband x 15 3 secs  Exercise 4: prone hip ext 2x 10, ham curl 2x 10, heel squeeze 5s x 10. GS 5 s x 10- HEP  Exercise 7: LTR x 10 3-5 secs       Manual:   Manual Therapy  Soft Tissue Mobilizaton: lumbar/glut tennis ball and L IT band 10 min  Treatment Reasoning  Limitations addressed: Tissue extensibility, Painful spasm    *Indicates exercise, modality, or manual techniques to be initiated when appropriate    Objective Measures:         Assessment:    Body Structures, Functions, Activity Limitations Requiring Skilled Therapeutic Intervention: Decreased functional mobility , Decreased ADL status, Decreased ROM, Decreased posture, Increased pain,

## 2023-07-31 ENCOUNTER — TELEPHONE (OUTPATIENT)
Dept: PAIN MANAGEMENT | Age: 68
End: 2023-07-31

## 2023-07-31 NOTE — TELEPHONE ENCOUNTER
REFERRAL NUMBER [id number]    LEFT L3-4 TFESI    AUTH FROM 8/7/23-8/18/23    OK to schedule procedure approved as above. Please note sides/levels approved and date range.    (If applicable, sides/levels approved may differ from those ordered)    TO BE SCHEDULED WITH DR Baylee Turcios      PERMISSION TO BE OFF ASA FOR 7 DAYS PER DR Siena Brock AT [facility]

## 2023-08-01 ENCOUNTER — HOSPITAL ENCOUNTER (OUTPATIENT)
Dept: PHYSICAL THERAPY | Age: 68
Setting detail: THERAPIES SERIES
Discharge: HOME | End: 2023-08-01
Payer: MEDICARE

## 2023-08-01 PROCEDURE — 97140 MANUAL THERAPY 1/> REGIONS: CPT

## 2023-08-01 PROCEDURE — 97110 THERAPEUTIC EXERCISES: CPT

## 2023-08-01 ASSESSMENT — PAIN SCALES - GENERAL: PAINLEVEL_OUTOF10: 2

## 2023-08-01 ASSESSMENT — PAIN DESCRIPTION - ORIENTATION: ORIENTATION: LEFT;LOWER

## 2023-08-01 ASSESSMENT — PAIN DESCRIPTION - LOCATION: LOCATION: BACK;LEG

## 2023-08-01 NOTE — PROGRESS NOTES
LTG 3 Pain-free Lumbar Ext and SB  AROM to >/=50% WNL allowing an increase in ADL tolerance. In progress   LTG 4 Improve B Hip/knee strength 4-/5 to 4/5 to  allow patient to recip stair climb with UE support In progress   LTG 5 Ambulate with least restrictive device safe/Indep community distances with min to no deviations to allow grocery visit without difficulty In progress       Plan:  Frequency/Duration:  Plan  Plan Frequency: 2  Plan weeks: 6  Current Treatment Recommendations: Strengthening, ROM, Balance training, Functional mobility training, Transfer training, Gait training, Stair training, Modalities, Manual, Home exercise program, Neuromuscular re-education  Modalities: Heat/Cold, Ultrasound, E-stim - unattended  Pt to continue current HEP. See objective section for any therapeutic exercise changes, additions or modifications this date.     Therapy Time:  PT Individual Minutes  Time In: 0800  Time Out: 0845  Minutes: 45  Timed Code Treatment Minutes: 45 Minutes  Timed Activity Minutes Units   Ther Ex 37 2   Manual  8 1     Electronically signed by Ronni Turner PTA on 8/1/23 at 9:06 AM EDT

## 2023-08-02 ENCOUNTER — OFFICE VISIT (OUTPATIENT)
Dept: PRIMARY CARE | Facility: CLINIC | Age: 68
End: 2023-08-02
Payer: MEDICARE

## 2023-08-02 VITALS
WEIGHT: 259.4 LBS | HEART RATE: 69 BPM | BODY MASS INDEX: 41.69 KG/M2 | TEMPERATURE: 97.6 F | RESPIRATION RATE: 16 BRPM | OXYGEN SATURATION: 95 % | SYSTOLIC BLOOD PRESSURE: 138 MMHG | DIASTOLIC BLOOD PRESSURE: 82 MMHG | HEIGHT: 66 IN

## 2023-08-02 DIAGNOSIS — E66.01 CLASS 3 SEVERE OBESITY DUE TO EXCESS CALORIES WITHOUT SERIOUS COMORBIDITY WITH BODY MASS INDEX (BMI) OF 40.0 TO 44.9 IN ADULT (MULTI): ICD-10-CM

## 2023-08-02 DIAGNOSIS — M54.9 BACK PAIN, UNSPECIFIED BACK LOCATION, UNSPECIFIED BACK PAIN LATERALITY, UNSPECIFIED CHRONICITY: ICD-10-CM

## 2023-08-02 DIAGNOSIS — Z00.00 MEDICARE ANNUAL WELLNESS VISIT, SUBSEQUENT: Primary | ICD-10-CM

## 2023-08-02 DIAGNOSIS — M54.50 CHRONIC LOW BACK PAIN, UNSPECIFIED BACK PAIN LATERALITY, UNSPECIFIED WHETHER SCIATICA PRESENT: ICD-10-CM

## 2023-08-02 DIAGNOSIS — R73.9 HYPERGLYCEMIA: ICD-10-CM

## 2023-08-02 DIAGNOSIS — G89.29 CHRONIC LOW BACK PAIN, UNSPECIFIED BACK PAIN LATERALITY, UNSPECIFIED WHETHER SCIATICA PRESENT: ICD-10-CM

## 2023-08-02 DIAGNOSIS — E03.9 HYPOTHYROIDISM, UNSPECIFIED TYPE: ICD-10-CM

## 2023-08-02 DIAGNOSIS — E11.9 TYPE 2 DIABETES MELLITUS WITHOUT COMPLICATION, WITHOUT LONG-TERM CURRENT USE OF INSULIN (MULTI): ICD-10-CM

## 2023-08-02 DIAGNOSIS — I10 PRIMARY HYPERTENSION: ICD-10-CM

## 2023-08-02 DIAGNOSIS — M25.562 LEFT KNEE PAIN, UNSPECIFIED CHRONICITY: ICD-10-CM

## 2023-08-02 DIAGNOSIS — N39.0 URINARY TRACT INFECTION WITH HEMATURIA, SITE UNSPECIFIED: ICD-10-CM

## 2023-08-02 DIAGNOSIS — R31.9 URINARY TRACT INFECTION WITH HEMATURIA, SITE UNSPECIFIED: ICD-10-CM

## 2023-08-02 DIAGNOSIS — E55.9 VITAMIN D DEFICIENCY: ICD-10-CM

## 2023-08-02 LAB
POC APPEARANCE, URINE: ABNORMAL
POC BILIRUBIN, URINE: NEGATIVE
POC BLOOD, URINE: ABNORMAL
POC COLOR, URINE: ABNORMAL
POC GLUCOSE, URINE: NEGATIVE MG/DL
POC HEMOGLOBIN A1C: 6.2 % (ref 4.2–6.5)
POC KETONES, URINE: NEGATIVE MG/DL
POC LEUKOCYTES, URINE: ABNORMAL
POC NITRITE,URINE: NEGATIVE
POC PH, URINE: 7.5 PH
POC PROTEIN, URINE: ABNORMAL MG/DL
POC SPECIFIC GRAVITY, URINE: 1.02
POC UROBILINOGEN, URINE: 0.2 EU/DL

## 2023-08-02 PROCEDURE — 3075F SYST BP GE 130 - 139MM HG: CPT | Performed by: FAMILY MEDICINE

## 2023-08-02 PROCEDURE — 3008F BODY MASS INDEX DOCD: CPT | Performed by: FAMILY MEDICINE

## 2023-08-02 PROCEDURE — G0439 PPPS, SUBSEQ VISIT: HCPCS | Performed by: FAMILY MEDICINE

## 2023-08-02 PROCEDURE — 1125F AMNT PAIN NOTED PAIN PRSNT: CPT | Performed by: FAMILY MEDICINE

## 2023-08-02 PROCEDURE — 83036 HEMOGLOBIN GLYCOSYLATED A1C: CPT | Performed by: FAMILY MEDICINE

## 2023-08-02 PROCEDURE — 80361 OPIATES 1 OR MORE: CPT

## 2023-08-02 PROCEDURE — 99213 OFFICE O/P EST LOW 20 MIN: CPT | Performed by: FAMILY MEDICINE

## 2023-08-02 PROCEDURE — 80373 DRUG SCREENING TRAMADOL: CPT

## 2023-08-02 PROCEDURE — 80368 SEDATIVE HYPNOTICS: CPT

## 2023-08-02 PROCEDURE — 1159F MED LIST DOCD IN RCRD: CPT | Performed by: FAMILY MEDICINE

## 2023-08-02 PROCEDURE — 81003 URINALYSIS AUTO W/O SCOPE: CPT | Performed by: FAMILY MEDICINE

## 2023-08-02 PROCEDURE — 80346 BENZODIAZEPINES1-12: CPT

## 2023-08-02 PROCEDURE — 80307 DRUG TEST PRSMV CHEM ANLYZR: CPT

## 2023-08-02 PROCEDURE — 80358 DRUG SCREENING METHADONE: CPT

## 2023-08-02 PROCEDURE — 1170F FXNL STATUS ASSESSED: CPT | Performed by: FAMILY MEDICINE

## 2023-08-02 PROCEDURE — 80354 DRUG SCREENING FENTANYL: CPT

## 2023-08-02 PROCEDURE — 80365 DRUG SCREENING OXYCODONE: CPT

## 2023-08-02 PROCEDURE — 3079F DIAST BP 80-89 MM HG: CPT | Performed by: FAMILY MEDICINE

## 2023-08-02 PROCEDURE — 1036F TOBACCO NON-USER: CPT | Performed by: FAMILY MEDICINE

## 2023-08-02 RX ORDER — TRIAMTERENE AND HYDROCHLOROTHIAZIDE 37.5; 25 MG/1; MG/1
1 CAPSULE ORAL EVERY MORNING
Qty: 90 CAPSULE | Refills: 1 | Status: SHIPPED | OUTPATIENT
Start: 2023-08-02 | End: 2024-05-01 | Stop reason: WASHOUT

## 2023-08-02 RX ORDER — TRIAMTERENE AND HYDROCHLOROTHIAZIDE 37.5; 25 MG/1; MG/1
1 CAPSULE ORAL EVERY MORNING
COMMUNITY
End: 2023-08-02 | Stop reason: SDUPTHER

## 2023-08-02 RX ORDER — TRAMADOL HYDROCHLORIDE 50 MG/1
50 TABLET ORAL EVERY 6 HOURS PRN
Qty: 120 TABLET | Refills: 0 | Status: SHIPPED | OUTPATIENT
Start: 2023-08-02 | End: 2023-09-08 | Stop reason: SDUPTHER

## 2023-08-02 RX ORDER — CIPROFLOXACIN 250 MG/1
250 TABLET, FILM COATED ORAL 2 TIMES DAILY
Qty: 14 TABLET | Refills: 1 | Status: SHIPPED | OUTPATIENT
Start: 2023-08-02 | End: 2023-08-02

## 2023-08-02 ASSESSMENT — ACTIVITIES OF DAILY LIVING (ADL)
MANAGING_FINANCES: INDEPENDENT
TAKING_MEDICATION: INDEPENDENT
DRESSING: INDEPENDENT
DOING_HOUSEWORK: INDEPENDENT
GROCERY_SHOPPING: INDEPENDENT
BATHING: INDEPENDENT

## 2023-08-02 ASSESSMENT — ENCOUNTER SYMPTOMS
DEPRESSION: 0
LOSS OF SENSATION IN FEET: 0
OCCASIONAL FEELINGS OF UNSTEADINESS: 0

## 2023-08-02 ASSESSMENT — PATIENT HEALTH QUESTIONNAIRE - PHQ9
SUM OF ALL RESPONSES TO PHQ9 QUESTIONS 1 AND 2: 0
1. LITTLE INTEREST OR PLEASURE IN DOING THINGS: NOT AT ALL
2. FEELING DOWN, DEPRESSED OR HOPELESS: NOT AT ALL

## 2023-08-02 NOTE — PROGRESS NOTES
Subjective   Patient ID: Marianna Buck is a 67 y.o. female who presents for Medicare Annual Wellness Visit Subsequent and UTI, Back pain, Possible UTI.    HPI    AWV    DM  Does check glucose at home   Today glucose was did not check today  Eats a generally healthy diet  active  Currently taking Trulicity,metformin,  Last A1c on 10/19/22 @6.9   %  Last eye exam 1 year  Does see a Podiatrist      Chronic Back pain follow up  Taking the Tramadol  Working well    90% effective   Denies any side effects   [place] reviewed today   CSA 8/2/23  Last took last night    Patient presents today for urinary frequency  States that this has been going on for x 1 week  States that there is burning with urination  States that there is pain with urination  States that there is lower back pain  States that there is not visible blood     Pt is going to do labs         Review of systems  ; Patient seen today for exam denies any problems with headaches or vision, denies any shortness of breath chest pain nausea or vomiting, no black stool no blood in the stool no heartburn type symptoms denies any problems with constipation or diarrhea, and no dysuria-type symptoms    The patient's allergies medications were reviewed with them today    The patient's social family and surgical history or also reviewed here today, along with her past medical history.     Objective     Alert and active in  no acute distress  HEENT TMs clear oropharynx negative nares clear no drainage noted neck supple  With no adenopathy   Heart regular rate and rhythm without murmur and no carotid bruits  Lungs- clear to auscultation bilaterally, no wheeze or rhonchi noted  Thyroid -negative masses or nodularity  Abdomen- soft times four quadrants , bowel sounds positive no masses or organomegaly, negative tenderness guarding or rebound  Neurological exam unremarkable- DTRs in upper and lower extremities within normal limits.   skin -no lesions noted      /82 (BP  "Location: Left arm, Patient Position: Sitting, BP Cuff Size: Large adult)   Pulse 69   Temp 36.4 °C (97.6 °F) (Temporal)   Resp 16   Ht 1.676 m (5' 6\")   Wt 118 kg (259 lb 6.4 oz)   SpO2 95%   BMI 41.87 kg/m²     Allergies   Allergen Reactions    Oxycodone-Acetaminophen Other    Sulfa (Sulfonamide Antibiotics) Other     Adverse reaction    Codeine Rash       Assessment/Plan   Problem List Items Addressed This Visit       Chronic back pain    Relevant Orders    Opiate/Opioid/Benzo Extended Prescription Compliance    Diabetes (CMS/HCC)    Relevant Orders    POCT glycosylated hemoglobin (Hb A1C) manually resulted (Completed)    HTN (hypertension)    Relevant Medications    triamterene-hydrochlorothiazid (Dyazide) 37.5-25 mg capsule    Hyperglycemia    Hypothyroidism    UTI (urinary tract infection)    Relevant Medications    ciprofloxacin (Cipro) 250 mg tablet    Other Relevant Orders    POCT UA Automated manually resulted (Completed)    Vitamin D deficiency    BMI 40.0-44.9, adult (CMS/[facility])     Other Visit Diagnoses       Medicare annual wellness visit, subsequent    -  Primary    Class 3 severe obesity due to excess calories without serious comorbidity with body mass index (BMI) of 40.0 to 44.9 in adult (CMS/[facility])        Left knee pain, unspecified chronicity        Relevant Medications    traMADol (Ultram) 50 mg tablet    Back pain, unspecified back location, unspecified back pain laterality, unspecified chronicity        Relevant Medications    traMADol (Ultram) 50 mg tablet          Medicare wellness questionnaire reviewed in detail. Advanced Directives reviewed today. Patient advised to provide the office with a copy if has not already done so. No problems with activities of daily living. Falls risks reviewed.    Discussed patient's BMI and to institute calorie reduction and increase exercise to decrease risk of diabetes and heart disease in the future.    I personally reviewed the OARRS report for this " patient. I have considered the risks of abuse, dependence, addiction, and diversion.  CSA 8/2/23  UDS 8/2/23    Refill Tramadol.    Take Metoprolol at night.  Decrease Triamterene-hydrochlorothiazide to 37.5-25 mg daily.  Can take her current dose every other day until she receives new dose in the mail.    A1C performed today, 6.2 %.    UA performed, Trace of blood and large amount of leukocytes present.  Cipro 250 mg prescribed today.    If anything worsens or changes please call us at once, follow up in the office as planned.    Scribe Attestation  By signing my name below, I, Jennifer Bladn MA, Scribe   attest that this documentation has been prepared under the direction and in the presence of Tom Bingham DO.

## 2023-08-04 ENCOUNTER — HOSPITAL ENCOUNTER (OUTPATIENT)
Dept: PHYSICAL THERAPY | Age: 68
Setting detail: THERAPIES SERIES
Discharge: HOME | End: 2023-08-04
Payer: MEDICARE

## 2023-08-04 NOTE — PROGRESS NOTES
Therapy                            Cancellation/No-show Note    Date: 2023  Patient: Javi Astorga (37 y.o. female)  : 1955  MRN:  [id number]  Referring Physician: ANDRE Ramirez - CNP    Medical Diagnosis: Spondylosis without myelopathy or radiculopathy, lumbosacral region [M47.817]  Spinal stenosis, lumbar region without neurogenic claudication [M48.061]      Visit Information:  Insurance: Payor: [employer] / Plan: [employer] ESSENTIAL/PLUS / Product Type: *No Product type* /   Visits to Date: 7   No Show/Cancelled Appts:       For today's appointment patient:  []  Cancelled  []  Rescheduled appointment  [x]  No-show   []  Called pt to remind of next appointment     Reason given by patient:  []  Patient ill  []  Conflicting appointment  []  No transportation    []  Conflict with work  []  No reason given  []  Other:      [] Pt has future appointments scheduled, no follow up needed  [] Pt requests to be on hold. Reason:   If > 2 weeks please discuss with therapist.  [] Therapist to call pt for follow up     Comments: Today was supposed to be patients last day of therapy. Pt would like to be discharged.        Signature: Electronically signed by Quique Vera PTA on 23 at 12:46 PM EDT
per functional survey score Not tested due to unexpected discharge. Not met    Long Term Goal 3: Pain-free Lumbar Ext and SB  AROM to >/=50% WNL allowing an increase in ADL tolerance. Not tested due to unexpected discharge. Not met    Long Term Goal 4: Improve B Hip/knee strength 4-/5 to 4/5 to  allow patient to recip stair climb with UE support Not tested due to unexpected discharge. Not met    Long Term Goal 5: Ambulate with least restrictive device safe/Indep community distances with min to no deviations to allow grocery visit without difficulty Taken 7/21/23:  Amb without AD community distance 5-10 min and increasing difficulty with distance Met       Body Structures, Functions, Activity Limitations Requiring Skilled Therapeutic Intervention: Decreased functional mobility , Decreased ADL status, Decreased ROM, Decreased posture, Increased pain, Decreased strength, Decreased sensation, Decreased balance  Assessment: Pt did not return to PT after 8/4/23 unable to determine functional outcomes d/t unexpected D/C. Pt did not show to last scheduled appointment on 8/4/23 but wishes to be discharged from therapy. Pt will continue to work on [address] [place] [address]. PLAN: [x] Discharge     Precautions: [place] risk                                                Patient Status:[] Continue/ Initiate plan of Care                           [x] Discharge PT. Recommend pt continue with HEP.                             [] Additional visits requested, Please re-certify for additional visits:    Objective info by: Electronically signed by Dave Degroot PTA on 8/4/23 at 12:47 PM EDT                                                    Signature: Electronically signed by Jacklyn Kern PT on 8/4/2023 at 4:22 PM

## 2023-08-08 LAB
6-ACETYLMORPHINE: <25 NG/ML
7-AMINOCLONAZEPAM: <25 NG/ML
ALPHA-HYDROXYALPRAZOLAM: <25 NG/ML
ALPHA-HYDROXYMIDAZOLAM: <25 NG/ML
ALPRAZOLAM: <25 NG/ML
AMPHETAMINE (PRESENCE) IN URINE BY SCREEN METHOD: ABNORMAL
BARBITURATES PRESENCE IN URINE BY SCREEN METHOD: ABNORMAL
CANNABINOIDS IN URINE BY SCREEN METHOD: ABNORMAL
CHLORDIAZEPOXIDE: <25 NG/ML
CLONAZEPAM: <25 NG/ML
COCAINE (PRESENCE) IN URINE BY SCREEN METHOD: ABNORMAL
CODEINE: <50 NG/ML
CREATINE, URINE FOR DRUG: 114.5 MG/DL
DIAZEPAM: <25 NG/ML
DRUG SCREEN COMMENT URINE: ABNORMAL
EDDP: <25 NG/ML
FENTANYL CONFIRMATION, URINE: <2.5 NG/ML
HYDROCODONE: 310 NG/ML
HYDROMORPHONE: 72 NG/ML
LORAZEPAM: <25 NG/ML
METHADONE CONFIRMATION,URINE: <25 NG/ML
MIDAZOLAM: <25 NG/ML
MORPHINE URINE: <50 NG/ML
NORDIAZEPAM: <25 NG/ML
NORFENTANYL: <2.5 NG/ML
NORHYDROCODONE: 633 NG/ML
NOROXYCODONE: <25 NG/ML
O-DESMETHYLTRAMADOL: >1000 NG/ML
OXAZEPAM: <25 NG/ML
OXYCODONE: <25 NG/ML
OXYMORPHONE: <25 NG/ML
PHENCYCLIDINE (PRESENCE) IN URINE BY SCREEN METHOD: ABNORMAL
TEMAZEPAM: <25 NG/ML
TRAMADOL: >1000 NG/ML
ZOLPIDEM METABOLITE (ZCA): <25 NG/ML
ZOLPIDEM: <25 NG/ML

## 2023-08-09 DIAGNOSIS — E11.9 TYPE 2 DIABETES MELLITUS WITHOUT COMPLICATION, WITHOUT LONG-TERM CURRENT USE OF INSULIN (MULTI): ICD-10-CM

## 2023-08-09 RX ORDER — SEMAGLUTIDE 0.68 MG/ML
0.25 INJECTION, SOLUTION SUBCUTANEOUS
Qty: 3 ML | Refills: 0 | COMMUNITY
Start: 2023-08-09 | End: 2023-08-17

## 2023-08-09 NOTE — TELEPHONE ENCOUNTER
Dr Bingham patient.   Asking for a sample of Ozempic.   She is in the donut hole through medicare and this med will cost her over $300.    Sample pending.       Please route to pool upon approval so we can get that ready and give her a call

## 2023-08-14 ENCOUNTER — PROCEDURE VISIT (OUTPATIENT)
Dept: PAIN MANAGEMENT | Age: 68
End: 2023-08-14

## 2023-08-14 DIAGNOSIS — M54.16 LUMBAR RADICULOPATHY: ICD-10-CM

## 2023-08-14 RX ORDER — SODIUM CHLORIDE 9 MG/ML
10 INJECTION INTRAVENOUS ONCE
Status: COMPLETED | OUTPATIENT
Start: 2023-08-14 | End: 2023-08-14

## 2023-08-14 RX ORDER — DEXAMETHASONE SODIUM PHOSPHATE 10 MG/ML
10 INJECTION, SOLUTION INTRAMUSCULAR; INTRAVENOUS ONCE
Status: COMPLETED | OUTPATIENT
Start: 2023-08-14 | End: 2023-08-14

## 2023-08-14 RX ADMIN — SODIUM CHLORIDE 10 ML: 9 INJECTION INTRAVENOUS at 10:39

## 2023-08-14 RX ADMIN — DEXAMETHASONE SODIUM PHOSPHATE 10 MG: 10 INJECTION, SOLUTION INTRAMUSCULAR; INTRAVENOUS at 10:40

## 2023-08-17 ENCOUNTER — TELEMEDICINE (OUTPATIENT)
Dept: PHARMACY | Facility: HOSPITAL | Age: 68
End: 2023-08-17
Payer: MEDICARE

## 2023-08-17 DIAGNOSIS — E11.9 TYPE 2 DIABETES MELLITUS WITHOUT COMPLICATION, WITHOUT LONG-TERM CURRENT USE OF INSULIN (MULTI): Primary | ICD-10-CM

## 2023-08-17 DIAGNOSIS — E11.9 TYPE 2 DIABETES MELLITUS WITHOUT COMPLICATION, WITHOUT LONG-TERM CURRENT USE OF INSULIN (MULTI): ICD-10-CM

## 2023-08-17 NOTE — PROGRESS NOTES
"Pharmacist Clinic: Diabetes Management  Marianna Buck is a 67 y.o. female was referred to Clinical Pharmacy Team for diabetes management.     Referring Provider: Tom Bingham DO     HISTORY OF PRESENT ILLNESS  Spoke with patient today regarding diabetes management. Patient remains adherent to metformin therapy, however hit the donut hole and has not been taking Trulicity. Patient has already tried to apply for patient assistance and was denied due to income. Patient states blood sugars have remained stable.    LAB REVIEW   Glucose (mg/dL)   Date Value   03/31/2023 263 (H)   03/30/2023 267 (H)   03/28/2023 164 (H)     POC HEMOGLOBIN A1c (%)   Date Value   08/02/2023 6.2     Hemoglobin A1C (%)   Date Value   10/19/2022 6.9 (A)   04/25/2022 7.7 (A)     Bicarbonate (mmol/L)   Date Value   03/31/2023 26   03/30/2023 27   03/28/2023 26     Urea Nitrogen (mg/dL)   Date Value   03/31/2023 23   03/30/2023 19   03/28/2023 23     Creatinine (mg/dL)   Date Value   03/31/2023 0.75   03/30/2023 0.85   03/28/2023 1.02     Lab Results   Component Value Date    HGBA1C 6.2 08/02/2023    HGBA1C 6.9 (A) 10/19/2022    HGBA1C 7.7 (A) 04/25/2022     Lab Results   Component Value Date    CREATININE 0.75 03/31/2023     Lab Results   Component Value Date    CHOL 153 04/25/2022    CHOL 132 06/05/2020     Lab Results   Component Value Date    HDL 44.0 04/25/2022    HDL 29.0 (A) 06/05/2020     No results found for: \"LDLCALC\"  Lab Results   Component Value Date    TRIG 189 (H) 04/25/2022    TRIG 275 (H) 06/05/2020     No components found for: \"CHOLHDL\"  No results found for: \"LDLCALC\"    DIABETES ASSESSMENT    CURRENT PHARMACOTHERAPY  - metformin 500 mg BID with meals    SMBG MEASUREMENTS  Patient using Freestyle Yefri to test blood sugar. Did not provide averages at appt, however states fasting blood sugar was 134 mg/dL this morning.    RECOMMENDATIONS/PLAN  1. Patients diabetes is well controlled with most recent A1c of 6.9% (goal < 7 " %).   - Continue: metformin 500 mg daily in the evening  - Start: Janumet  mg daily in the morning.  - Continue: Continue all meds under the continuation of care with the referring provider and clinical pharmacy team.    Clinical Pharmacist follow up: 8/28/2023 @ 12 pm    Thank you,  Lorene Giron, PharmD    Verbal consent to manage patient's drug therapy was obtained from patient. They were informed they may decline to participate or withdraw from participation in pharmacy services at any time.

## 2023-08-28 ENCOUNTER — TELEMEDICINE (OUTPATIENT)
Dept: PHARMACY | Facility: HOSPITAL | Age: 68
End: 2023-08-28
Payer: MEDICARE

## 2023-08-28 DIAGNOSIS — E11.9 TYPE 2 DIABETES MELLITUS WITHOUT COMPLICATION, WITHOUT LONG-TERM CURRENT USE OF INSULIN (MULTI): ICD-10-CM

## 2023-08-28 NOTE — PROGRESS NOTES
"Pharmacist Clinic: Diabetes Management  Marianna Buck is a 67 y.o. female was referred to Clinical Pharmacy Team for diabetes management.     Referring Provider: Tom Bingham DO     HISTORY OF PRESENT ILLNESS  Spoke with patient today regarding diabetes management. Patient remains adherent to metformin and Janumet therapy. Patient states blood sugars have remained stable, and had two readings less than 100 mg/dL. Patient reports feeling shaky/knows when blood sugar is low.    LAB REVIEW   Glucose (mg/dL)   Date Value   03/31/2023 263 (H)   03/30/2023 267 (H)   03/28/2023 164 (H)     POC HEMOGLOBIN A1c (%)   Date Value   08/02/2023 6.2     Hemoglobin A1C (%)   Date Value   10/19/2022 6.9 (A)   04/25/2022 7.7 (A)     Bicarbonate (mmol/L)   Date Value   03/31/2023 26   03/30/2023 27   03/28/2023 26     Urea Nitrogen (mg/dL)   Date Value   03/31/2023 23   03/30/2023 19   03/28/2023 23     Creatinine (mg/dL)   Date Value   03/31/2023 0.75   03/30/2023 0.85   03/28/2023 1.02     Lab Results   Component Value Date    HGBA1C 6.2 08/02/2023    HGBA1C 6.9 (A) 10/19/2022    HGBA1C 7.7 (A) 04/25/2022     Lab Results   Component Value Date    CREATININE 0.75 03/31/2023     Lab Results   Component Value Date    CHOL 153 04/25/2022    CHOL 132 06/05/2020     Lab Results   Component Value Date    HDL 44.0 04/25/2022    HDL 29.0 (A) 06/05/2020     No results found for: \"LDLCALC\"  Lab Results   Component Value Date    TRIG 189 (H) 04/25/2022    TRIG 275 (H) 06/05/2020     No components found for: \"CHOLHDL\"  No results found for: \"LDLCALC\"    DIABETES ASSESSMENT    CURRENT PHARMACOTHERAPY  - metformin 500 mg daily in the evening  - Janumet  mg daily in the morning.    SMBG MEASUREMENTS  Patient using Freestyle Yefri to test blood sugar.   Fastings: ~100s mg/dL  2 hours post-prandial: ~150-180s    RECOMMENDATIONS/PLAN  1. Patients diabetes is well controlled with most recent A1c of 6.9% (goal < 7 %).   - Continue:   - " metformin 500 mg daily in the evening  - Janumet  mg daily in the morning.  - Continue: Continue all meds under the continuation of care with the referring provider and clinical pharmacy team.    - Advised patient another pharmacist will be reaching out to patient and  at follow-up appointment.    Clinical Pharmacist follow up: 9/25/2023 @ 12 pm    Thank you,  Lorene Giron, [place]    Verbal consent to manage patient's drug therapy was obtained from patient. They were informed they may decline to participate or withdraw from participation in pharmacy services at any time.

## 2023-09-08 DIAGNOSIS — M54.9 BACK PAIN, UNSPECIFIED BACK LOCATION, UNSPECIFIED BACK PAIN LATERALITY, UNSPECIFIED CHRONICITY: ICD-10-CM

## 2023-09-08 DIAGNOSIS — M25.562 LEFT KNEE PAIN, UNSPECIFIED CHRONICITY: ICD-10-CM

## 2023-09-08 RX ORDER — TRAMADOL HYDROCHLORIDE 50 MG/1
50 TABLET ORAL EVERY 6 HOURS PRN
Qty: 120 TABLET | Refills: 1 | Status: SHIPPED | OUTPATIENT
Start: 2023-09-08 | End: 2023-09-08

## 2023-09-08 NOTE — TELEPHONE ENCOUNTER
PATIENT IS REQUESTING TO HAVE A REFILL ON THIS SCRIPT SO SHE WILL NOT HAVE TO CALL IN THE OFFICE FOR NEXT SCRIPT. STATES THAT SHE WAS SUPPOSE TO HAVE 2 REFILLS ON THE 1ST ONE BUT WAS NEVER ADDED.    Rx Refill Request Telephone Encounter    Name: Marianna Buck  :  1955    Medication Name:   TRAMADOL  Dose (Optional):   50 MG  Quantity (Optional):   120  Directions (Optional):   TAKE 1 EVERY 6 HOURS    ALLERGIES:    SEE LIST    LAST DRUG SCREEN:     2023  LAST MED CONTRACT:    2023    Specific Pharmacy location:    [facility]    Date of last appointment:    2023  Date of next appointment:    2023    Best number to reach patient:    [phone]

## 2023-09-25 ENCOUNTER — TELEMEDICINE (OUTPATIENT)
Dept: PHARMACY | Facility: HOSPITAL | Age: 68
End: 2023-09-25
Payer: MEDICARE

## 2023-09-25 DIAGNOSIS — E11.9 DIABETES (MULTI): ICD-10-CM

## 2023-09-25 DIAGNOSIS — E11.9 TYPE 2 DIABETES MELLITUS WITHOUT COMPLICATION, WITHOUT LONG-TERM CURRENT USE OF INSULIN (MULTI): ICD-10-CM

## 2023-09-25 RX ORDER — BLOOD-GLUCOSE SENSOR
EACH MISCELLANEOUS
Qty: 2 EACH | Refills: 11 | Status: SHIPPED | OUTPATIENT
Start: 2023-09-25 | End: 2023-09-25

## 2023-09-25 ASSESSMENT — ENCOUNTER SYMPTOMS
UNEXPECTED WEIGHT CHANGE: 0
CONSTIPATION: 0
HEADACHES: 0
POLYPHAGIA: 0
VOMITING: 0
CONFUSION: 0
DYSURIA: 0
DIARRHEA: 0
DIZZINESS: 0
NAUSEA: 0
FREQUENCY: 0
POLYDIPSIA: 0

## 2023-09-25 NOTE — ASSESSMENT & PLAN NOTE
Patient identified self-management goal:  keep sugars at goal and lose weight  Patient's goal A1c is < 7%.  Is pt at goal? Yes, 6.2% on 23  Patient's SMBGs are at goal.   Rationale for plan: Patient's sugars are at goal, not requiring changes to regimen at this time. Will explore ability to add back GLP1/GIP agent pending cost.    Medication Changes:  Continue  Janumet 50-500mg QAM  Metformin 500mg at bedtime    Monitoring and Education:  Patient was invited via email to share Yefri readings with practice account.    Diabetes Education  Rule of 15: eating ~15 g of carbs when BG less than 80 (half cup juice, 3-4 glucose tabs).  Recognize symptoms of high and low blood sugar.   Eat a realistic healthy diet consisting of fruits, vegetables, fiber, protein food choices on a regular basis and be aware of portion/serving sizes. Reduce carbohydrate consumption and always consume with protein and fat. Avoid foods high in saturated/trans fat, high salt content, and sweets and beverages with added sugars.  Limit alcohol consumption; alcohol may affect your blood sugar and cause hypoglycemia.   Stay active and incorporate ~30 mins of exercise into your daily routine to manage your weight and increase the body's acceptance of insulin.    PATIENT GOALS   Fasting B - 130 mg/dL 2-HR Postprandial BG:   Less than 180 mg/dL A1c:   Less than 7.0 %

## 2023-09-25 NOTE — PROGRESS NOTES
Patient ID: Marianna Buck is a 67 y.o. female who presents for Diabetes.    Referring Provider: Tom Bingham DO   Pt is here for follow-up appointment. Last visit with PharmD: 8/28/23 Last visit with PCP: 8/2/23    Verbal consent to manage patient's drug therapy was obtained from patient. They were informed they may decline to participate or withdraw from participation in pharmacy services at any time. Patient is agreeable to detailed voice messages if unable to be reached.       Subjective   Treatment Adherence:   Patient did take medications today.   Number of missed doses in last 7 days: 0.  Why? 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 pepper      HPI  Diabetes Mellitus Type 2:  Diagnosed with diabetes:  >1 yr. Known diabetic complications: none. Optometry exam current (within 1year):  No, not found in chart review  Most recent visit in Podiatry was on - not found in chart review     Current diabetic medications include:  Janumet 50-500mg QAM  Metformin 500mg at bedtime    Patient confirms above regimen.  Clarifications: stopped Trulicity 1mo ago    Glucometer was not present at appointment. Patient tests SMBGS - CGM with smartphone  Home blood glucose  records (mg/dL)    Freestyle Yefri 3  Sensor Capture   14 day capture rate = not available   Average Blood Sugars  14 day average = 119-149  GMI = 6.4%   Time in target (14 days)  Very High >250 = 0%  High         181-250 = 2%  Target       = 98%  Low          54-69 = 0%  Very Low  <54 = 0%   Number of Low Events (14 days)  #  of times BG < 70mg/dL = 0       Any episodes of hypoglycemia? No, pt denies .  Did patient treat episode of hypoglycemia appropriately? N/A  Does pt have proteinuria? No, NONE FOUND  If appropriate, is patient on ACEi/ARB? N/A    Secondary Prevention  Statin? No,   .  ACE-I/ARB? N/A.  Aspirin? Yes, 81mg daily .    Pertinent PMH Review:  PMH of Pancreatitis: No  PMH of Retinopathy: No  PMH of Urinary Tract Infections: Yes  PMH of MTC: No    Review of Systems   Constitutional:  Negative for unexpected weight change.   Eyes:  Negative for visual disturbance.   Gastrointestinal:  Negative for constipation, diarrhea, nausea and vomiting.   Endocrine: Negative for polydipsia, polyphagia and polyuria.   Genitourinary:  Negative for dysuria, frequency and urgency.   Skin:  Positive for rash.        Pt c/o fungal type rash in skin folds above groin area   Neurological:  Negative for dizziness, syncope and headaches.   Psychiatric/Behavioral:  Negative for confusion.        Objective     There were no vitals taken for this visit.     Labs  Lab Results   Component Value Date    BILITOT 0.8 03/28/2023    CALCIUM 8.3 (L) 03/31/2023    CO2 26 03/31/2023    CL 98 03/31/2023    CREATININE 0.75 03/31/2023    GLUCOSE 263 (H) 03/31/2023    ALKPHOS 73 03/28/2023    K 3.9 03/31/2023    PROT 8.0 03/28/2023     (L) 03/31/2023    AST 24 03/28/2023    ALT 22 03/28/2023    BUN 23 03/31/2023    ANIONGAP 14 03/31/2023    MG 2.00 06/04/2020    ALBUMIN 4.0 03/28/2023    LIPASE 55 09/29/2018    GFRF 88 03/31/2023     Lab Results   Component Value Date    TRIG 189 (H) 04/25/2022    CHOL 153 04/25/2022    HDL 44.0  04/25/2022     Lab Results   Component Value Date    HGBA1C 6.2 08/02/2023       Current Outpatient Medications on File Prior to Visit   Medication Sig Dispense Refill    metFORMIN (Glucophage) 500 mg tablet Take 1 tablet (500 mg) by mouth in the morning and 1 tablet (500 mg) in the evening. Take with meals. 180 tablet 1    [DISCONTINUED] blood-glucose sensor (FreeStyle Yefri 3 Sensor) device Use as directed to test blood sugar up to 2 times daily. 2 each 11    ACCU-CHEK SOFTCLIX LANCETS MISC 1 Lancet  in the morning and 1 Lancet before bedtime. Use as directed to test blood sugar twice daily.      aspirin 81 mg EC tablet Take 1 tablet (81 mg) by mouth.      blood sugar diagnostic (Accu-Chek Guide test strips) strip 1 each  in the morning and 1 each in the evening. TEST TWICE DAILY.      cyclobenzaprine (Flexeril) 10 mg tablet Take 0.5-1 tablets (5-10 mg) by mouth as needed at bedtime for muscle spasms. TAKE 1/2 OR 1 TABLET BY MOUTH NIGHTLY AS NEEDED 180 tablet 1    docusate sodium (Colace) 100 mg capsule Take 1 capsule (100 mg) by mouth in the morning and 1 capsule (100 mg) before bedtime.      DULoxetine (Cymbalta) 60 mg DR capsule Take 1 capsule (60 mg) by mouth once daily. 90 capsule 1    estradiol (Estrace) 0.5 mg tablet Take 1 tablet (0.5 mg) by mouth once daily. 90 tablet 1    levothyroxine (Synthroid, Levoxyl) 50 mcg tablet Take 1 tablet (50 mcg) by mouth once daily. 90 tablet 1    magnesium oxide (Mag-Ox) 400 mg tablet Take 1 tablet (400 mg) by mouth once daily.      metoprolol succinate XL (Toprol-XL) 25 mg 24 hr tablet Take 1 tablet (25 mg) by mouth once daily. 90 tablet 1    metroNIDAZOLE (Metrogel) 1 % gel Apply 1 Application topically once daily. APPLY SPARINGLY TO AFFECTED AREA(S) ONCE DAILY      naloxone (Narcan) 4 mg/0.1 mL nasal spray Administer 1 spray (4 mg) into affected nostril(s) if needed for opioid reversal. May repeat every 2-3 minutes if needed, alternating nostrils, until medical  assistance becomes available. 2 each 0    omeprazole (PriLOSEC) 40 mg DR capsule Take 1 capsule (40 mg) by mouth once daily in the morning. Take before meals. 90 capsule 1    potassium chloride ER (Micro-K) 10 mEq ER capsule Take 2 capsules (20 mEq) by mouth once daily. 90 capsule 1    pregabalin (Lyrica) 150 mg capsule Take 1 capsule (150 mg) by mouth in the morning and 1 capsule (150 mg) in the evening and 1 capsule (150 mg) before bedtime. 270 capsule 1    traMADol (Ultram) 50 mg tablet Take 1 tablet (50 mg) by mouth every 6 hours if needed for severe pain (7 - 10). 120 tablet 1    triamterene-hydrochlorothiazid (Dyazide) 37.5-25 mg capsule Take 1 capsule by mouth once daily in the morning. 90 capsule 1     No current facility-administered medications on file prior to visit.        Assessment/Plan   Problem List Items Addressed This Visit             ICD-10-CM    Diabetes (CMS/[facility]) E11.9     Patient identified self-management goal:  keep sugars at goal and lose weight  Patient's goal A1c is < 7%.  Is pt at goal? Yes, 6.2% on 8/2/23  Patient's SMBGs are at goal.   Rationale for plan: Patient's sugars are at goal, not requiring changes to regimen at this time. Will explore ability to add back GLP1/GIP agent pending cost.    Medication Changes:  Continue  Janumet 50-500mg QAM  Metformin 500mg at bedtime    Monitoring and Education:  Patient was invited via email to share Yefri readings with practice account.    Diabetes Education  Rule of 15: eating ~15 g of carbs when BG less than 80 (half cup juice, 3-4 glucose tabs).  Recognize symptoms of high and low blood sugar.   Eat a realistic healthy diet consisting of fruits, vegetables, fiber, protein food choices on a regular basis and be aware of portion/serving sizes. Reduce carbohydrate consumption and always consume with protein and fat. Avoid foods high in saturated/trans fat, high salt content, and sweets and beverages with added sugars.  Limit alcohol  consumption; alcohol may affect your blood sugar and cause hypoglycemia.   Stay active and incorporate ~30 mins of exercise into your daily routine to manage your weight and increase the body's acceptance of insulin.    PATIENT GOALS   Fasting B - 130 mg/dL 2-HR Postprandial BG:   Less than 180 mg/dL A1c:   Less than 7.0 %            Relevant Medications    blood-glucose sensor (FreeStyle Yefri 3 Sensor) device    Other Relevant Orders    Albumin , Urine Random    Vitamin B12    Follow Up In [facility] - Pharmacy       Test claims showed Mounjaro is not covered. Ozempic would be $262.68/28d supply.     Labs ordered:  UACR, B12 - reminded to get labs done that were ordered by Dr. Bingham     Immunizations: Not up to date - COVID #3, Tdap, Pneumococcal - chart review limited by EMR conversion     Referrals:  none     Follow-up:  at 1pm via telephone      Time spent with pt: Total length of time 26 (minutes) of the encounter and more than 50% was spent counseling the patient.      Dorcas Barragan, [place], ESPINOZA  Clinical Pharmacist  Pharmacy Services  [phone]    Continue all meds under the continuation of care with the referring provider and clinical pharmacy team.    Verbal consent to manage patient's drug therapy was obtained from [the patient and/or an individual authorized to act on behalf of a patient]. They were informed they may decline to participate or withdraw from participation in pharmacy services at any time.

## 2023-10-09 ENCOUNTER — APPOINTMENT (OUTPATIENT)
Dept: PHARMACY | Facility: HOSPITAL | Age: 68
End: 2023-10-09
Payer: MEDICARE

## 2023-10-12 ENCOUNTER — PHARMACY VISIT (OUTPATIENT)
Dept: PHARMACY | Facility: CLINIC | Age: 68
End: 2023-10-12
Payer: MEDICARE

## 2023-10-12 PROCEDURE — RXMED WILLOW AMBULATORY MEDICATION CHARGE

## 2023-10-14 ENCOUNTER — PHARMACY VISIT (OUTPATIENT)
Dept: PHARMACY | Facility: CLINIC | Age: 68
End: 2023-10-14
Payer: MEDICARE

## 2023-10-20 ENCOUNTER — PHARMACY VISIT (OUTPATIENT)
Dept: PHARMACY | Facility: CLINIC | Age: 68
End: 2023-10-20
Payer: MEDICARE

## 2023-10-24 ENCOUNTER — PHARMACY VISIT (OUTPATIENT)
Dept: PHARMACY | Facility: CLINIC | Age: 68
End: 2023-10-24
Payer: MEDICARE

## 2023-10-30 ENCOUNTER — TELEMEDICINE (OUTPATIENT)
Dept: PHARMACY | Facility: HOSPITAL | Age: 68
End: 2023-10-30
Payer: MEDICARE

## 2023-10-30 ENCOUNTER — PHARMACY VISIT (OUTPATIENT)
Dept: PHARMACY | Facility: CLINIC | Age: 68
End: 2023-10-30
Payer: MEDICARE

## 2023-10-30 DIAGNOSIS — E11.9 TYPE 2 DIABETES MELLITUS WITHOUT COMPLICATION, WITHOUT LONG-TERM CURRENT USE OF INSULIN (MULTI): ICD-10-CM

## 2023-10-30 PROCEDURE — RXMED WILLOW AMBULATORY MEDICATION CHARGE

## 2023-10-30 RX ORDER — BLOOD-GLUCOSE SENSOR
EACH MISCELLANEOUS
Qty: 3 EACH | Refills: 11 | Status: SHIPPED | OUTPATIENT
Start: 2023-10-30 | End: 2024-01-31 | Stop reason: SDUPTHER

## 2023-10-30 NOTE — PROGRESS NOTES
Patient ID: Marianna Buck is a 67 y.o. female who presents for No chief complaint on file..    Referring Provider: Tom Bingham DO   Pt is here for follow-up appointment. Last visit with PharmD: 9/25/23 Last visit with PCP: 8/2/23 (next on 11.1.23)    Verbal consent to manage patient's drug therapy was obtained from patient. They were informed they may decline to participate or withdraw from participation in pharmacy services at any time. Patient is agreeable to detailed voice messages if unable to be reached.       Subjective   Treatment Adherence:   Patient did take medications today.   Number of missed doses in last 7 days: 0.  Why? 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 merchant      Patient complains that fawad sensors have been falling out. Will send for Dexcom since it comes with overpatch.      HPI  Diabetes Mellitus Type 2:  Diagnosed with diabetes:  >1 yr. Known diabetic complications: none. Optometry exam current (within 1year):  No, not found in chart review  Most recent visit in Podiatry was on - not found in chart review     Current diabetic medications include:  Janumet 50-500mg QAM  Metformin 500mg at bedtime    Patient confirms above  regimen.    Glucometer was not present at appointment. Patient tests SMBGS - CGM with smartphone  Home blood glucose records (mg/dL)  FB this morning      Any episodes of hypoglycemia? No, pt denies .  Did patient treat episode of hypoglycemia appropriately? N/A  Does pt have proteinuria? No, NONE FOUND  If appropriate, is patient on ACEi/ARB? N/A    Secondary Prevention  Statin? No,   .  ACE-I/ARB? N/A.  Aspirin? Yes, 81mg daily .    Pertinent PMH Review:  PMH of Pancreatitis: No  PMH of Retinopathy: No  PMH of Urinary Tract Infections: Yes  PMH of MTC: No      Review of Systems    Objective     There were no vitals taken for this visit.     Labs  Lab Results   Component Value Date    BILITOT 0.8 2023    CALCIUM 8.3 (L) 2023    CO2 26 2023    CL 98 2023    CREATININE 0.75 2023    GLUCOSE 263 (H) 2023    ALKPHOS 73 2023    K 3.9 2023    PROT 8.0 2023     (L) 2023    AST 24 2023    ALT 22 2023    BUN 23 2023    ANIONGAP 14 2023    MG 2.00 2020    ALBUMIN 4.0 2023    LIPASE 55 2018    GFRF 88 2023     Lab Results   Component Value Date    TRIG 189 (H) 2022    CHOL 153 2022    HDL 44.0 2022     Lab Results   Component Value Date    HGBA1C 6.2 2023       Current Outpatient Medications on File Prior to Visit   Medication Sig Dispense Refill    ACCU-CHEK SOFTCLIX LANCETS MISC 1 Lancet  in the morning and 1 Lancet before bedtime. Use as directed to test blood sugar twice daily.      aspirin 81 mg EC tablet Take 1 tablet (81 mg) by mouth.      blood sugar diagnostic (Accu-Chek Guide test strips) strip 1 each  in the morning and 1 each in the evening. TEST TWICE DAILY.      blood-glucose sensor device USE AS DIRECTED TO CHECK BLOOD SUGAR. CHANGE SENSOR EVERY 14 DAYS. 2 each 11    celecoxib (CeleBREX) 200 mg capsule TAKE 1 CAPSULE BY MOUTH EVERY MORNING AND AT BEDTIME 180 capsule 1     ciprofloxacin (Cipro) 250 mg tablet TAKE 1 TABLET BY MOUTH TWO TIMES A DAY FOR 14 DAYS 14 tablet 1    cyclobenzaprine (Flexeril) 10 mg tablet Take 0.5-1 tablets (5-10 mg) by mouth as needed at bedtime for muscle spasms. TAKE 1/2 OR 1 TABLET BY MOUTH NIGHTLY AS NEEDED 180 tablet 1    docusate sodium (Colace) 100 mg capsule Take 1 capsule (100 mg) by mouth in the morning and 1 capsule (100 mg) before bedtime.      DULoxetine (Cymbalta) 60 mg DR capsule Take 1 capsule (60 mg) by mouth once daily. 90 capsule 1    estradiol (Estrace) 0.5 mg tablet Take 1 tablet (0.5 mg) by mouth once daily. 90 tablet 1    levothyroxine (Synthroid, Levoxyl) 50 mcg tablet Take 1 tablet (50 mcg) by mouth once daily. 90 tablet 1    magnesium oxide (Mag-Ox) 400 mg tablet Take 1 tablet (400 mg) by mouth once daily.      metFORMIN (Glucophage) 500 mg tablet Take 1 tablet (500 mg) by mouth in the morning and 1 tablet (500 mg) in the evening. Take with meals. 180 tablet 1    metoprolol succinate XL (Toprol-XL) 25 mg 24 hr tablet Take 1 tablet (25 mg) by mouth once daily. 90 tablet 1    metroNIDAZOLE (Metrogel) 1 % gel Apply 1 Application topically once daily. APPLY SPARINGLY TO AFFECTED AREA(S) ONCE DAILY      naloxone (Narcan) 4 mg/0.1 mL nasal spray Administer 1 spray (4 mg) into affected nostril(s) if needed for opioid reversal. May repeat every 2-3 minutes if needed, alternating nostrils, until medical assistance becomes available. 2 each 0    neomycin-polymyxin-HC (Cortisporin) 3.5-10,000-1 mg/mL-unit/mL-% otic suspension INSTILL 4 DROPS IN THE LEFT EAR EVERY 8 HOURS FOR 5 DAYS 60 mL 0    omeprazole (PriLOSEC) 40 mg DR capsule Take 1 capsule (40 mg) by mouth once daily in the morning. Take before meals. 90 capsule 1    potassium chloride ER (Micro-K) 10 mEq ER capsule Take 2 capsules (20 mEq) by mouth once daily. 90 capsule 1    pregabalin (Lyrica) 150 mg capsule Take 1 capsule (150 mg) by mouth in the morning and 1 capsule (150 mg) in the  evening and 1 capsule (150 mg) before bedtime. 270 capsule 1    traMADol (Ultram) 50 mg tablet TAKE 1 TABLET BY MOUTH EVERY 6 HOURS AS NEEDED FOR SEVERE PAIN(7-10) 120 tablet 1    triamterene-hydrochlorothiazid (Dyazide) 37.5-25 mg capsule Take 1 capsule by mouth once daily in the morning. 90 capsule 1    triamterene-hydrochlorothiazid (Maxzide-25) 37.5-25 mg tablet TAKE 1 TABLET BY MOUTH EVERY MORNING 90 tablet 1     No current facility-administered medications on file prior to visit.        Assessment/Plan   Problem List Items Addressed This Visit             ICD-10-CM    Diabetes (CMS/[facility]) E11.9     Patient identified self-management goal:  keep sugars at goal and lose weight  Patient's goal A1c is < 7%.  Is pt at goal? Yes, 6.2% on 8/2/23  Patient's SMBGs are at goal.   Rationale for plan: Patient's sugars are at goal, not requiring changes to regimen at this time. Will explore ability to add back GLP1/GIP in January with new insurance.    Will change Yefri to Dexcom as patient has had trouble with sensor  staying on arm.     Medication Changes:  Continue  Janumet 50-500mg QAM  Metformin 500mg at bedtime     Monitoring and Education:  Patient was invited via email to share Yefri readings with practice account.     Diabetes Education  Rule of 15: eating ~15 g of carbs when BG less than 80 (half cup juice, 3-4 glucose tabs).  Recognize symptoms of high and low blood sugar.   Eat a realistic healthy diet consisting of fruits, vegetables, fiber, protein food choices on a regular basis and be aware of portion/serving sizes. Reduce carbohydrate consumption and always consume with protein and fat. Avoid foods high in saturated/trans fat, high salt content, and sweets and beverages with added sugars.  Limit alcohol consumption; alcohol may affect your blood sugar and cause hypoglycemia.   Stay active and incorporate ~30 mins of exercise into your daily routine to manage your weight and increase the body's acceptance of  insulin.          PATIENT GOALS   Fasting B - 130 mg/dL 2-HR Postprandial BG:   Less than 180 mg/dL A1c:   Less than 7.0 %                     Labs ordered:  none- reminded to get labs done that were ordered by Dr. Bingham     Immunizations: Not up to date - COVID #3, Tdap, Pneumococcal - chart review limited by EMR conversion     Referrals:  none     Follow-up: 2023  at 12:30 via telephone     Time spent with pt: Total length of time 16 (minutes) of the encounter and more than 50% was spent counseling the patient.      Dorcas Barragan PharmD, ESPINOZA  Clinical Pharmacist  Pharmacy Services  [phone]    Continue all meds under the continuation of care with the referring provider and clinical pharmacy team.    Verbal consent to manage patient's drug therapy was obtained from the patient. They were informed they may decline to participate or withdraw from participation in pharmacy services at any time.

## 2023-10-30 NOTE — ASSESSMENT & PLAN NOTE
Patient identified self-management goal:  keep sugars at goal and lose weight  Patient's goal A1c is < 7%.  Is pt at goal? Yes, 6.2% on 23  Patient's SMBGs are at goal.   Rationale for plan: Patient's sugars are at goal, not requiring changes to regimen at this time. Will explore ability to add back GLP1/GIP in January with new insurance.    Will change Yefri to Dexcom as patient has had trouble with sensor  staying on arm.     Medication Changes:  Continue  Janumet 50-500mg QAM  Metformin 500mg at bedtime     Monitoring and Education:  Patient was invited via email to share Yefri readings with practice account.     Diabetes Education  Rule of 15: eating ~15 g of carbs when BG less than 80 (half cup juice, 3-4 glucose tabs).  Recognize symptoms of high and low blood sugar.   Eat a realistic healthy diet consisting of fruits, vegetables, fiber, protein food choices on a regular basis and be aware of portion/serving sizes. Reduce carbohydrate consumption and always consume with protein and fat. Avoid foods high in saturated/trans fat, high salt content, and sweets and beverages with added sugars.  Limit alcohol consumption; alcohol may affect your blood sugar and cause hypoglycemia.   Stay active and incorporate ~30 mins of exercise into your daily routine to manage your weight and increase the body's acceptance of insulin.          PATIENT GOALS   Fasting B - 130 mg/dL 2-HR Postprandial BG:   Less than 180 mg/dL A1c:   Less than 7.0 %

## 2023-10-31 ENCOUNTER — PHARMACY VISIT (OUTPATIENT)
Dept: PHARMACY | Facility: CLINIC | Age: 68
End: 2023-10-31
Payer: MEDICARE

## 2023-10-31 PROCEDURE — RXMED WILLOW AMBULATORY MEDICATION CHARGE

## 2023-10-31 NOTE — PROGRESS NOTES
"Subjective   Patient ID: Marianna Buck is a 67 y.o. female who presents for Pain.    HPI    Patient presents today for pain follow up.  Rates the pain a 8/10 over the past 7 days.  Reports that the medication gives 50% pain control/relief.  [place] today.  Controlled substance agreement signed 8/2/236  Last took medication this morning.      She states that she is not able to afford Ozempic so she has not been taking it.    Pt is receiving flu shot today    No other concern  Review of systems  ; Patient seen today for exam denies any problems with headaches or vision, denies any shortness of breath chest pain nausea or vomiting, no black stool no blood in the stool no heartburn type symptoms denies any problems with constipation or diarrhea, and no dysuria-type symptoms    The patient's allergies medications were reviewed with them today    The patient's social family and surgical history or also reviewed here today, along with her past medical history.     Objective     Alert and active in  no acute distress  HEENT TMs clear oropharynx negative nares clear no drainage noted neck supple  With no adenopathy   Heart regular rate and rhythm without murmur and no carotid bruits  Lungs- clear to auscultation bilaterally, no wheeze or rhonchi noted  Thyroid -negative masses or nodularity  Abdomen- soft times four quadrants , bowel sounds positive no masses or organomegaly, negative tenderness guarding or rebound  Neurological exam unremarkable- DTRs in upper and lower extremities within normal limits.   skin -no lesions noted      /84 (BP Location: Right arm, Patient Position: Sitting, BP Cuff Size: Large adult)   Pulse 73   Temp 36.3 °C (97.3 °F) (Temporal)   Resp 16   Ht 1.676 m (5' 6\")   Wt 116 kg (256 lb 6.4 oz)   SpO2 98%   BMI 41.38 kg/m²     Allergies   Allergen Reactions    Oxycodone-Acetaminophen Other    Sulfa (Sulfonamide Antibiotics) Other     Adverse reaction    Codeine Rash "       Assessment/Plan   Problem List Items Addressed This Visit       Chronic back pain    Diabetes (CMS/HCC)    Relevant Medications    semaglutide 0.25 mg or 0.5 mg (2 mg/3 mL) pen injector    HTN (hypertension)    Relevant Medications    metoprolol succinate XL (Toprol-XL) 25 mg 24 hr tablet    Fibromyalgia    Rheumatoid arthritis (CMS/HCC)     Other Visit Diagnoses       Back pain, unspecified back location, unspecified back pain laterality, unspecified chronicity        Relevant Medications    traMADol (Ultram) 50 mg tablet    History of low potassium        Relevant Medications    potassium chloride ER (Micro-K) 10 mEq ER capsule    Left knee pain, unspecified chronicity        Relevant Medications    traMADol (Ultram) 50 mg tablet    Flu vaccine need        Relevant Orders    Flu vaccine, quadrivalent, high-dose, preservative free, age 65y+ (FLUZONE) (Completed)    Encounter for immunization        Relevant Orders    Pneumococcal conjugate vaccine, 20-valent (PREVNAR 20) (Completed)          Discussed patient's BMI and to institute calorie reduction and increase exercise to decrease risk of diabetes and heart disease in the future.    I personally reviewed the [place] report for this patient. I have considered the risks of abuse, dependence, addiction, and diversion.  CSA 8/2/23  UDS 8/2/23    Refill Potassium, Metoprolol, Tramadol.     Ozempic samples dispensed today.    Fluzone administered today.    Prevnar 20 administered.    If anything worsens or changes please call us at once, follow up in the office as planned.    Scribe Attestation  By signing my name below, I, Jennifer Bland MA, Scribe   attest that this documentation has been prepared under the direction and in the presence of Tom Bingham DO.

## 2023-11-01 ENCOUNTER — OFFICE VISIT (OUTPATIENT)
Dept: PRIMARY CARE | Facility: CLINIC | Age: 68
End: 2023-11-01
Payer: MEDICARE

## 2023-11-01 VITALS
DIASTOLIC BLOOD PRESSURE: 84 MMHG | HEART RATE: 73 BPM | BODY MASS INDEX: 41.21 KG/M2 | SYSTOLIC BLOOD PRESSURE: 132 MMHG | TEMPERATURE: 97.3 F | WEIGHT: 256.4 LBS | HEIGHT: 66 IN | OXYGEN SATURATION: 98 % | RESPIRATION RATE: 16 BRPM

## 2023-11-01 DIAGNOSIS — Z23 ENCOUNTER FOR IMMUNIZATION: ICD-10-CM

## 2023-11-01 DIAGNOSIS — M54.9 BACK PAIN, UNSPECIFIED BACK LOCATION, UNSPECIFIED BACK PAIN LATERALITY, UNSPECIFIED CHRONICITY: ICD-10-CM

## 2023-11-01 DIAGNOSIS — M25.562 LEFT KNEE PAIN, UNSPECIFIED CHRONICITY: ICD-10-CM

## 2023-11-01 DIAGNOSIS — I10 HYPERTENSION, UNSPECIFIED TYPE: ICD-10-CM

## 2023-11-01 DIAGNOSIS — M54.50 CHRONIC LOW BACK PAIN, UNSPECIFIED BACK PAIN LATERALITY, UNSPECIFIED WHETHER SCIATICA PRESENT: ICD-10-CM

## 2023-11-01 DIAGNOSIS — Z86.39 HISTORY OF LOW POTASSIUM: ICD-10-CM

## 2023-11-01 DIAGNOSIS — M06.9 RHEUMATOID ARTHRITIS, INVOLVING UNSPECIFIED SITE, UNSPECIFIED WHETHER RHEUMATOID FACTOR PRESENT (MULTI): ICD-10-CM

## 2023-11-01 DIAGNOSIS — G89.29 CHRONIC LOW BACK PAIN, UNSPECIFIED BACK PAIN LATERALITY, UNSPECIFIED WHETHER SCIATICA PRESENT: ICD-10-CM

## 2023-11-01 DIAGNOSIS — Z23 FLU VACCINE NEED: ICD-10-CM

## 2023-11-01 DIAGNOSIS — M79.7 FIBROMYALGIA: ICD-10-CM

## 2023-11-01 DIAGNOSIS — E11.9 TYPE 2 DIABETES MELLITUS WITHOUT COMPLICATION, WITHOUT LONG-TERM CURRENT USE OF INSULIN (MULTI): ICD-10-CM

## 2023-11-01 PROCEDURE — 3079F DIAST BP 80-89 MM HG: CPT | Performed by: FAMILY MEDICINE

## 2023-11-01 PROCEDURE — 90662 IIV NO PRSV INCREASED AG IM: CPT | Performed by: FAMILY MEDICINE

## 2023-11-01 PROCEDURE — 3075F SYST BP GE 130 - 139MM HG: CPT | Performed by: FAMILY MEDICINE

## 2023-11-01 PROCEDURE — 99214 OFFICE O/P EST MOD 30 MIN: CPT | Performed by: FAMILY MEDICINE

## 2023-11-01 PROCEDURE — 1159F MED LIST DOCD IN RCRD: CPT | Performed by: FAMILY MEDICINE

## 2023-11-01 PROCEDURE — G0009 ADMIN PNEUMOCOCCAL VACCINE: HCPCS | Performed by: FAMILY MEDICINE

## 2023-11-01 PROCEDURE — G0008 ADMIN INFLUENZA VIRUS VAC: HCPCS | Performed by: FAMILY MEDICINE

## 2023-11-01 PROCEDURE — 3008F BODY MASS INDEX DOCD: CPT | Performed by: FAMILY MEDICINE

## 2023-11-01 PROCEDURE — 90677 PCV20 VACCINE IM: CPT | Performed by: FAMILY MEDICINE

## 2023-11-01 PROCEDURE — 1036F TOBACCO NON-USER: CPT | Performed by: FAMILY MEDICINE

## 2023-11-01 PROCEDURE — 1125F AMNT PAIN NOTED PAIN PRSNT: CPT | Performed by: FAMILY MEDICINE

## 2023-11-01 RX ORDER — POTASSIUM CHLORIDE 750 MG/1
20 CAPSULE, EXTENDED RELEASE ORAL DAILY
Qty: 90 CAPSULE | Refills: 1 | Status: SHIPPED | OUTPATIENT
Start: 2023-11-01 | End: 2024-05-01 | Stop reason: WASHOUT

## 2023-11-01 RX ORDER — TRAMADOL HYDROCHLORIDE 50 MG/1
50 TABLET ORAL EVERY 6 HOURS PRN
Qty: 120 TABLET | Refills: 0 | Status: SHIPPED | OUTPATIENT
Start: 2023-11-01 | End: 2023-12-19 | Stop reason: SDUPTHER

## 2023-11-01 RX ORDER — METOPROLOL SUCCINATE 25 MG/1
25 TABLET, EXTENDED RELEASE ORAL DAILY
Qty: 90 TABLET | Refills: 1 | Status: SHIPPED | OUTPATIENT
Start: 2023-11-01 | End: 2024-02-23 | Stop reason: SDUPTHER

## 2023-11-01 NOTE — PATIENT INSTRUCTIONS

## 2023-11-08 ENCOUNTER — PHARMACY VISIT (OUTPATIENT)
Dept: PHARMACY | Facility: CLINIC | Age: 68
End: 2023-11-08
Payer: MEDICARE

## 2023-11-08 DIAGNOSIS — E11.9 TYPE 2 DIABETES MELLITUS WITHOUT COMPLICATION, WITHOUT LONG-TERM CURRENT USE OF INSULIN (MULTI): ICD-10-CM

## 2023-11-08 DIAGNOSIS — M79.7 FIBROMYALGIA: ICD-10-CM

## 2023-11-08 DIAGNOSIS — K21.9 GASTROESOPHAGEAL REFLUX DISEASE, UNSPECIFIED WHETHER ESOPHAGITIS PRESENT: ICD-10-CM

## 2023-11-08 DIAGNOSIS — N95.1 MENOPAUSAL STATE: ICD-10-CM

## 2023-11-08 DIAGNOSIS — M06.9 RHEUMATOID ARTHRITIS, INVOLVING UNSPECIFIED SITE, UNSPECIFIED WHETHER RHEUMATOID FACTOR PRESENT (MULTI): ICD-10-CM

## 2023-11-08 DIAGNOSIS — E03.9 HYPOTHYROIDISM, UNSPECIFIED TYPE: ICD-10-CM

## 2023-11-08 PROCEDURE — RXMED WILLOW AMBULATORY MEDICATION CHARGE

## 2023-11-08 RX ORDER — DULOXETIN HYDROCHLORIDE 60 MG/1
60 CAPSULE, DELAYED RELEASE ORAL DAILY
Qty: 90 CAPSULE | Refills: 1 | Status: SHIPPED | OUTPATIENT
Start: 2023-11-08 | End: 2024-05-14 | Stop reason: SDUPTHER

## 2023-11-08 RX ORDER — ESTRADIOL 0.5 MG/1
0.5 TABLET ORAL DAILY
Qty: 90 TABLET | Refills: 1 | Status: SHIPPED | OUTPATIENT
Start: 2023-11-08 | End: 2024-05-28 | Stop reason: SDUPTHER

## 2023-11-08 RX ORDER — LEVOTHYROXINE SODIUM 50 UG/1
50 TABLET ORAL DAILY
Qty: 90 TABLET | Refills: 1 | Status: SHIPPED | OUTPATIENT
Start: 2023-11-08 | End: 2024-05-14 | Stop reason: SDUPTHER

## 2023-11-08 RX ORDER — METFORMIN HYDROCHLORIDE 500 MG/1
TABLET ORAL
Qty: 180 TABLET | Refills: 1 | Status: SHIPPED | OUTPATIENT
Start: 2023-11-08 | End: 2024-05-01 | Stop reason: WASHOUT

## 2023-11-08 RX ORDER — OMEPRAZOLE 40 MG/1
40 CAPSULE, DELAYED RELEASE ORAL
Qty: 90 CAPSULE | Refills: 1 | Status: SHIPPED | OUTPATIENT
Start: 2023-11-08 | End: 2024-02-23 | Stop reason: SDUPTHER

## 2023-11-08 RX ORDER — PREGABALIN 150 MG/1
150 CAPSULE ORAL 3 TIMES DAILY
Qty: 270 CAPSULE | Refills: 1 | Status: SHIPPED | OUTPATIENT
Start: 2023-11-08 | End: 2024-01-31 | Stop reason: SDUPTHER

## 2023-11-08 RX ORDER — HYDROXYCHLOROQUINE SULFATE 200 MG/1
TABLET, FILM COATED ORAL
Qty: 180 TABLET | Refills: 1 | Status: SHIPPED | OUTPATIENT
Start: 2023-11-08 | End: 2024-01-31 | Stop reason: SDUPTHER

## 2023-11-09 ENCOUNTER — PHARMACY VISIT (OUTPATIENT)
Dept: PHARMACY | Facility: CLINIC | Age: 68
End: 2023-11-09
Payer: MEDICARE

## 2023-11-09 PROCEDURE — RXMED WILLOW AMBULATORY MEDICATION CHARGE

## 2023-11-20 ENCOUNTER — TELEMEDICINE (OUTPATIENT)
Dept: PHARMACY | Facility: HOSPITAL | Age: 68
End: 2023-11-20
Payer: MEDICARE

## 2023-11-20 DIAGNOSIS — E11.9 TYPE 2 DIABETES MELLITUS WITHOUT COMPLICATION, WITHOUT LONG-TERM CURRENT USE OF INSULIN (MULTI): ICD-10-CM

## 2023-11-20 NOTE — PROGRESS NOTES
Patient ID: Marianna Buck is a 68 y.o. female who presents for Diabetes.    Referring Provider: Tom Bingham DO   Pt is here for follow-up appointment. Last visit with PharmD: 10/30/23 Last visit with PCP: 11.1.23    Verbal consent to manage patient's drug therapy was obtained from patient. They were informed they may decline to participate or withdraw from participation in pharmacy services at any time. Patient is agreeable to detailed voice messages if unable to be reached.       Subjective   Treatment Adherence:   Patient did take medications today.   Number of missed doses in last 7 days: 0.  Why? 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 pepper      HPI  Diabetes Mellitus Type 2:  Diagnosed with diabetes:  >1 yr. Known diabetic complications: none. Optometry exam current (within 1year):  No, not found in chart review  Most recent visit in Podiatry was on - not found in chart review     Current diabetic medications include:  Metformin 500mg twice daily  Ozempic 0.25mg weekly (samples from PCP)    Patient confirms above regimen. Stopped Janumet since now on samples of Ozempic. Has had 3  doses of Ozempic.    Glucometer was not present at appointment. Patient tests SMBGS - G7  Home blood glucose records (mg/dL)  DEXCOM G7  Sensor Capture   10 day capture rate = 57%     Average Blood Sugars  14 day average = 130     Time in target (14 days)  Very High >250 = <1%  High         181-250 = 5%  Target       = 94%  Low          54-69 = <1%  Very Low  <54 = 0%     Number of Low Events (14 days)  #  of times BG < 70mg/dL = 0       Describe any trends/patterns:        Any episodes of hypoglycemia? No, pt denies .  Did patient treat episode of hypoglycemia appropriately? N/A  Does pt have proteinuria? No, NONE FOUND  If appropriate, is patient on ACEi/ARB? N/A    Secondary Prevention  Statin? No,   .  ACE-I/ARB? N/A.  Aspirin? Yes, 81mg daily .    Pertinent PMH Review:  PMH of Pancreatitis: No  PMH of Retinopathy: No  PMH of Urinary Tract Infections: Yes  PMH of MTC: No        Review of Systems    Objective     There were no vitals taken for this visit.     Labs  Lab Results   Component Value Date    BILITOT 0.8 03/28/2023    CALCIUM 8.3 (L) 03/31/2023    CO2 26 03/31/2023    CL 98 03/31/2023    CREATININE 0.75 03/31/2023    GLUCOSE 263 (H) 03/31/2023    ALKPHOS 73 03/28/2023    K 3.9 03/31/2023    PROT 8.0 03/28/2023     (L) 03/31/2023    AST 24 03/28/2023    ALT 22 03/28/2023    BUN 23 03/31/2023    ANIONGAP 14 03/31/2023    MG 2.00 06/04/2020    ALBUMIN 4.0 03/28/2023    LIPASE 55 09/29/2018    GFRF 88 03/31/2023     Lab Results   Component Value Date    TRIG 189 (H) 04/25/2022    CHOL 153 04/25/2022    HDL 44.0 04/25/2022     Lab Results   Component Value Date    HGBA1C 6.2 08/02/2023       Current Outpatient Medications on File Prior to Visit   Medication Sig Dispense Refill    ACCU-CHEK SOFTCLIX LANCETS MISC 1 Lancet  in the morning and 1 Lancet before bedtime. Use as directed to test blood sugar twice daily.      aspirin 81 mg EC tablet Take 1 tablet (81 mg) by mouth.      blood sugar  diagnostic (Accu-Chek Guide test strips) strip 1 each  in the morning and 1 each in the evening. TEST TWICE DAILY.      blood-glucose sensor (Dexcom G7 Sensor) device Place one sensor every 10 days on back of arm to check sugars. 3 each 11    celecoxib (CeleBREX) 200 mg capsule TAKE 1 CAPSULE BY MOUTH EVERY MORNING AND AT BEDTIME 180 capsule 1    cyclobenzaprine (Flexeril) 10 mg tablet Take 0.5-1 tablets (5-10 mg) by mouth as needed at bedtime for muscle spasms. TAKE 1/2 OR 1 TABLET BY MOUTH NIGHTLY AS NEEDED 180 tablet 1    docusate sodium (Colace) 100 mg capsule Take 1 capsule (100 mg) by mouth 2 times a day.      DULoxetine (Cymbalta) 60 mg DR capsule Take 1 capsule (60 mg) by mouth once daily. 90 capsule 1    estradiol (Estrace) 0.5 mg tablet TAKE 1 TABLET ONCE DAILY 90 tablet 1    hydroxychloroquine (Plaquenil) 200 mg tablet TAKE 1 TABLET EVERY MORNINGAND BEFORE BEDTIME 180 tablet 1    levothyroxine (Synthroid, Levoxyl) 50 mcg tablet Take 1 tablet (50 mcg) by mouth once daily. 90 tablet 1    magnesium oxide (Mag-Ox) 400 mg tablet Take 1 tablet (400 mg) by mouth once daily.      metFORMIN (Glucophage) 500 mg tablet TAKE 1 TABLET IN THE       MORNING AND EVENING WITH   MEALS 180 tablet 1    metoprolol succinate XL (Toprol-XL) 25 mg 24 hr tablet Take 1 tablet (25 mg) by mouth once daily. 90 tablet 1    metroNIDAZOLE (Metrogel) 1 % gel Apply 1 Application topically once daily. APPLY SPARINGLY TO AFFECTED AREA(S) ONCE DAILY      naloxone (Narcan) 4 mg/0.1 mL nasal spray Administer 1 spray (4 mg) into affected nostril(s) if needed for opioid reversal. May repeat every 2-3 minutes if needed, alternating nostrils, until medical assistance becomes available. 2 each 0    neomycin-polymyxin-HC (Cortisporin) 3.5-10,000-1 mg/mL-unit/mL-% otic suspension INSTILL 4 DROPS IN THE LEFT EAR EVERY 8 HOURS FOR 5 DAYS (Patient not taking: Reported on 11/1/2023) 60 mL 0    omeprazole (PriLOSEC) 40 mg DR capsule TAKE 1 CAPSULE EVERY        MORNING BEFORE A MEAL 90 capsule 1    potassium chloride ER (Micro-K) 10 mEq ER capsule Take 2 capsules (20 mEq) by mouth once daily. 90 capsule 1    pregabalin (Lyrica) 150 mg capsule Take 1 capsule (150 mg) by mouth in the morning and 1 capsule (150 mg) in the evening and 1 capsule (150 mg) before bedtime. 270 capsule 1    semaglutide 0.25 mg or 0.5 mg (2 mg/3 mL) pen injector Inject 0.25 mg under the skin 1 (one) time per week. 3 mL 0    SITagliptin phos-metformin (Janumet) 50-1,000 mg tablet Take 1 tablet by mouth once daily in the morning.      traMADol (Ultram) 50 mg tablet Take 1 tablet (50 mg) by mouth every 6 hours if needed for severe pain (pain scale 7-10). 120 tablet 0    triamterene-hydrochlorothiazid (Dyazide) 37.5-25 mg capsule Take 1 capsule by mouth once daily in the morning. 90 capsule 1    triamterene-hydrochlorothiazid (Maxzide-25) 37.5-25 mg tablet TAKE 1 TABLET BY MOUTH EVERY MORNING 90 tablet 1     No current facility-administered medications on file prior to visit.        Assessment/Plan   Problem List Items Addressed This Visit             ICD-10-CM    Diabetes (CMS/[facility]) E11.9     Patient identified self-management goal:  keep sugars at goal and lose weight  Patient's goal A1c is < 7%.  Is pt at goal? Yes, 6.2% on 8/2/23  Patient's SMBGs are at goal.   Rationale for plan: patient is at goal. Recently stopped Janumet and was placed on Ozempic samples by PCP. Reports she has lost a few pounds and denies side effects.     Medication Changes:  CONTINUE  Metformin 500mg twice daily    INCREASE  Ozempic to 0.5mg weekly (after 1 more dose of 0.25mg)     Monitoring and Education:  Patient was invited via email to share Yefri readings with practice account.     Diabetes Education  Rule of 15: eating ~15 g of carbs when BG less than 80 (half cup juice, 3-4 glucose tabs).  Recognize symptoms of high and low blood sugar.   Eat a realistic healthy diet consisting of fruits, vegetables, fiber, protein  food choices on a regular basis and be aware of portion/serving sizes. Reduce carbohydrate consumption and always consume with protein and fat. Avoid foods high in saturated/trans fat, high salt content, and sweets and beverages with added sugars.  Limit alcohol consumption; alcohol may affect your blood sugar and cause hypoglycemia.   Stay active and incorporate ~30 mins of exercise into your daily routine to manage your weight and increase the body's acceptance of insulin.          PATIENT GOALS   Fasting B - 130 mg/dL 2-HR Postprandial BG:   Less than 180 mg/dL A1c:   Less than 7.0 %                Relevant Orders    Follow Up In [facility] - Pharmacy       Labs ordered:  none - reminded of active lab orders     Immunizations: Not up to date - COVID #3, Tdap - chart review limited by EMR conversion     Referrals:  none     Follow-up:  at 12:30 via telephone     Time spent with pt: Total length of time 15 (minutes) of the encounter and more than 50% was spent counseling the patient.      Dorcas Barragan PharmD, ESPINOZA  Clinical Pharmacist  Pharmacy Services  [phone]    Continue all meds under the continuation of care with the referring provider and clinical pharmacy team.    Verbal consent to manage patient's drug therapy was obtained from the patient. They were informed they may decline to participate or withdraw from participation in pharmacy services at any time.

## 2023-11-20 NOTE — ASSESSMENT & PLAN NOTE
Patient identified self-management goal:  keep sugars at goal and lose weight  Patient's goal A1c is < 7%.  Is pt at goal? Yes, 6.2% on 23  Patient's SMBGs are at goal.   Rationale for plan: patient is at goal. Recently stopped Janumet and was placed on Ozempic samples by PCP. Reports she has lost a few pounds and denies side effects.     Medication Changes:  CONTINUE  Metformin 500mg twice daily    INCREASE  Ozempic to 0.5mg weekly (after 1 more dose of 0.25mg)     Monitoring and Education:  Patient was invited via email to share Yefri readings with practice account.     Diabetes Education  Rule of 15: eating ~15 g of carbs when BG less than 80 (half cup juice, 3-4 glucose tabs).  Recognize symptoms of high and low blood sugar.   Eat a realistic healthy diet consisting of fruits, vegetables, fiber, protein food choices on a regular basis and be aware of portion/serving sizes. Reduce carbohydrate consumption and always consume with protein and fat. Avoid foods high in saturated/trans fat, high salt content, and sweets and beverages with added sugars.  Limit alcohol consumption; alcohol may affect your blood sugar and cause hypoglycemia.   Stay active and incorporate ~30 mins of exercise into your daily routine to manage your weight and increase the body's acceptance of insulin.          PATIENT GOALS   Fasting B - 130 mg/dL 2-HR Postprandial BG:   Less than 180 mg/dL A1c:   Less than 7.0 %

## 2023-11-22 ENCOUNTER — PHARMACY VISIT (OUTPATIENT)
Dept: PHARMACY | Facility: CLINIC | Age: 68
End: 2023-11-22
Payer: MEDICARE

## 2023-11-22 PROCEDURE — RXMED WILLOW AMBULATORY MEDICATION CHARGE

## 2023-11-27 ENCOUNTER — PHARMACY VISIT (OUTPATIENT)
Dept: PHARMACY | Facility: CLINIC | Age: 68
End: 2023-11-27
Payer: MEDICARE

## 2023-11-27 PROCEDURE — RXMED WILLOW AMBULATORY MEDICATION CHARGE

## 2023-11-27 RX ORDER — LOPERAMIDE HCL 2 MG
4 TABLET ORAL
Qty: 24 TABLET | Refills: 0 | OUTPATIENT
Start: 2023-11-27

## 2023-11-27 RX ORDER — ONDANSETRON 4 MG/1
TABLET, ORALLY DISINTEGRATING ORAL
Qty: 15 TABLET | Refills: 0 | OUTPATIENT
Start: 2023-11-27

## 2023-11-30 ENCOUNTER — PHARMACY VISIT (OUTPATIENT)
Dept: PHARMACY | Facility: CLINIC | Age: 68
End: 2023-11-30
Payer: MEDICARE

## 2023-11-30 DIAGNOSIS — R31.9 URINARY TRACT INFECTION WITH HEMATURIA, SITE UNSPECIFIED: ICD-10-CM

## 2023-11-30 DIAGNOSIS — R39.9 UTI SYMPTOMS: ICD-10-CM

## 2023-11-30 DIAGNOSIS — N39.0 URINARY TRACT INFECTION WITH HEMATURIA, SITE UNSPECIFIED: ICD-10-CM

## 2023-11-30 PROCEDURE — RXMED WILLOW AMBULATORY MEDICATION CHARGE

## 2023-11-30 RX ORDER — CIPROFLOXACIN 250 MG/1
250 TABLET, FILM COATED ORAL 2 TIMES DAILY
Qty: 14 TABLET | Refills: 0 | Status: SHIPPED | OUTPATIENT
Start: 2023-11-30 | End: 2023-12-07

## 2023-11-30 NOTE — TELEPHONE ENCOUNTER
Patient is calling because she states she is supposed to have a refill on her Cipro at [facility]. She states that since we went to the new system, it was canceled out. She is having pain while urination and states she doesn't have time to come in for an appointment. Wanted to know if she could get her refill on the Cipro    Rx Refill Request Telephone Encounter    Name:  Marianna Buck  : 1955     Medication Name:  Cipro  Dose (Optional):    250mg  Quantity (Optional):    #14  Directions (Optional):   Take 1 tablet by mouth two times a day for 14 days    ALLERGIES:   see list     Specific Pharmacy location:  [facility]     Date of last appointment:  23  Date of next appointment:  24    Best number to reach patient:  [phone]

## 2023-12-19 DIAGNOSIS — M54.9 BACK PAIN, UNSPECIFIED BACK LOCATION, UNSPECIFIED BACK PAIN LATERALITY, UNSPECIFIED CHRONICITY: ICD-10-CM

## 2023-12-19 DIAGNOSIS — M25.562 LEFT KNEE PAIN, UNSPECIFIED CHRONICITY: ICD-10-CM

## 2023-12-20 ENCOUNTER — TELEMEDICINE (OUTPATIENT)
Dept: PHARMACY | Facility: HOSPITAL | Age: 68
End: 2023-12-20
Payer: MEDICARE

## 2023-12-20 DIAGNOSIS — E11.9 TYPE 2 DIABETES MELLITUS WITHOUT COMPLICATION, WITHOUT LONG-TERM CURRENT USE OF INSULIN (MULTI): ICD-10-CM

## 2023-12-20 PROCEDURE — RXMED WILLOW AMBULATORY MEDICATION CHARGE

## 2023-12-20 RX ORDER — TRAMADOL HYDROCHLORIDE 50 MG/1
50 TABLET ORAL EVERY 6 HOURS PRN
Qty: 120 TABLET | Refills: 0 | Status: SHIPPED | OUTPATIENT
Start: 2023-12-20 | End: 2024-01-22 | Stop reason: SDUPTHER

## 2023-12-20 NOTE — PROGRESS NOTES
Patient ID: Marianna Buck is a 68 y.o. female who presents for No chief complaint on file..    Referring Provider: Tom Bingham DO   Pt is here for follow-up appointment. Last visit with PCP: 11.1.23    Verbal consent to manage patient's drug therapy was obtained from patient. They were informed they may decline to participate or withdraw from participation in pharmacy services at any time. Patient is agreeable to detailed voice messages if unable to be reached.       Subjective   Treatment Adherence:   Patient did take medications today.   Number of missed doses in last 7 days: 0.  Why? 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 pepper      HPI  DIABETES MELLITUS TYPE 2:  Diagnosed with diabetes:  >1 yr. Known diabetic complications: none. Optometry exam current (within 1year):  No, not found in chart review  Most recent visit in Podiatry was on - not found in chart review     Current diabetic medications include:  Metformin 500mg twice daily  Ozempic 0.5mg weekly (samples from PCP)    Patient confirms above regimen. Still taking 0.25mg dose     Home blood glucose records (mg/dL)  DEXCOM  G7  Sensor Capture   10 day capture rate = 79%   Average Blood Sugars  14 day average = 140   Time in target (14 days)  Very High >250 = 0%  High         181-250 = 8%  Target       = 92%  Low          54-69 = 0%  Very Low  <54 = 0%   Number of Low Events (14 days)  #  of times BG < 70mg/dL = 0           Any episodes of hypoglycemia? No, pt denies .  Did patient treat episode of hypoglycemia appropriately? N/A  Does pt have proteinuria? No, NONE FOUND  If appropriate, is patient on ACEi/ARB? N/A    Secondary Prevention  Statin? No,   .  ACE-I/ARB? N/A.  Aspirin? Yes, 81mg daily .    Pertinent PMH Review:  PMH of Pancreatitis: No  PMH of Retinopathy: No  PMH of Urinary Tract Infections: Yes  PMH of MTC: No        Review of Systems    Objective     There were no vitals taken for this visit.     Labs  Lab Results   Component Value Date    BILITOT 0.8 03/28/2023    CALCIUM 8.3 (L) 03/31/2023    CO2 26 03/31/2023    CL 98 03/31/2023    CREATININE 0.75 03/31/2023    GLUCOSE 263 (H) 03/31/2023    ALKPHOS 73 03/28/2023    K 3.9 03/31/2023    PROT 8.0 03/28/2023     (L) 03/31/2023    AST 24 03/28/2023    ALT 22 03/28/2023    BUN 23 03/31/2023    ANIONGAP 14 03/31/2023    MG 2.00 06/04/2020    ALBUMIN 4.0 03/28/2023    LIPASE 55 09/29/2018    GFRF 88 03/31/2023     Lab Results   Component Value Date    TRIG 189 (H) 04/25/2022    CHOL 153 04/25/2022    HDL 44.0 04/25/2022     Lab Results   Component Value Date    HGBA1C 6.2 08/02/2023       Current Outpatient Medications   Medication Instructions    ACCU-CHEK SOFTCLIX LANCETS MISC 1 Lancet, miscellaneous, 2 times daily, Use as directed to test blood sugar twice daily    aspirin 81 mg, oral    blood sugar diagnostic (Accu-Chek Guide test strips) strip 1 each, miscellaneous, 2 times daily, TEST TWICE DAILY    blood-glucose sensor (Dexcom G7 Sensor) device Place one sensor every 10 days on back of arm to check sugars.    celecoxib (CeleBREX) 200 mg capsule TAKE 1  CAPSULE BY MOUTH EVERY MORNING AND AT BEDTIME    cyclobenzaprine (FLEXERIL) 5-10 mg, oral, Nightly PRN, TAKE 1/2 OR 1 TABLET BY MOUTH NIGHTLY AS NEEDED    docusate sodium (COLACE) 100 mg, oral, 2 times daily    DULoxetine (CYMBALTA) 60 mg, oral, Daily    estradiol (ESTRACE) 0.5 mg, oral, Daily    hydroxychloroquine (Plaquenil) 200 mg tablet TAKE 1 TABLET EVERY MORNINGAND BEFORE BEDTIME    levothyroxine (SYNTHROID, LEVOXYL) 50 mcg, oral, Daily    loperamide (Anti-DiarrheaL, loperamide,) 2 mg tablet Take 2 tablets (4 mg) by mouth after 1st loose stool and 1 tablet after each subsequent bowel movement do not exceed 16 mg (8 tablets) per day    magnesium oxide (MAG-OX) 400 mg, oral, Daily    metFORMIN (Glucophage) 500 mg tablet TAKE 1 TABLET IN THE       MORNING AND EVENING WITH   MEALS    metoprolol succinate XL (TOPROL-XL) 25 mg, oral, Daily    metroNIDAZOLE (Metrogel) 1 % gel 1 Application, Topical, Daily, APPLY SPARINGLY TO AFFECTED AREA(S) ONCE DAILY    naloxone (NARCAN) 4 mg, nasal, As needed, May repeat every 2-3 minutes if needed, alternating nostrils, until medical assistance becomes available.    neomycin-polymyxin-HC (Cortisporin) 3.5-10,000-1 mg/mL-unit/mL-% otic suspension INSTILL 4 DROPS IN THE LEFT EAR EVERY 8 HOURS FOR 5 DAYS    omeprazole (PRILOSEC) 40 mg, oral, Daily before breakfast    ondansetron ODT (Zofran-ODT) 4 mg disintegrating tablet Dissolve 1 tablet (4mg) and place on the tongue where it will dissolve, and then swallow every 4-6 hours as needed for nausea/vomiting    potassium chloride ER (Micro-K) 10 mEq ER capsule 20 mEq, oral, Daily    pregabalin (Lyrica) 150 mg capsule Take 1 capsule (150 mg) by mouth in the morning and 1 capsule (150 mg) in the evening and 1 capsule (150 mg) before bedtime.    semaglutide 0.25 mg, subcutaneous, Weekly    SITagliptin phos-metformin (Janumet) 50-1,000 mg tablet 1 tablet, oral, Every morning    traMADol (Ultram) 50 mg tablet Take 1 tablet (50 mg) by mouth  every 6 hours if needed for severe pain (pain scale 7-10).    triamterene-hydrochlorothiazid (Dyazide) 37.5-25 mg capsule 1 capsule, oral, Every morning    triamterene-hydrochlorothiazid (Maxzide-25) 37.5-25 mg tablet TAKE 1 TABLET BY MOUTH EVERY MORNING         Assessment/Plan   Problem List Items Addressed This Visit             ICD-10-CM    Diabetes (CMS/[facility]) E11.9     Patient identified self-management goal:  keep sugars at goal and lose weight  Patient's goal A1c is < 7%.  Is pt at goal? Yes, 6.2% on 23  Patient's SMBGs are at goal.  Rationale for plan: patient is at goal. Recently stopped Janumet and was placed on Ozempic samples by PCP. Reports she has lost a few pounds and denies side effects. Did not increase dose of Ozempic last time.     Medication Changes:  CONTINUE  Metformin 500mg twice daily    INCREASE  Ozempic to 0.5mg weekly       Diabetes Education  Rule of 15: eating ~15 g of carbs when BG less than 80 (half cup juice, 3-4 glucose tabs).  Recognize symptoms of high and low blood sugar.   Eat a realistic healthy diet consisting of fruits, vegetables, fiber, protein food choices on a regular basis and be aware of portion/serving sizes. Reduce carbohydrate consumption and always consume with protein and fat. Avoid foods high in saturated/trans fat, high salt content, and sweets and beverages with added sugars.  Limit alcohol consumption; alcohol may affect your blood sugar and cause hypoglycemia.   Stay active and incorporate ~30 mins of exercise into your daily routine to manage your weight and increase the body's acceptance of insulin.          PATIENT GOALS   Fasting B - 130 mg/dL 2-HR Postprandial BG:   Less than 180 mg/dL A1c:   Less than 7.0 %                 Labs ordered:  none - reminded of active lab orders     Referrals:  none     Follow-up:  at 1200 via telephone     Time spent with pt: Total length of time 15 (minutes) of the encounter and more than 50% was spent  counseling the patient.      Dorcas Barragan PharmD, ESPINOZA  Clinical Pharmacist  Pharmacy Services  [phone]    Continue all meds under the continuation of care with the referring provider and clinical pharmacy team.    Verbal consent to manage patient's drug therapy was obtained from the patient. They were informed they may decline to participate or withdraw from participation in pharmacy services at any time.

## 2023-12-20 NOTE — ASSESSMENT & PLAN NOTE
Patient identified self-management goal:  keep sugars at goal and lose weight  Patient's goal A1c is < 7%.  Is pt at goal? Yes, 6.2% on 23  Patient's SMBGs are at goal.  Rationale for plan: patient is at goal. Recently stopped Janumet and was placed on Ozempic samples by PCP. Reports she has lost a few pounds and denies side effects. Did not increase dose of Ozempic last time.     Medication Changes:  CONTINUE  Metformin 500mg twice daily    INCREASE  Ozempic to 0.5mg weekly       Diabetes Education  Rule of 15: eating ~15 g of carbs when BG less than 80 (half cup juice, 3-4 glucose tabs).  Recognize symptoms of high and low blood sugar.   Eat a realistic healthy diet consisting of fruits, vegetables, fiber, protein food choices on a regular basis and be aware of portion/serving sizes. Reduce carbohydrate consumption and always consume with protein and fat. Avoid foods high in saturated/trans fat, high salt content, and sweets and beverages with added sugars.  Limit alcohol consumption; alcohol may affect your blood sugar and cause hypoglycemia.   Stay active and incorporate ~30 mins of exercise into your daily routine to manage your weight and increase the body's acceptance of insulin.          PATIENT GOALS   Fasting B - 130 mg/dL 2-HR Postprandial BG:   Less than 180 mg/dL A1c:   Less than 7.0 %

## 2023-12-21 ENCOUNTER — PHARMACY VISIT (OUTPATIENT)
Dept: PHARMACY | Facility: CLINIC | Age: 68
End: 2023-12-21
Payer: MEDICARE

## 2023-12-21 PROCEDURE — RXMED WILLOW AMBULATORY MEDICATION CHARGE

## 2023-12-22 ENCOUNTER — TELEPHONE (OUTPATIENT)
Dept: PRIMARY CARE | Facility: CLINIC | Age: 68
End: 2023-12-22
Payer: MEDICARE

## 2023-12-23 PROCEDURE — RXMED WILLOW AMBULATORY MEDICATION CHARGE

## 2024-01-02 ENCOUNTER — PHARMACY VISIT (OUTPATIENT)
Dept: PHARMACY | Facility: CLINIC | Age: 69
End: 2024-01-02
Payer: MEDICARE

## 2024-01-10 ENCOUNTER — TELEPHONE (OUTPATIENT)
Dept: CARDIOLOGY | Facility: CLINIC | Age: 69
End: 2024-01-10

## 2024-01-10 NOTE — LETTER
January 10, 2024     Marianna Buck  [id number] [address] [id number]      Dear Ms. Buck:    We are sorry that you missed your appointment with Dr. Randolph on 1/10/2024. Your health and follow-up medical care are important to us. Please call our office as soon as possible at [phone] option #3 so that we may reschedule your appointment. If you have already rescheduled your appointment, please disregard this letter.         Sincerely,        [facility] Scheduling Dept.  [phone] option #3

## 2024-01-17 ENCOUNTER — TELEMEDICINE (OUTPATIENT)
Dept: PHARMACY | Facility: HOSPITAL | Age: 69
End: 2024-01-17
Payer: MEDICARE

## 2024-01-17 DIAGNOSIS — E11.9 TYPE 2 DIABETES MELLITUS WITHOUT COMPLICATION, WITHOUT LONG-TERM CURRENT USE OF INSULIN (MULTI): ICD-10-CM

## 2024-01-17 PROCEDURE — RXMED WILLOW AMBULATORY MEDICATION CHARGE

## 2024-01-17 NOTE — ASSESSMENT & PLAN NOTE
Patient identified self-management goal:  keep sugars at goal and lose weight  Patient's goal A1c is < 7%.  Is pt at goal? Yes, 6.2% on 23  Patient's SMBGs are at goal.  Rationale for plan: patient is at goal. Recently stopped Janumet and was placed on Ozempic samples by PCP. Reports she has lost a few pounds and denies side effects. Will plan ton continue Ozempic titration while patient can afford to maximize cardiorenal protection and weight loss benefit.     Medication Changes:  CONTINUE  Metformin 500mg twice daily    INCREASE  Ozempic to 1mg weekly       Diabetes Education  Rule of 15: eating ~15 g of carbs when BG less than 80 (half cup juice, 3-4 glucose tabs).  Recognize symptoms of high and low blood sugar.   Eat a realistic healthy diet consisting of fruits, vegetables, fiber, protein food choices on a regular basis and be aware of portion/serving sizes. Reduce carbohydrate consumption and always consume with protein and fat. Avoid foods high in saturated/trans fat, high salt content, and sweets and beverages with added sugars.  Limit alcohol consumption; alcohol may affect your blood sugar and cause hypoglycemia.   Stay active and incorporate ~30 mins of exercise into your daily routine to manage your weight and increase the body's acceptance of insulin.          PATIENT GOALS   Fasting B - 130 mg/dL 2-HR Postprandial BG:   Less than 180 mg/dL A1c:   Less than 7.0 %

## 2024-01-17 NOTE — PROGRESS NOTES
Patient ID: Marianna Buck is a 68 y.o. female who presents for Diabetes.    Referring Provider: Tom Bingham DO   Pt is here for follow-up appointment. Last visit with PCP: 11.1.23    Verbal consent to manage patient's drug therapy was obtained from patient. They were informed they may decline to participate or withdraw from participation in pharmacy services at any time. Patient is agreeable to detailed voice messages if unable to be reached.       Subjective   Treatment Adherence:   Patient did take medications today.   Number of missed doses in last 7 days: 0.  Why? 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; has been more active recently preparing for a [employer] craft show  Current diet: states she has been only really having 1 meal and some snacks throughout the day  Breakfast: banana or yogurt with granola; occasionally eggs or breakfast meats  Lunch: sandwich ([employer] cheese, some lunch meats)  Dinner: pork, cobbler (only crust on top), hamburger (90/10), beef stew (with carrots and potatoes) with egg noodles, spaghetti, salads, venison  Snacks: cherry pie, animal crackers, protein bars (8g sugar), cashews, shadi charms, popcorn, pretzels, strawberries, grapes  Beverages: diet coke, water, 2-3c of coffee per day, peach tea; dr. pepper      HPI  DIABETES MELLITUS TYPE 2:  Diagnosed with diabetes:  >1 yr. Known diabetic complications: none. Optometry exam current (within 1year):  No, not found in chart review  Most recent visit in Podiatry was on - not found in chart review     Current diabetic medications include:  Metformin 500mg twice daily  Ozempic 0.5mg weekly (2nd dose yesterday)    Patient confirms above regimen.     Home blood glucose records (mg/dL)  DEXCOM G7 -  Sensor Capture   14 day capture rate = 79%    Average Blood Sugars  14 day average = 140   Time in target (14 days)  Very High >250 = 0%  High         181-250 = 8%  Target       = 92%  Low          54-69 = 0%  Very Low  <54 = 0%   Number of Low Events (14 days)  #  of times BG < 70mg/dL = 0       Any episodes of hypoglycemia? No, pt denies .  Did patient treat episode of hypoglycemia appropriately? N/A  Does pt have proteinuria? No, NONE FOUND  If appropriate, is patient on ACEi/ARB? N/A    Secondary Prevention  Statin? No,   .  ACE-I/ARB? N/A.  Aspirin? Yes, 81mg daily .    Pertinent PMH Review:  PMH of Pancreatitis: No  PMH of Retinopathy: No  PMH of Urinary Tract Infections: Yes  PMH of MTC: No         Review of Systems    Objective     There were no vitals taken for this visit.     Labs  Lab Results   Component Value Date    BILITOT 0.8 03/28/2023    CALCIUM 8.3 (L) 03/31/2023    CO2 26 03/31/2023    CL 98 03/31/2023    CREATININE 0.75 03/31/2023    GLUCOSE 263 (H) 03/31/2023    ALKPHOS 73 03/28/2023    K 3.9 03/31/2023    PROT 8.0 03/28/2023     (L) 03/31/2023    AST 24 03/28/2023    ALT 22 03/28/2023    BUN 23 03/31/2023    ANIONGAP 14 03/31/2023    MG 2.00 06/04/2020    ALBUMIN 4.0 03/28/2023    LIPASE 55 09/29/2018    GFRF 88 03/31/2023     Lab Results   Component Value Date    TRIG 189 (H) 04/25/2022    CHOL 153 04/25/2022    HDL 44.0 04/25/2022     Lab Results   Component Value Date    HGBA1C 6.2 08/02/2023       Current Outpatient Medications   Medication Instructions    ACCU-CHEK SOFTCLIX LANCETS MISC 1 Lancet, miscellaneous, 2 times daily, Use as directed to test blood sugar twice daily    aspirin 81 mg, oral    blood sugar diagnostic (Accu-Chek Guide test strips) strip 1 each, miscellaneous, 2 times daily, TEST TWICE DAILY    blood-glucose sensor (Dexcom G7 Sensor) device Place one sensor every 10 days on back of arm to check sugars.    celecoxib (CELEBREX) 200 mg, oral, 2 times daily Nitrate    cyclobenzaprine (FLEXERIL) 5-10 mg,  oral, Nightly PRN, TAKE 1/2 OR 1 TABLET BY MOUTH NIGHTLY AS NEEDED    docusate sodium (COLACE) 100 mg, oral, 2 times daily    DULoxetine (CYMBALTA) 60 mg, oral, Daily    estradiol (ESTRACE) 0.5 mg, oral, Daily    hydroxychloroquine (Plaquenil) 200 mg tablet TAKE 1 TABLET EVERY MORNINGAND BEFORE BEDTIME    levothyroxine (SYNTHROID, LEVOXYL) 50 mcg, oral, Daily    loperamide (Anti-DiarrheaL, loperamide,) 2 mg tablet Take 2 tablets (4 mg) by mouth after 1st loose stool and 1 tablet after each subsequent bowel movement do not exceed 16 mg (8 tablets) per day    magnesium oxide (MAG-OX) 400 mg, oral, Daily    metFORMIN (Glucophage) 500 mg tablet TAKE 1 TABLET IN THE       MORNING AND EVENING WITH   MEALS    metoprolol succinate XL (TOPROL-XL) 25 mg, oral, Daily    metroNIDAZOLE (Metrogel) 1 % gel 1 Application, Topical, Daily, APPLY SPARINGLY TO AFFECTED AREA(S) ONCE DAILY    naloxone (NARCAN) 4 mg, nasal, As needed, May repeat every 2-3 minutes if needed, alternating nostrils, until medical assistance becomes available.    neomycin-polymyxin-HC (Cortisporin) 3.5-10,000-1 mg/mL-unit/mL-% otic suspension INSTILL 4 DROPS IN THE LEFT EAR EVERY 8 HOURS FOR 5 DAYS    omeprazole (PRILOSEC) 40 mg, oral, Daily before breakfast    ondansetron ODT (Zofran-ODT) 4 mg disintegrating tablet Dissolve 1 tablet (4mg) and place on the tongue where it will dissolve, and then swallow every 4-6 hours as needed for nausea/vomiting    potassium chloride ER (Micro-K) 10 mEq ER capsule 20 mEq, oral, Daily    pregabalin (Lyrica) 150 mg capsule Take 1 capsule (150 mg) by mouth in the morning and 1 capsule (150 mg) in the evening and 1 capsule (150 mg) before bedtime.    semaglutide 0.25 mg, subcutaneous, Weekly    traMADol (Ultram) 50 mg tablet Take 1 tablet (50 mg) by mouth every 6 hours if needed for severe pain (pain scale 7-10).    triamterene-hydrochlorothiazid (Dyazide) 37.5-25 mg capsule 1 capsule, oral, Every morning     triamterene-hydrochlorothiazid (Maxzide-25) 37.5-25 mg tablet TAKE 1 TABLET BY MOUTH EVERY MORNING         Assessment/Plan   Problem List Items Addressed This Visit             ICD-10-CM    Diabetes (CMS/[facility]) E11.9     Patient identified self-management goal:  keep sugars at goal and lose weight  Patient's goal A1c is < 7%.  Is pt at goal? Yes, 6.2% on 23  Patient's SMBGs are at goal.  Rationale for plan: patient is at goal. Recently stopped Janumet and was placed on Ozempic samples by PCP. Reports she has lost a few pounds and denies side effects. Will plan ton continue Ozempic titration while patient can afford to maximize cardiorenal protection and weight loss benefit.     Medication Changes:  CONTINUE  Metformin 500mg twice daily    INCREASE  Ozempic to 1mg weekly       Diabetes Education  Rule of 15: eating ~15 g of carbs when BG less than 80 (half cup juice, 3-4 glucose tabs).  Recognize symptoms of high and low blood sugar.   Eat a realistic healthy diet consisting of fruits, vegetables, fiber, protein food choices on a regular basis and be aware of portion/serving sizes. Reduce carbohydrate consumption and always consume with protein and fat. Avoid foods high in saturated/trans fat, high salt content, and sweets and beverages with added sugars.  Limit alcohol consumption; alcohol may affect your blood sugar and cause hypoglycemia.   Stay active and incorporate ~30 mins of exercise into your daily routine to manage your weight and increase the body's acceptance of insulin.          PATIENT GOALS   Fasting B - 130 mg/dL 2-HR Postprandial BG:   Less than 180 mg/dL A1c:   Less than 7.0 %                   Labs ordered:  none - reminded of active lab orders     Referrals:  none     Follow-up:  at 1200 via telephone     Time spent with pt: Total length of time 15 (minutes) of the encounter and more than 50% was spent counseling the patient.      Dorcas Barragan, RaulD, ESPINOZA  Clinical  Pharmacist  Pharmacy Services  [phone]    Continue all meds under the continuation of care with the referring provider and clinical pharmacy team.    Verbal consent to manage patient's drug therapy was obtained from the patient. They were informed they may decline to participate or withdraw from participation in pharmacy services at any time.

## 2024-01-22 DIAGNOSIS — M54.9 BACK PAIN, UNSPECIFIED BACK LOCATION, UNSPECIFIED BACK PAIN LATERALITY, UNSPECIFIED CHRONICITY: ICD-10-CM

## 2024-01-22 DIAGNOSIS — M25.562 LEFT KNEE PAIN, UNSPECIFIED CHRONICITY: ICD-10-CM

## 2024-01-22 PROCEDURE — RXMED WILLOW AMBULATORY MEDICATION CHARGE

## 2024-01-22 RX ORDER — TRAMADOL HYDROCHLORIDE 50 MG/1
50 TABLET ORAL EVERY 6 HOURS PRN
Qty: 44 TABLET | Refills: 0 | Status: SHIPPED | OUTPATIENT
Start: 2024-01-22 | End: 2024-01-31 | Stop reason: SDUPTHER

## 2024-01-23 ENCOUNTER — PHARMACY VISIT (OUTPATIENT)
Dept: PHARMACY | Facility: CLINIC | Age: 69
End: 2024-01-23
Payer: MEDICARE

## 2024-01-30 PROBLEM — Z00.00 MEDICARE ANNUAL WELLNESS VISIT, SUBSEQUENT: Status: ACTIVE | Noted: 2024-01-30

## 2024-01-30 NOTE — PROGRESS NOTES
Subjective   Patient ID: Marianna Buck is a 68 y.o. female who presents for Medicare Annual Wellness Visit Subsequent, Back Pain, Discuss Medications, and Shoulder Pain.    HPI    Medicare AWV    Back pain  follow up  Taking the Tramadol   Working well    70% effective   Denies any side effects   OAR[place] reviewed today   CSA 8/2/23  UDS 8-2-23    Pt still has BW in the system from 4/12/23- Reminded to do.   She has had her eyes checked.     Does not take her potassium. States it makes her nauseated.    States her Maxzide was decreased to 37.5 MG. States she doesn't know why it was decreased. States she's supposed  be on 75 MG, and her feet swell with the lower dose.    Would like to discuss getting an injection, or Prednisone for her shoulder pain.  Will need to see a specialist for at this time as I will have x-ray in the office see my note about autistic girl is just just too hard here she just and his staff changes every 8 hours so it does not want to tell me a story      No other concern /question        Review of systems  ; Patient seen today for exam denies any problems with headaches or vision, denies any shortness of breath chest pain nausea or vomiting, no black stool no blood in the stool no heartburn type symptoms denies any problems with constipation or diarrhea, and no dysuria-type symptoms we will get liquids as is the staff and  I am just this last year mom mom was unions which time she can lose her job because she was not in school after school now  The patient's allergies medications were reviewed with them today    The patient's social family and surgical history or also reviewed here today, along with her past medical history.     Objective     Alert and active in  no acute distress  HEENT TMs clear oropharynx negative nares clear no drainage noted neck supple  With no adenopathy   Heart regular rate and rhythm without murmur and no carotid bruits  Lungs- clear to auscultation bilaterally, no  "wheeze or rhonchi noted  Thyroid -negative masses or nodularity  Abdomen- soft times four quadrants , bowel sounds positive no masses or organomegaly, negative tenderness guarding or rebound  Neurological exam unremarkable- DTRs in upper and lower extremities within normal limits.   skin -no lesions noted      /74   Pulse 77   Temp 36.6 °C (97.8 °F)   Resp 16   Ht 1.676 m (5' 6\")   Wt 114 kg (250 lb 6.4 oz)   SpO2 98%   BMI 40.42 kg/m²     Allergies   Allergen Reactions    Oxycodone-Acetaminophen Other    Sulfa (Sulfonamide Antibiotics) Other     Adverse reaction    Codeine Rash       Assessment/Plan   Problem List Items Addressed This Visit       Chronic back pain    Class 3 severe obesity due to excess calories with serious comorbidity and body mass index (BMI) of 40.0 to 44.9 in adult (CMS/[facility])    Diabetes (CMS/[facility])    Relevant Medications    semaglutide (OZEMPIC) 1 mg/dose (4 mg/3 mL) pen injector    blood-glucose sensor (Dexcom G7 Sensor) device    Other Relevant Orders    Albumin , Urine Random    Hemoglobin A1C    HTN (hypertension)    Relevant Orders    CBC and Auto Differential    Comprehensive Metabolic Panel    Lipid Panel    Fibromyalgia    Relevant Medications    pregabalin (Lyrica) 150 mg capsule    Hyperglycemia    Hypothyroidism    Relevant Orders    Thyroxine, Free    Thyroid Stimulating Hormone    GERD (gastroesophageal reflux disease)    Osteoarthritis of knees, bilateral    Rheumatoid arthritis (CMS/[facility])    Relevant Medications    hydroxychloroquine (Plaquenil) 200 mg tablet    Shortness of breath    Vitamin D deficiency    BMI 40.0-44.9, adult (CMS/[facility])    Medicare annual wellness visit, subsequent - Primary     Other Visit Diagnoses       Back pain, unspecified back location, unspecified back pain laterality, unspecified chronicity        Relevant Medications    traMADol (Ultram) 50 mg tablet    Left knee pain, unspecified chronicity        Relevant Medications    traMADol (Ultram) " 50 mg tablet    Chronic pain of both shoulders        Post-menopausal        Relevant Orders    XR DEXA bone density    Vitamin B12 deficiency        Relevant Orders    Vitamin B12          Medicare wellness questionnaire reviewed in detail. Advanced Directives reviewed today. Patient advised to provide the office with a copy if has not already done so. No problems with activities of daily living. Falls risks reviewed.    Discussed patient's BMI and to institute calorie reduction and increase exercise to decrease risk of diabetes and heart disease in the future.    I personally reviewed the OARRS report for this patient. I have considered the risks of abuse, dependence, addiction, and diversion.  CSA 8/2/23, 1/31/24  UDS 8/2/23    Refill Plaquenil, Dexcom sensor, Pregabalin, Ozempic, Tramadol.    DEXA ordered.     Labs have been ordered, she/he will have these performed and we will contact her/him with results.  (CBC, CMP, Lipid, A1C, TSH, T4, Albumin, Vitamin B12)    Follow up in 3 months or sooner if necessary.    If anything worsens or changes please call us at once, follow up in the office as planned.    Scribe Attestation  By signing my name below, I, Jennifer Bland MA, Scribe   attest that this documentation has been prepared under the direction and in the presence of Tom Bingham DO.

## 2024-01-31 ENCOUNTER — OFFICE VISIT (OUTPATIENT)
Dept: PRIMARY CARE | Facility: CLINIC | Age: 69
End: 2024-01-31
Payer: MEDICARE

## 2024-01-31 ENCOUNTER — PHARMACY VISIT (OUTPATIENT)
Dept: PHARMACY | Facility: CLINIC | Age: 69
End: 2024-01-31
Payer: MEDICARE

## 2024-01-31 ENCOUNTER — LAB (OUTPATIENT)
Dept: LAB | Facility: LAB | Age: 69
End: 2024-01-31
Payer: MEDICARE

## 2024-01-31 VITALS
OXYGEN SATURATION: 98 % | DIASTOLIC BLOOD PRESSURE: 74 MMHG | HEART RATE: 77 BPM | TEMPERATURE: 97.8 F | HEIGHT: 66 IN | WEIGHT: 250.4 LBS | BODY MASS INDEX: 40.24 KG/M2 | RESPIRATION RATE: 16 BRPM | SYSTOLIC BLOOD PRESSURE: 116 MMHG

## 2024-01-31 DIAGNOSIS — M06.9 RHEUMATOID ARTHRITIS, INVOLVING UNSPECIFIED SITE, UNSPECIFIED WHETHER RHEUMATOID FACTOR PRESENT (MULTI): ICD-10-CM

## 2024-01-31 DIAGNOSIS — E11.9 TYPE 2 DIABETES MELLITUS WITHOUT COMPLICATION, WITHOUT LONG-TERM CURRENT USE OF INSULIN (MULTI): ICD-10-CM

## 2024-01-31 DIAGNOSIS — I10 PRIMARY HYPERTENSION: ICD-10-CM

## 2024-01-31 DIAGNOSIS — Z00.00 MEDICARE ANNUAL WELLNESS VISIT, SUBSEQUENT: Primary | ICD-10-CM

## 2024-01-31 DIAGNOSIS — E66.01 CLASS 3 SEVERE OBESITY DUE TO EXCESS CALORIES WITH SERIOUS COMORBIDITY AND BODY MASS INDEX (BMI) OF 40.0 TO 44.9 IN ADULT (MULTI): ICD-10-CM

## 2024-01-31 DIAGNOSIS — M25.562 LEFT KNEE PAIN, UNSPECIFIED CHRONICITY: ICD-10-CM

## 2024-01-31 DIAGNOSIS — K21.9 GASTROESOPHAGEAL REFLUX DISEASE, UNSPECIFIED WHETHER ESOPHAGITIS PRESENT: ICD-10-CM

## 2024-01-31 DIAGNOSIS — M54.9 BACK PAIN, UNSPECIFIED BACK LOCATION, UNSPECIFIED BACK PAIN LATERALITY, UNSPECIFIED CHRONICITY: ICD-10-CM

## 2024-01-31 DIAGNOSIS — M25.512 CHRONIC PAIN OF BOTH SHOULDERS: ICD-10-CM

## 2024-01-31 DIAGNOSIS — G89.29 CHRONIC LOW BACK PAIN, UNSPECIFIED BACK PAIN LATERALITY, UNSPECIFIED WHETHER SCIATICA PRESENT: ICD-10-CM

## 2024-01-31 DIAGNOSIS — M17.0 OSTEOARTHRITIS OF BOTH KNEES, UNSPECIFIED OSTEOARTHRITIS TYPE: ICD-10-CM

## 2024-01-31 DIAGNOSIS — G89.29 CHRONIC PAIN OF BOTH SHOULDERS: ICD-10-CM

## 2024-01-31 DIAGNOSIS — R06.02 SHORTNESS OF BREATH: ICD-10-CM

## 2024-01-31 DIAGNOSIS — E53.8 VITAMIN B12 DEFICIENCY: ICD-10-CM

## 2024-01-31 DIAGNOSIS — M25.511 CHRONIC PAIN OF BOTH SHOULDERS: ICD-10-CM

## 2024-01-31 DIAGNOSIS — I10 HYPERTENSION, UNSPECIFIED TYPE: ICD-10-CM

## 2024-01-31 DIAGNOSIS — M54.50 CHRONIC LOW BACK PAIN, UNSPECIFIED BACK PAIN LATERALITY, UNSPECIFIED WHETHER SCIATICA PRESENT: ICD-10-CM

## 2024-01-31 DIAGNOSIS — E55.9 VITAMIN D DEFICIENCY: ICD-10-CM

## 2024-01-31 DIAGNOSIS — Z78.0 POST-MENOPAUSAL: ICD-10-CM

## 2024-01-31 DIAGNOSIS — R73.9 HYPERGLYCEMIA: ICD-10-CM

## 2024-01-31 DIAGNOSIS — M79.7 FIBROMYALGIA: ICD-10-CM

## 2024-01-31 DIAGNOSIS — E03.9 HYPOTHYROIDISM, UNSPECIFIED TYPE: ICD-10-CM

## 2024-01-31 LAB
ALBUMIN SERPL BCP-MCNC: 3.9 G/DL (ref 3.4–5)
ALP SERPL-CCNC: 68 U/L (ref 33–136)
ALT SERPL W P-5'-P-CCNC: 32 U/L (ref 7–45)
ANION GAP SERPL CALC-SCNC: 17 MMOL/L (ref 10–20)
AST SERPL W P-5'-P-CCNC: 33 U/L (ref 9–39)
BASOPHILS # BLD AUTO: 0.05 X10*3/UL (ref 0–0.1)
BASOPHILS NFR BLD AUTO: 1 %
BILIRUB SERPL-MCNC: 0.5 MG/DL (ref 0–1.2)
BUN SERPL-MCNC: 17 MG/DL (ref 6–23)
CALCIUM SERPL-MCNC: 9.3 MG/DL (ref 8.6–10.3)
CHLORIDE SERPL-SCNC: 98 MMOL/L (ref 98–107)
CHOLEST SERPL-MCNC: 157 MG/DL (ref 0–199)
CHOLESTEROL/HDL RATIO: 3.9
CO2 SERPL-SCNC: 29 MMOL/L (ref 21–32)
CREAT SERPL-MCNC: 0.98 MG/DL (ref 0.5–1.05)
CREAT UR-MCNC: 167.1 MG/DL (ref 20–320)
EGFRCR SERPLBLD CKD-EPI 2021: 63 ML/MIN/1.73M*2
EOSINOPHIL # BLD AUTO: 0.1 X10*3/UL (ref 0–0.7)
EOSINOPHIL NFR BLD AUTO: 2.1 %
ERYTHROCYTE [DISTWIDTH] IN BLOOD BY AUTOMATED COUNT: 15.3 % (ref 11.5–14.5)
EST. AVERAGE GLUCOSE BLD GHB EST-MCNC: 123 MG/DL
GLUCOSE SERPL-MCNC: 110 MG/DL (ref 74–99)
HBA1C MFR BLD: 5.9 %
HCT VFR BLD AUTO: 38.6 % (ref 36–46)
HDLC SERPL-MCNC: 40.6 MG/DL
HGB BLD-MCNC: 12.1 G/DL (ref 12–16)
IMM GRANULOCYTES # BLD AUTO: 0.01 X10*3/UL (ref 0–0.7)
IMM GRANULOCYTES NFR BLD AUTO: 0.2 % (ref 0–0.9)
LDLC SERPL CALC-MCNC: 64 MG/DL
LYMPHOCYTES # BLD AUTO: 1.59 X10*3/UL (ref 1.2–4.8)
LYMPHOCYTES NFR BLD AUTO: 32.6 %
MCH RBC QN AUTO: 25.2 PG (ref 26–34)
MCHC RBC AUTO-ENTMCNC: 31.3 G/DL (ref 32–36)
MCV RBC AUTO: 80 FL (ref 80–100)
MICROALBUMIN UR-MCNC: <7 MG/L
MICROALBUMIN/CREAT UR: NORMAL MG/G{CREAT}
MONOCYTES # BLD AUTO: 0.55 X10*3/UL (ref 0.1–1)
MONOCYTES NFR BLD AUTO: 11.3 %
NEUTROPHILS # BLD AUTO: 2.57 X10*3/UL (ref 1.2–7.7)
NEUTROPHILS NFR BLD AUTO: 52.8 %
NON HDL CHOLESTEROL: 116 MG/DL (ref 0–149)
NRBC BLD-RTO: 0 /100 WBCS (ref 0–0)
PLATELET # BLD AUTO: 183 X10*3/UL (ref 150–450)
POTASSIUM SERPL-SCNC: 4.6 MMOL/L (ref 3.5–5.3)
PROT SERPL-MCNC: 7.4 G/DL (ref 6.4–8.2)
RBC # BLD AUTO: 4.81 X10*6/UL (ref 4–5.2)
SODIUM SERPL-SCNC: 139 MMOL/L (ref 136–145)
T4 FREE SERPL-MCNC: 0.8 NG/DL (ref 0.61–1.12)
TRIGL SERPL-MCNC: 264 MG/DL (ref 0–149)
TSH SERPL-ACNC: 1.9 MIU/L (ref 0.44–3.98)
VIT B12 SERPL-MCNC: 313 PG/ML (ref 211–911)
VLDL: 53 MG/DL (ref 0–40)
WBC # BLD AUTO: 4.9 X10*3/UL (ref 4.4–11.3)

## 2024-01-31 PROCEDURE — 82607 VITAMIN B-12: CPT

## 2024-01-31 PROCEDURE — 85025 COMPLETE CBC W/AUTO DIFF WBC: CPT

## 2024-01-31 PROCEDURE — 82043 UR ALBUMIN QUANTITATIVE: CPT

## 2024-01-31 PROCEDURE — 1159F MED LIST DOCD IN RCRD: CPT | Performed by: FAMILY MEDICINE

## 2024-01-31 PROCEDURE — 1125F AMNT PAIN NOTED PAIN PRSNT: CPT | Performed by: FAMILY MEDICINE

## 2024-01-31 PROCEDURE — RXMED WILLOW AMBULATORY MEDICATION CHARGE

## 2024-01-31 PROCEDURE — 84439 ASSAY OF FREE THYROXINE: CPT

## 2024-01-31 PROCEDURE — 83036 HEMOGLOBIN GLYCOSYLATED A1C: CPT

## 2024-01-31 PROCEDURE — 1170F FXNL STATUS ASSESSED: CPT | Performed by: FAMILY MEDICINE

## 2024-01-31 PROCEDURE — 3078F DIAST BP <80 MM HG: CPT | Performed by: FAMILY MEDICINE

## 2024-01-31 PROCEDURE — 80053 COMPREHEN METABOLIC PANEL: CPT

## 2024-01-31 PROCEDURE — 36415 COLL VENOUS BLD VENIPUNCTURE: CPT

## 2024-01-31 PROCEDURE — 99213 OFFICE O/P EST LOW 20 MIN: CPT | Performed by: FAMILY MEDICINE

## 2024-01-31 PROCEDURE — 82570 ASSAY OF URINE CREATININE: CPT

## 2024-01-31 PROCEDURE — 1036F TOBACCO NON-USER: CPT | Performed by: FAMILY MEDICINE

## 2024-01-31 PROCEDURE — G0439 PPPS, SUBSEQ VISIT: HCPCS | Performed by: FAMILY MEDICINE

## 2024-01-31 PROCEDURE — 3008F BODY MASS INDEX DOCD: CPT | Performed by: FAMILY MEDICINE

## 2024-01-31 PROCEDURE — 80061 LIPID PANEL: CPT

## 2024-01-31 PROCEDURE — 84443 ASSAY THYROID STIM HORMONE: CPT

## 2024-01-31 PROCEDURE — 3074F SYST BP LT 130 MM HG: CPT | Performed by: FAMILY MEDICINE

## 2024-01-31 RX ORDER — TRAMADOL HYDROCHLORIDE 50 MG/1
50 TABLET ORAL EVERY 6 HOURS PRN
Qty: 120 TABLET | Refills: 2 | Status: SHIPPED | OUTPATIENT
Start: 2024-01-31 | End: 2024-05-01 | Stop reason: SDUPTHER

## 2024-01-31 RX ORDER — PREGABALIN 150 MG/1
150 CAPSULE ORAL 3 TIMES DAILY
Qty: 270 CAPSULE | Refills: 1 | Status: SHIPPED | OUTPATIENT
Start: 2024-01-31

## 2024-01-31 RX ORDER — HYDROXYCHLOROQUINE SULFATE 200 MG/1
200 TABLET, FILM COATED ORAL
Qty: 180 TABLET | Refills: 1 | Status: SHIPPED | OUTPATIENT
Start: 2024-01-31

## 2024-01-31 RX ORDER — BLOOD-GLUCOSE SENSOR
EACH MISCELLANEOUS
Qty: 3 EACH | Refills: 11 | Status: SHIPPED | OUTPATIENT
Start: 2024-01-31

## 2024-01-31 ASSESSMENT — ACTIVITIES OF DAILY LIVING (ADL)
MANAGING_FINANCES: INDEPENDENT
DRESSING: INDEPENDENT
BATHING: INDEPENDENT
DOING_HOUSEWORK: INDEPENDENT
GROCERY_SHOPPING: INDEPENDENT
TAKING_MEDICATION: INDEPENDENT

## 2024-01-31 ASSESSMENT — PATIENT HEALTH QUESTIONNAIRE - PHQ9
2. FEELING DOWN, DEPRESSED OR HOPELESS: NOT AT ALL
SUM OF ALL RESPONSES TO PHQ9 QUESTIONS 1 AND 2: 0
1. LITTLE INTEREST OR PLEASURE IN DOING THINGS: NOT AT ALL

## 2024-02-01 ENCOUNTER — TELEPHONE (OUTPATIENT)
Dept: PRIMARY CARE | Facility: CLINIC | Age: 69
End: 2024-02-01
Payer: MEDICARE

## 2024-02-01 NOTE — TELEPHONE ENCOUNTER
----- Message from Tom Bingham DO sent at 1/31/2024  6:56 PM EST -----  Reviewed Marianna's labs sugars are all excellent best they have ever been she is doing great job continue all the other labs are normal kidneys sodium potassium, she needs to increase her B12 little bit 5000 mcg underneath her tongue see us back in 6 months or sooner if needed

## 2024-02-02 ENCOUNTER — APPOINTMENT (OUTPATIENT)
Dept: PRIMARY CARE | Facility: CLINIC | Age: 69
End: 2024-02-02
Payer: MEDICARE

## 2024-02-05 DIAGNOSIS — N30.00 ACUTE CYSTITIS WITHOUT HEMATURIA: Primary | ICD-10-CM

## 2024-02-05 PROCEDURE — RXMED WILLOW AMBULATORY MEDICATION CHARGE

## 2024-02-05 RX ORDER — TRIAMTERENE/HYDROCHLOROTHIAZID 37.5-25 MG
1 TABLET ORAL
Qty: 90 TABLET | Refills: 1 | Status: SHIPPED | OUTPATIENT
Start: 2024-02-05 | End: 2025-02-04

## 2024-02-07 PROCEDURE — RXMED WILLOW AMBULATORY MEDICATION CHARGE

## 2024-02-09 ENCOUNTER — TELEMEDICINE (OUTPATIENT)
Dept: PHARMACY | Facility: HOSPITAL | Age: 69
End: 2024-02-09
Payer: MEDICARE

## 2024-02-09 DIAGNOSIS — E11.9 TYPE 2 DIABETES MELLITUS WITHOUT COMPLICATION, WITHOUT LONG-TERM CURRENT USE OF INSULIN (MULTI): ICD-10-CM

## 2024-02-09 NOTE — ASSESSMENT & PLAN NOTE
Patient identified self-management goal:  keep sugars at goal and lose weight  Patient's goal A1c is < 7%.  Is pt at goal? Yes, 5.9% on 1/31/y24  Patient's SMBGs are at goal.  Rationale for plan: Patient A1c well below goal, tolerating 1mg Ozempic well. Would like to trial off metformin.      Medication Changes:  CONTINUE  Ozempic to 1mg weekly  STOP:  Metformin 500mg twice daily       Diabetes Education  Rule of 15: eating ~15 g of carbs when BG less than 80 (half cup juice, 3-4 glucose tabs).  Recognize symptoms of high and low blood sugar.   Eat a realistic healthy diet consisting of fruits, vegetables, fiber, protein food choices on a regular basis and be aware of portion/serving sizes. Reduce carbohydrate consumption and always consume with protein and fat. Avoid foods high in saturated/trans fat, high salt content, and sweets and beverages with added sugars.  Limit alcohol consumption; alcohol may affect your blood sugar and cause hypoglycemia.   Stay active and incorporate ~30 mins of exercise into your daily routine to manage your weight and increase the body's acceptance of insulin.

## 2024-02-09 NOTE — PROGRESS NOTES
Patient ID: Marianna Buck is a 68 y.o. female who presents for Diabetes.    Referring Provider: Tom Bingham DO   Pt is here for follow-up appointment. Last visit with PCP: 1.31.24    Verbal consent to manage patient's drug therapy was obtained from patient. They were informed they may decline to participate or withdraw from participation in pharmacy services at any time. Patient is agreeable to detailed voice messages if unable to be reached.       Subjective   Treatment Adherence:   Patient did take medications today.   Number of missed doses in last 7 days: 0.  Why? 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 pepper      HPI  DIABETES MELLITUS TYPE 2:  Diagnosed with diabetes:  >1 yr. Known diabetic complications: none. Optometry exam current (within 1year):  No, not found in chart review  Most recent visit in Podiatry was on - not found in chart review     Current diabetic medications include:  Metformin 500mg twice daily  Ozempic 1mg weekly    Patient confirms above regimen.     Home blood glucose records (mg/dL)  DEXCOM G7 -  Sensor Capture   14 day capture rate = N/A   Average Blood  Sugars  14 day average = 122   Time in target (14 days)  Very High >250 = 0%  High         181-250 = 3%  Target       = 96%  Low          54-69 = 1%  Very Low  <54 = 0%   Number of Low Events (14 days)  #  of times BG < 70mg/dL = 1       Any episodes of hypoglycemia? Yes, 1 episode .  Did patient treat episode of hypoglycemia appropriately? N/A  Does pt have proteinuria? No, NONE FOUND  If appropriate, is patient on ACEi/ARB? N/A    Secondary Prevention  Statin? No,   .  ACE-I/ARB? N/A.  Aspirin? Yes, 81mg daily .    Pertinent PMH Review:  PMH of Pancreatitis: No  PMH of Retinopathy: No  PMH of Urinary Tract Infections: Yes  PMH of MTC: No      Review of Systems    Objective     There were no vitals taken for this visit.     Labs  Lab Results   Component Value Date    BILITOT 0.5 01/31/2024    CALCIUM 9.3 01/31/2024    CO2 29 01/31/2024    CL 98 01/31/2024    CREATININE 0.98 01/31/2024    GLUCOSE 110 (H) 01/31/2024    ALKPHOS 68 01/31/2024    K 4.6 01/31/2024    PROT 7.4 01/31/2024     01/31/2024    AST 33 01/31/2024    ALT 32 01/31/2024    BUN 17 01/31/2024    ANIONGAP 17 01/31/2024    MG 2.00 06/04/2020    ALBUMIN 3.9 01/31/2024    LIPASE 55 09/29/2018    GFRF 88 03/31/2023     Lab Results   Component Value Date    TRIG 264 (H) 01/31/2024    CHOL 157 01/31/2024    LDLCALC 64 01/31/2024    HDL 40.6 01/31/2024     Lab Results   Component Value Date    HGBA1C 5.9 (H) 01/31/2024       Current Outpatient Medications   Medication Instructions    ACCU-CHEK SOFTCLIX LANCETS MISC 1 Lancet, miscellaneous, 2 times daily, Use as directed to test blood sugar twice daily    aspirin 81 mg, oral    blood sugar diagnostic (Accu-Chek Guide test strips) strip 1 each, miscellaneous, 2 times daily, TEST TWICE DAILY    blood-glucose sensor (Dexcom G7 Sensor) device Place one sensor every 10 days on back of arm to check sugars.    celecoxib (CELEBREX) 200 mg, oral, 2 times daily Nitrate    cyclobenzaprine (FLEXERIL) 5-10 mg,  oral, Nightly PRN, TAKE 1/2 OR 1 TABLET BY MOUTH NIGHTLY AS NEEDED    docusate sodium (COLACE) 100 mg, oral, 2 times daily    DULoxetine (CYMBALTA) 60 mg, oral, Daily    estradiol (ESTRACE) 0.5 mg, oral, Daily    hydroxychloroquine (Plaquenil) 200 mg tablet Take 1 tablet (200 mg) by mouth in the morning and in the evening.    levothyroxine (SYNTHROID, LEVOXYL) 50 mcg, oral, Daily    loperamide (Anti-DiarrheaL, loperamide,) 2 mg tablet Take 2 tablets (4 mg) by mouth after 1st loose stool and 1 tablet after each subsequent bowel movement do not exceed 16 mg (8 tablets) per day    magnesium oxide (MAG-OX) 400 mg, oral, Daily    metFORMIN (Glucophage) 500 mg tablet TAKE 1 TABLET IN THE       MORNING AND EVENING WITH   MEALS    metoprolol succinate XL (TOPROL-XL) 25 mg, oral, Daily    metroNIDAZOLE (Metrogel) 1 % gel 1 Application, Topical, Daily, APPLY SPARINGLY TO AFFECTED AREA(S) ONCE DAILY    naloxone (NARCAN) 4 mg, nasal, As needed, May repeat every 2-3 minutes if needed, alternating nostrils, until medical assistance becomes available.    neomycin-polymyxin-HC (Cortisporin) 3.5-10,000-1 mg/mL-unit/mL-% otic suspension INSTILL 4 DROPS IN THE LEFT EAR EVERY 8 HOURS FOR 5 DAYS    omeprazole (PRILOSEC) 40 mg, oral, Daily before breakfast    ondansetron ODT (Zofran-ODT) 4 mg disintegrating tablet Dissolve 1 tablet (4mg) and place on the tongue where it will dissolve, and then swallow every 4-6 hours as needed for nausea/vomiting    potassium chloride ER (Micro-K) 10 mEq ER capsule 20 mEq, oral, Daily    pregabalin (Lyrica) 150 mg capsule Take 1 capsule (150 mg) by mouth 3 times a day. (In the moring, evening and before bed)    semaglutide (OZEMPIC) 1 mg, subcutaneous, Weekly    traMADol (Ultram) 50 mg tablet Take 1 tablet (50 mg) by mouth every 6 hours if needed for severe pain (pain scale 7-10).    triamterene-hydrochlorothiazid (Dyazide) 37.5-25 mg capsule 1 capsule, oral, Every morning     triamterene-hydrochlorothiazid (Maxzide-25) 37.5-25 mg tablet TAKE 1 TABLET BY MOUTH EVERY MORNING         Assessment/Plan   Problem List Items Addressed This Visit             ICD-10-CM    Diabetes (CMS/[facility]) E11.9     Patient identified self-management goal:  keep sugars at goal and lose weight  Patient's goal A1c is < 7%.  Is pt at goal? Yes, 5.9% on 1/31/y24  Patient's SMBGs are at goal.  Rationale for plan: Patient A1c well below goal, tolerating 1mg Ozempic well. Would like to trial off metformin.      Medication Changes:  CONTINUE  Ozempic to 1mg weekly  STOP:  Metformin 500mg twice daily       Diabetes Education  Rule of 15: eating ~15 g of carbs when BG less than 80 (half cup juice, 3-4 glucose tabs).  Recognize symptoms of high and low blood sugar.   Eat a realistic healthy diet consisting of fruits, vegetables, fiber, protein food choices on a regular basis and be aware of portion/serving sizes. Reduce carbohydrate consumption and always consume with protein and fat. Avoid foods high in saturated/trans fat, high salt content, and sweets and beverages with added sugars.  Limit alcohol consumption; alcohol may affect your blood sugar and cause hypoglycemia.   Stay active and incorporate ~30 mins of exercise into your daily routine to manage your weight and increase the body's acceptance of insulin.               Labs ordered:  none - reminded of active lab orders     Referrals:  none     Follow-up: February 28 at 11AM via telephone     Time spent with pt: Total length of time 15 (minutes) of the encounter and more than 50% was spent counseling the patient.      Dorcas Barragan, [place], ESPINOZA  Clinical Pharmacist  Pharmacy Services  [phone]    Continue all meds under the continuation of care with the referring provider and clinical pharmacy team.    Verbal consent to manage patient's drug therapy was obtained from the patient. They were informed they may decline to participate or withdraw from  participation in pharmacy services at any time.

## 2024-02-12 ENCOUNTER — HOSPITAL ENCOUNTER (OUTPATIENT)
Dept: RADIOLOGY | Facility: HOSPITAL | Age: 69
Discharge: HOME | End: 2024-02-12
Payer: MEDICARE

## 2024-02-12 DIAGNOSIS — Z12.31 ENCOUNTER FOR SCREENING MAMMOGRAM FOR MALIGNANT NEOPLASM OF BREAST: ICD-10-CM

## 2024-02-12 PROCEDURE — 77067 SCR MAMMO BI INCL CAD: CPT | Mod: BILATERAL PROCEDURE | Performed by: RADIOLOGY

## 2024-02-12 PROCEDURE — 77063 BREAST TOMOSYNTHESIS BI: CPT | Mod: BILATERAL PROCEDURE | Performed by: RADIOLOGY

## 2024-02-12 PROCEDURE — RXMED WILLOW AMBULATORY MEDICATION CHARGE

## 2024-02-12 PROCEDURE — 77067 SCR MAMMO BI INCL CAD: CPT

## 2024-02-16 ENCOUNTER — TELEPHONE (OUTPATIENT)
Dept: PRIMARY CARE | Facility: CLINIC | Age: 69
End: 2024-02-16
Payer: MEDICARE

## 2024-02-16 NOTE — TELEPHONE ENCOUNTER
----- Message from Santosh Chinchilla MD sent at 2/15/2024  8:33 PM EST -----  Normal mammogram repeat in 1 year

## 2024-02-17 ENCOUNTER — PHARMACY VISIT (OUTPATIENT)
Dept: PHARMACY | Facility: CLINIC | Age: 69
End: 2024-02-17
Payer: MEDICARE

## 2024-02-23 DIAGNOSIS — I10 HYPERTENSION, UNSPECIFIED TYPE: ICD-10-CM

## 2024-02-23 DIAGNOSIS — K21.9 GASTROESOPHAGEAL REFLUX DISEASE, UNSPECIFIED WHETHER ESOPHAGITIS PRESENT: ICD-10-CM

## 2024-02-23 PROCEDURE — RXMED WILLOW AMBULATORY MEDICATION CHARGE

## 2024-02-23 RX ORDER — OMEPRAZOLE 40 MG/1
40 CAPSULE, DELAYED RELEASE ORAL
Qty: 90 CAPSULE | Refills: 0 | Status: SHIPPED | OUTPATIENT
Start: 2024-02-23

## 2024-02-23 RX ORDER — METOPROLOL SUCCINATE 25 MG/1
25 TABLET, EXTENDED RELEASE ORAL DAILY
Qty: 90 TABLET | Refills: 0 | Status: SHIPPED | OUTPATIENT
Start: 2024-02-23 | End: 2024-05-28 | Stop reason: SDUPTHER

## 2024-02-23 NOTE — TELEPHONE ENCOUNTER
Rx Refill Request Telephone Encounter    Name:  Marianna Buck  : 1955     Medication Name:  METOPROLOL  Dose (Optional):    25 MG  Quantity (Optional):    90   Directions (Optional):   TAKE 1 TABLET BY MOUTH ONCE DAILY     ALLERGIES:   ON FILE     Specific Pharmacy location:  [facility]     Date of last appointment:  24  Date of next appointment:  24    Best number to reach patient:  [phone]      Medication Name:  OMEPRAZOLE   Dose (Optional):    40 MG   Quantity (Optional):    90  Directions (Optional):   TAKE 1 CAPSULE EVERY MORNING BEFORE A MEAL

## 2024-02-27 PROCEDURE — RXMED WILLOW AMBULATORY MEDICATION CHARGE

## 2024-02-28 ENCOUNTER — PHARMACY VISIT (OUTPATIENT)
Dept: PHARMACY | Facility: CLINIC | Age: 69
End: 2024-02-28
Payer: MEDICARE

## 2024-02-28 ENCOUNTER — TELEMEDICINE (OUTPATIENT)
Dept: PHARMACY | Facility: HOSPITAL | Age: 69
End: 2024-02-28
Payer: MEDICARE

## 2024-02-28 DIAGNOSIS — E11.9 TYPE 2 DIABETES MELLITUS WITHOUT COMPLICATION, WITHOUT LONG-TERM CURRENT USE OF INSULIN (MULTI): ICD-10-CM

## 2024-02-28 NOTE — ASSESSMENT & PLAN NOTE
Patient identified self-management goal:  keep sugars at goal and lose weight  Patient's goal A1c is < 7%.  Is pt at goal? Yes, 5.9% on 1/31/y24  Patient's SMBGs are at goal.  Rationale for plan: Patient A1c well below goal, tolerating 1mg Ozempic well. Confirms stopping metformin. Having many lows. Discussed importance of still trying to eat 3 smaller meals a day that contain carb, protein and fat to help sustain BG levels.       Medication Changes:  CONTINUE  Ozempic to 1mg weekly         Diabetes Education  Rule of 15: eating ~15 g of carbs when BG less than 80 (half cup juice, 3-4 glucose tabs).  Recognize symptoms of high and low blood sugar.   Eat a realistic healthy diet consisting of fruits, vegetables, fiber, protein food choices on a regular basis and be aware of portion/serving sizes. Reduce carbohydrate consumption and always consume with protein and fat. Avoid foods high in saturated/trans fat, high salt content, and sweets and beverages with added sugars.  Limit alcohol consumption; alcohol may affect your blood sugar and cause hypoglycemia.   Stay active and incorporate ~30 mins of exercise into your daily routine to manage your weight and increase the body's acceptance of insulin.

## 2024-02-28 NOTE — PROGRESS NOTES
Patient ID: Marianna Buck is a 68 y.o. female who presents for Diabetes.    Referring Provider: Tom Bingham DO   Pt is here for follow-up appointment. Last visit with PCP: 1.31.24    Verbal consent to manage patient's drug therapy was obtained from patient. They were informed they may decline to participate or withdraw from participation in pharmacy services at any time. Patient is agreeable to detailed voice messages if unable to be reached.       Subjective   Treatment Adherence:   Patient did take medications today.   Number of missed doses in last 7 days: 0.  Why? 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; has been more active recently preparing for a [employer] craft show  Current diet: states she has been only really having 1 meal and some snacks throughout the day   Breakfast: banana or yogurt with granola; occasionally eggs or breakfast meats  Lunch: sandwich ([employer] cheese, some lunch meats)  Dinner: pork, cobbler (only crust on top), hamburger (90/10), beef stew (with carrots and potatoes) with egg noodles, spaghetti, salads, venison  Snacks: cherry pie, animal crackers, protein bars (8g sugar), cashews, shadi charms, popcorn, pretzels, strawberries, grapes  Beverages: diet coke, water, 2-3c of coffee per day, peach tea; dr. pepper      HPI  DIABETES MELLITUS TYPE 2:  Diagnosed with diabetes:  >1 yr. Known diabetic complications: none. Optometry exam current (within 1year):  No, not found in chart review  Most recent visit in Podiatry was on - not found in chart review     Current diabetic medications include:  Ozempic 1mg weekly    Patient confirms above regimen.     Home blood glucose records (mg/dL)  DEXCOM G7 -  Sensor Capture   14 day capture rate = 64%   Average Blood Sugars  14 day average = 121   Time in target (14 days)  Very High >250  = 0%  High         181-250 = 2%  Target       = 94%  Low          54-69 = 3%  Very Low  <54 = <1%   Number of Low Events (14 days)  #  of times BG < 70mg/dL = >10           Any episodes of hypoglycemia? Yes, several episodes .  Did patient treat episode of hypoglycemia appropriately? Yes,   Attributes lows to forgetting to eat  Does pt have proteinuria? No, NONE FOUND  If appropriate, is patient on ACEi/ARB? N/A    Secondary Prevention  Statin? No,   .  ACE-I/ARB? N/A.  Aspirin? Yes, 81mg daily .    Pertinent PMH Review:  PMH of Pancreatitis: No  PMH of Retinopathy: No  PMH of Urinary Tract Infections: Yes  PMH of MTC: No      Review of Systems    Objective     There were no vitals taken for this visit.     Labs  Lab Results   Component Value Date    BILITOT 0.5 01/31/2024    CALCIUM 9.3 01/31/2024    CO2 29 01/31/2024    CL 98 01/31/2024    CREATININE 0.98 01/31/2024    GLUCOSE 110 (H) 01/31/2024    ALKPHOS 68 01/31/2024    K 4.6 01/31/2024    PROT 7.4 01/31/2024     01/31/2024    AST 33 01/31/2024    ALT 32 01/31/2024    BUN 17 01/31/2024    ANIONGAP 17 01/31/2024    MG 2.00 06/04/2020    ALBUMIN 3.9 01/31/2024    LIPASE 55 09/29/2018    GFRF 88 03/31/2023     Lab Results   Component Value Date    TRIG 264 (H) 01/31/2024    CHOL 157 01/31/2024    LDLCALC 64 01/31/2024    HDL 40.6 01/31/2024     Lab Results   Component Value Date    HGBA1C 5.9 (H) 01/31/2024       Current Outpatient Medications   Medication Instructions    ACCU-CHEK SOFTCLIX LANCETS MISC 1 Lancet, miscellaneous, 2 times daily, Use as directed to test blood sugar twice daily    aspirin 81 mg, oral    blood sugar diagnostic (Accu-Chek Guide test strips) strip 1 each, miscellaneous, 2 times daily, TEST TWICE DAILY    blood-glucose sensor (Dexcom G7 Sensor) device Place one sensor every 10 days on back of arm to check sugars.    celecoxib (CELEBREX) 200 mg, oral, 2 times daily Nitrate    cyclobenzaprine (FLEXERIL) 5-10 mg, oral, Nightly  PRN, TAKE 1/2 OR 1 TABLET BY MOUTH NIGHTLY AS NEEDED    docusate sodium (COLACE) 100 mg, oral, 2 times daily    DULoxetine (CYMBALTA) 60 mg, oral, Daily    estradiol (ESTRACE) 0.5 mg, oral, Daily    hydroxychloroquine (Plaquenil) 200 mg tablet Take 1 tablet (200 mg) by mouth in the morning and in the evening.    levothyroxine (SYNTHROID, LEVOXYL) 50 mcg, oral, Daily    loperamide (Anti-DiarrheaL, loperamide,) 2 mg tablet Take 2 tablets (4 mg) by mouth after 1st loose stool and 1 tablet after each subsequent bowel movement do not exceed 16 mg (8 tablets) per day    magnesium oxide (MAG-OX) 400 mg, oral, Daily    metFORMIN (Glucophage) 500 mg tablet TAKE 1 TABLET IN THE       MORNING AND EVENING WITH   MEALS    metoprolol succinate XL (TOPROL-XL) 25 mg, oral, Daily    metroNIDAZOLE (Metrogel) 1 % gel 1 Application, Topical, Daily, APPLY SPARINGLY TO AFFECTED AREA(S) ONCE DAILY    naloxone (NARCAN) 4 mg, nasal, As needed, May repeat every 2-3 minutes if needed, alternating nostrils, until medical assistance becomes available.    neomycin-polymyxin-HC (Cortisporin) 3.5-10,000-1 mg/mL-unit/mL-% otic suspension INSTILL 4 DROPS IN THE LEFT EAR EVERY 8 HOURS FOR 5 DAYS    omeprazole (PRILOSEC) 40 mg, oral, Daily before breakfast    ondansetron ODT (Zofran-ODT) 4 mg disintegrating tablet Dissolve 1 tablet (4mg) and place on the tongue where it will dissolve, and then swallow every 4-6 hours as needed for nausea/vomiting    Ozempic 1 mg, subcutaneous, Weekly    potassium chloride ER (Micro-K) 10 mEq ER capsule 20 mEq, oral, Daily    pregabalin (Lyrica) 150 mg capsule Take 1 capsule (150 mg) by mouth 3 times a day. (In the moring, evening and before bed)    traMADol (Ultram) 50 mg tablet Take 1 tablet (50 mg) by mouth every 6 hours if needed for severe pain (pain scale 7-10).    triamterene-hydrochlorothiazid (Dyazide) 37.5-25 mg capsule 1 capsule, oral, Every morning    triamterene-hydrochlorothiazid (Maxzide-25) 37.5-25 mg  tablet TAKE 1 TABLET BY MOUTH EVERY MORNING         Assessment/Plan   Problem List Items Addressed This Visit             ICD-10-CM    Diabetes (CMS/[facility]) E11.9     Patient identified self-management goal:  keep sugars at goal and lose weight  Patient's goal A1c is < 7%.  Is pt at goal? Yes, 5.9% on 1/31/y24  Patient's SMBGs are at goal.  Rationale for plan: Patient A1c well below goal, tolerating 1mg Ozempic well. Confirms stopping metformin. Having many lows. Discussed importance of still trying to eat 3 smaller meals a day that contain carb, protein and fat to help sustain BG levels.       Medication Changes:  CONTINUE  Ozempic to 1mg weekly         Diabetes Education  Rule of 15: eating ~15 g of carbs when BG less than 80 (half cup juice, 3-4 glucose tabs).  Recognize symptoms of high and low blood sugar.   Eat a realistic healthy diet consisting of fruits, vegetables, fiber, protein food choices on a regular basis and be aware of portion/serving sizes. Reduce carbohydrate consumption and always consume with protein and fat. Avoid foods high in saturated/trans fat, high salt content, and sweets and beverages with added sugars.  Limit alcohol consumption; alcohol may affect your blood sugar and cause hypoglycemia.   Stay active and incorporate ~30 mins of exercise into your daily routine to manage your weight and increase the body's acceptance of insulin.          Labs ordered:  none - reminded of active lab orders     Referrals:  none     Follow-up: April 22 at 1100 via telephone     Time spent with pt: Total length of time 15 (minutes) of the encounter and more than 50% was spent counseling the patient.    Dorcas Mills, PharmD, ESPINOZA  Clinical Pharmacist  [phone]  [email or web address]      Continue all meds under the continuation of care with the referring provider and clinical pharmacy team.    Verbal consent to manage patient's drug therapy was obtained from the patient. They were informed they  may decline to participate or withdraw from participation in pharmacy services at any time.

## 2024-03-04 ENCOUNTER — APPOINTMENT (OUTPATIENT)
Dept: RADIOLOGY | Facility: CLINIC | Age: 69
End: 2024-03-04
Payer: MEDICARE

## 2024-03-12 PROCEDURE — RXMED WILLOW AMBULATORY MEDICATION CHARGE

## 2024-03-13 DIAGNOSIS — M06.9 RHEUMATOID ARTHRITIS, INVOLVING UNSPECIFIED SITE, UNSPECIFIED WHETHER RHEUMATOID FACTOR PRESENT (MULTI): ICD-10-CM

## 2024-03-13 RX ORDER — CELECOXIB 200 MG/1
200 CAPSULE ORAL
Qty: 180 CAPSULE | Refills: 0 | Status: SHIPPED | OUTPATIENT
Start: 2024-03-13

## 2024-03-22 ENCOUNTER — PHARMACY VISIT (OUTPATIENT)
Dept: PHARMACY | Facility: CLINIC | Age: 69
End: 2024-03-22
Payer: MEDICARE

## 2024-03-25 PROCEDURE — RXMED WILLOW AMBULATORY MEDICATION CHARGE

## 2024-03-28 PROCEDURE — RXMED WILLOW AMBULATORY MEDICATION CHARGE

## 2024-04-01 ENCOUNTER — PHARMACY VISIT (OUTPATIENT)
Dept: PHARMACY | Facility: CLINIC | Age: 69
End: 2024-04-01
Payer: MEDICARE

## 2024-04-01 PROCEDURE — RXMED WILLOW AMBULATORY MEDICATION CHARGE

## 2024-04-10 ENCOUNTER — TELEPHONE (OUTPATIENT)
Dept: PRIMARY CARE | Facility: CLINIC | Age: 69
End: 2024-04-10
Payer: MEDICARE

## 2024-04-10 ENCOUNTER — OFFICE VISIT (OUTPATIENT)
Dept: PAIN MANAGEMENT | Age: 69
End: 2024-04-10
Payer: MEDICARE

## 2024-04-10 VITALS
SYSTOLIC BLOOD PRESSURE: 122 MMHG | TEMPERATURE: 97.1 F | WEIGHT: 250 LBS | DIASTOLIC BLOOD PRESSURE: 74 MMHG | BODY MASS INDEX: 40.18 KG/M2 | HEIGHT: 66 IN

## 2024-04-10 DIAGNOSIS — M47.817 LUMBOSACRAL SPONDYLOSIS WITHOUT MYELOPATHY: Primary | ICD-10-CM

## 2024-04-10 DIAGNOSIS — M06.9 RHEUMATOID ARTHRITIS, INVOLVING UNSPECIFIED SITE, UNSPECIFIED WHETHER RHEUMATOID FACTOR PRESENT (HCC): ICD-10-CM

## 2024-04-10 PROCEDURE — 1123F ACP DISCUSS/DSCN MKR DOCD: CPT | Performed by: NURSE PRACTITIONER

## 2024-04-10 PROCEDURE — 3078F DIAST BP <80 MM HG: CPT | Performed by: NURSE PRACTITIONER

## 2024-04-10 PROCEDURE — 99214 OFFICE O/P EST MOD 30 MIN: CPT | Performed by: NURSE PRACTITIONER

## 2024-04-10 PROCEDURE — 3074F SYST BP LT 130 MM HG: CPT | Performed by: NURSE PRACTITIONER

## 2024-04-10 ASSESSMENT — ENCOUNTER SYMPTOMS
DIARRHEA: 0
CONSTIPATION: 0
RESPIRATORY NEGATIVE: 1
BACK PAIN: 1

## 2024-04-10 NOTE — TELEPHONE ENCOUNTER
Raya- pharmacist from [facility] was calling to see why the patient isn't on a statin (a type of cholesterol therapy for people with diabetes?)    I wasnn't quite sure how to answer.   Thanks

## 2024-04-10 NOTE — PROGRESS NOTES
Patient: Martha Ellis  YOB: 1955  Date: 4/10/24        Subjective:     Martha Ellis is a 68 y.o. female who complains today of:    Chief Complaint   Patient presents with   • Lower Back Pain   • Follow-up         Allergies:  Erythromycin, Sulfa antibiotics, Codeine, and Percocet [oxycodone-acetaminophen]    Past Medical History:   Diagnosis Date   • Chronic back pain    • Hemorrhoid    • HTN (hypertension)    • Panic attacks    • Rheumatoid arthritis(714.0)    • Rosacea      Past Surgical History:   Procedure Laterality Date   • CARPAL TUNNEL RELEASE      right   • CHOLECYSTECTOMY  05/10/2017   • GLAUCOMA SURGERY Bilateral 2017    Dr Kitchen   • HEMORRHOID SURGERY     • HERNIA REPAIR  2018    Abdominal/ umbilical   • MOHS SURGERY  14    DR MURPHY   • TUBAL LIGATION       Family History   Problem Relation Age of Onset   • Cancer Mother         cervical   • High Blood Pressure Mother    • Diabetes Father    • High Blood Pressure Father    • Diabetes Sister      Social History     Socioeconomic History   • Marital status:      Spouse name: Not on file   • Number of children: Not on file   • Years of education: Not on file   • Highest education level: Not on file   Occupational History   • Not on file   Tobacco Use   • Smoking status: Former     Current packs/day: 0.00     Average packs/day: 1 pack/day for 15.0 years (15.0 ttl pk-yrs)     Types: Cigarettes     Start date: 1969     Quit date: 1984     Years since quittin.3   • Smokeless tobacco: Never   Substance and Sexual Activity   • Alcohol use: Not on file   • Drug use: Not on file   • Sexual activity: Not on file   Other Topics Concern   • Not on file   Social History Narrative   • Not on file     Social Determinants of Health     Financial Resource Strain: Not on file   Food Insecurity: Not on file   Transportation Needs: Not on file   Physical Activity: Not on file   Stress: Not on file   Social Connections:

## 2024-04-12 ENCOUNTER — TELEPHONE (OUTPATIENT)
Dept: PAIN MANAGEMENT | Age: 69
End: 2024-04-12

## 2024-04-12 NOTE — TELEPHONE ENCOUNTER
LEFT [id number] RFA    AUTH APPROVED FROM 4/22/24-5/3/24    REFERRAL # [id number]  OK to schedule procedure approved as above.   Please note sides/levels approved and date range.   (If applicable, sides/levels approved may differ from those ordered)    TO BE SCHEDULED WITH

## 2024-04-22 ENCOUNTER — TELEMEDICINE (OUTPATIENT)
Dept: PHARMACY | Facility: HOSPITAL | Age: 69
End: 2024-04-22
Payer: MEDICARE

## 2024-04-22 DIAGNOSIS — E11.9 TYPE 2 DIABETES MELLITUS WITHOUT COMPLICATION, WITHOUT LONG-TERM CURRENT USE OF INSULIN (MULTI): Primary | ICD-10-CM

## 2024-04-22 NOTE — PROGRESS NOTES
Attempt 1: 1102 - LVM  Attempt 2: 1113 - success      Patient ID: Marianna Buck is a 68 y.o. female who presents for Diabetes.Pt is here for follow-up appointment.    Referring Provider: Tom Bingham DO   Last visit with PCP: 1.31.24  Next appt: 5.1.24    Verbal consent to manage patient's drug therapy was obtained from patient. They were informed they may decline to participate or withdraw from participation in pharmacy services at any time. Patient is agreeable to detailed voice messages if unable to be reached.       Subjective   Treatment Adherence:   Patient did take medications today.   Number of missed doses in last 7 days: 0.  Why? 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 pepper      HPI  DIABETES MELLITUS TYPE 2:  Diagnosed with diabetes:  >1 yr. Known diabetic complications: none. Optometry exam current (within 1year):  No, not found in chart review  Most recent visit in Podiatry was on - not found in chart review     Current diabetic medications include:  Ozempic 1mg weekly    Patient confirms above regimen.     Home blood glucose records (mg/dL) - last upload March 5  DEXCOM G7 -  Sensor Capture   14 day capture rate = 64%    Average Blood Sugars  14 day average = 121   Time in target (14 days)  Very High >250 = 0%  High         181-250 = 2%  Target       = 94%  Low          54-69 = 3%  Very Low  <54 = <1%   Number of Low Events (14 days)  #  of times BG < 70mg/dL = >10       Any episodes of hypoglycemia? Yes;  patient reports a few episodes which she attributes to extended time between meals.  Did patient treat episode of hypoglycemia appropriately? Yes,   Attributes lows to forgetting to eat  Does pt have proteinuria? No, NONE FOUND  If appropriate, is patient on ACEi/ARB? N/A    Secondary Prevention  Statin? No,   .  ACE-I/ARB? N/A.  Aspirin? Yes, 81mg daily .    Pertinent PMH Review:  PMH of Pancreatitis: No  PMH of Retinopathy: No  PMH of Urinary Tract Infections: Yes  PMH of MTC: No      Review of Systems    Objective     There were no vitals taken for this visit.     Labs  Lab Results   Component Value Date    BILITOT 0.5 01/31/2024    CALCIUM 9.3 01/31/2024    CO2 29 01/31/2024    CL 98 01/31/2024    CREATININE 0.98 01/31/2024    GLUCOSE 110 (H) 01/31/2024    ALKPHOS 68 01/31/2024    K 4.6 01/31/2024    PROT 7.4 01/31/2024     01/31/2024    AST 33 01/31/2024    ALT 32 01/31/2024    BUN 17 01/31/2024    ANIONGAP 17 01/31/2024    MG 2.00 06/04/2020    ALBUMIN 3.9 01/31/2024    LIPASE 55 09/29/2018    GFRF 88 03/31/2023     Lab Results   Component Value Date    TRIG 264 (H) 01/31/2024    CHOL 157 01/31/2024    LDLCALC 64 01/31/2024    HDL 40.6 01/31/2024     Lab Results   Component Value Date    HGBA1C 5.9 (H) 01/31/2024       Current Outpatient Medications   Medication Instructions    ACCU-CHEK SOFTCLIX LANCETS MISC 1 Lancet, miscellaneous, 2 times daily, Use as directed to test blood sugar twice daily    aspirin 81 mg, oral    blood sugar diagnostic (Accu-Chek Guide test strips) strip 1 each, miscellaneous, 2 times daily, TEST TWICE DAILY    blood-glucose sensor (Dexcom G7 Sensor) device Place one sensor every 10  days on back of arm to check sugars.    celecoxib (CELEBREX) 200 mg, oral, 2 times daily Nitrate    cyclobenzaprine (FLEXERIL) 5-10 mg, oral, Nightly PRN, TAKE 1/2 OR 1 TABLET BY MOUTH NIGHTLY AS NEEDED    docusate sodium (COLACE) 100 mg, oral, 2 times daily    DULoxetine (CYMBALTA) 60 mg, oral, Daily    estradiol (ESTRACE) 0.5 mg, oral, Daily    hydroxychloroquine (Plaquenil) 200 mg tablet Take 1 tablet (200 mg) by mouth in the morning and in the evening.    levothyroxine (SYNTHROID, LEVOXYL) 50 mcg, oral, Daily    loperamide (Anti-DiarrheaL, loperamide,) 2 mg tablet Take 2 tablets (4 mg) by mouth after 1st loose stool and 1 tablet after each subsequent bowel movement do not exceed 16 mg (8 tablets) per day    magnesium oxide (MAG-OX) 400 mg, oral, Daily    metFORMIN (Glucophage) 500 mg tablet TAKE 1 TABLET IN THE       MORNING AND EVENING WITH   MEALS    metoprolol succinate XL (TOPROL-XL) 25 mg, oral, Daily    metroNIDAZOLE (Metrogel) 1 % gel 1 Application, Topical, Daily, APPLY SPARINGLY TO AFFECTED AREA(S) ONCE DAILY    omeprazole (PRILOSEC) 40 mg, oral, Daily before breakfast    ondansetron ODT (Zofran-ODT) 4 mg disintegrating tablet Dissolve 1 tablet (4mg) and place on the tongue where it will dissolve, and then swallow every 4-6 hours as needed for nausea/vomiting    Ozempic 1 mg, subcutaneous, Once Weekly    potassium chloride ER (Micro-K) 10 mEq ER capsule 20 mEq, oral, Daily    pregabalin (Lyrica) 150 mg capsule Take 1 capsule (150 mg) by mouth 3 times a day. (In the moring, evening and before bed)    traMADol (Ultram) 50 mg tablet Take 1 tablet (50 mg) by mouth every 6 hours if needed for severe pain (pain scale 7-10).    triamterene-hydrochlorothiazid (Dyazide) 37.5-25 mg capsule 1 capsule, oral, Every morning    triamterene-hydrochlorothiazid (Maxzide-25) 37.5-25 mg tablet TAKE 1 TABLET BY MOUTH EVERY MORNING         Assessment/Plan   Problem List Items Addressed This Visit             ICD-10-CM     Diabetes (Multi) E11.9     Patient's goal A1c is < 7%.  Is pt at goal? Yes, 5.9% on 1/31/24  Patient's SMBGs are at goal.  Rationale for plan: Patient A1c well below goal, tolerating 1mg Ozempic well. Confirms stopping metformin. Is not having as many lows anymore. Reminded to keep meals on schedule or have small snack between meals.       Medication Changes:  CONTINUE  Ozempic 1mg weekly         Diabetes Education  Rule of 15: eating ~15 g of carbs when BG less than 80 (half cup juice, 3-4 glucose tabs).  Recognize symptoms of high and low blood sugar.   Eat a realistic healthy diet consisting of fruits, vegetables, fiber, protein food choices on a regular basis and be aware of portion/serving sizes. Reduce carbohydrate consumption and always consume with protein and fat. Avoid foods high in saturated/trans fat, high salt content, and sweets and beverages with added sugars.  Limit alcohol consumption; alcohol may affect your blood sugar and cause hypoglycemia.   Stay active and incorporate ~30 mins of exercise into your daily routine to manage your weight and increase the body's acceptance of insulin.        Statin therapy discussed for cardioprotection in setting of diabetes. LDL is at goal but TG are elevated. Patient states she would like to discuss at next PCP appt.      Labs ordered:  none     Referrals:  none     Follow-up: May 31 at 11am via telephone with Dorcas     Time spent with pt: Total length of time 15 (minutes) of the encounter and more than 50% was spent counseling the patient.    Dorcas Mills, PharmD, ESPINOZA  Clinical Pharmacist  [phone]  [email or web address]      Continue all meds under the continuation of care with the referring provider and clinical pharmacy team.    Verbal consent to manage patient's drug therapy was obtained from the patient. They were informed they may decline to participate or withdraw from participation in pharmacy services at any time.

## 2024-04-22 NOTE — ASSESSMENT & PLAN NOTE
Patient's goal A1c is < 7%.  Is pt at goal? Yes, 5.9% on 1/31/24  Patient's SMBGs are at goal.  Rationale for plan: Patient A1c well below goal, tolerating 1mg Ozempic well. Confirms stopping metformin. Is not having as many lows anymore. Reminded to keep meals on schedule or have small snack between meals.       Medication Changes:  CONTINUE  Ozempic 1mg weekly         Diabetes Education  Rule of 15: eating ~15 g of carbs when BG less than 80 (half cup juice, 3-4 glucose tabs).  Recognize symptoms of high and low blood sugar.   Eat a realistic healthy diet consisting of fruits, vegetables, fiber, protein food choices on a regular basis and be aware of portion/serving sizes. Reduce carbohydrate consumption and always consume with protein and fat. Avoid foods high in saturated/trans fat, high salt content, and sweets and beverages with added sugars.  Limit alcohol consumption; alcohol may affect your blood sugar and cause hypoglycemia.   Stay active and incorporate ~30 mins of exercise into your daily routine to manage your weight and increase the body's acceptance of insulin.

## 2024-04-24 PROCEDURE — RXMED WILLOW AMBULATORY MEDICATION CHARGE

## 2024-04-29 ENCOUNTER — PHARMACY VISIT (OUTPATIENT)
Dept: PHARMACY | Facility: CLINIC | Age: 69
End: 2024-04-29
Payer: MEDICARE

## 2024-04-30 NOTE — PROGRESS NOTES
Subjective   Patient ID: Marianna Buck is a 68 y.o. female who presents for Back Pain and Diabetes.  HPI  Patient presents today for a follow-up on Back Pain. Is taking Tramadol 50 MG. States she has no concerns with this medication. Rates the pain a 8/10 over the past 7 days. Reports that the medication gives 80% pain control/relief. OARRS reviewed today. Controlled substance contract signed on 8-2-23. UDS 8-2-23.    DM  Does check glucose at home   Today glucose was 125  Not Eats a generally healthy diet   Staying active   Currently taking OZEMPIC  Last A1c on 1/314/24 @5.9%  Last eye exam 12/2023  Does not see a Podiatrist    She is feeling hungry at night therefore we will go ahead and increase her Ozempic      Has no other new problem /question.    Review of systems  ; Patient seen today for exam denies any problems with headaches or vision, denies any shortness of breath chest pain nausea or vomiting, no black stool no blood in the stool no heartburn type symptoms denies any problems with constipation or diarrhea, and no dysuria-type symptoms    The patient's allergies medications were reviewed with them today    The patient's social family and surgical history or also reviewed here today, along with her past medical history.     Objective     Alert and active in  no acute distress  HEENT TMs clear oropharynx negative nares clear no drainage noted neck supple  With no adenopathy   Heart regular rate and rhythm without murmur and no carotid bruits  Lungs- clear to auscultation bilaterally, no wheeze or rhonchi noted  Thyroid -negative masses or nodularity  Abdomen- soft times four quadrants , bowel sounds positive no masses or organomegaly, negative tenderness guarding or rebound  Neurological exam unremarkable- DTRs in upper and lower extremities within normal limits.   skin -no lesions noted      /66 (BP Location: Left arm, Patient Position: Sitting, BP Cuff Size: Large adult)   Pulse 68   Temp 36.1  "°C (97 °F) (Temporal)   Resp 16   Ht 1.676 m (5' 6\")   Wt 112 kg (246 lb)   SpO2 98%   BMI 39.71 kg/m²     Allergies   Allergen Reactions    Oxycodone-Acetaminophen Other    Sulfa (Sulfonamide Antibiotics) Other     Adverse reaction    Codeine Rash       Assessment/Plan   Problem List Items Addressed This Visit       Chronic back pain - Primary    Type 2 diabetes mellitus with hyperglycemia, without long-term current use of insulin (Multi)    HTN (hypertension)    Paroxysmal SVT (supraventricular tachycardia) (CMS-[facility])    BMI 39.0-39.9,adult    Class 2 obesity due to excess calories without serious comorbidity with body mass index (BMI) of 39.0 to 39.9 in adult     Other Visit Diagnoses       Back pain, unspecified back location, unspecified back pain laterality, unspecified chronicity        Left knee pain, unspecified chronicity        Colon cancer screening        Relevant Orders    Cologuard® colon cancer screening          Discussed patient's BMI and to institute calorie reduction and increase exercise to decrease risk of diabetes and heart disease in the future.    I personally reviewed the OARRS report for this patient. I have considered the risks of abuse, dependence, addiction, and diversion.  CSA 8/2/23  UDS 8/2/23    Refill Tramadol.    Increase Ozempic to 2 mg dose.    Cologuard ordered.    A1C performed, 6.5 %.    If anything worsens or changes please call us at once, follow up in the office as planned.    Scribe Attestation  By signing my name below, I, Jennifer Bland MA, Scribe   attest that this documentation has been prepared under the direction and in the presence of Tom Bingham DO.     "

## 2024-05-01 ENCOUNTER — OFFICE VISIT (OUTPATIENT)
Dept: PRIMARY CARE | Facility: CLINIC | Age: 69
End: 2024-05-01
Payer: MEDICARE

## 2024-05-01 VITALS
SYSTOLIC BLOOD PRESSURE: 120 MMHG | HEART RATE: 68 BPM | WEIGHT: 246 LBS | DIASTOLIC BLOOD PRESSURE: 66 MMHG | BODY MASS INDEX: 39.53 KG/M2 | TEMPERATURE: 97 F | OXYGEN SATURATION: 98 % | HEIGHT: 66 IN | RESPIRATION RATE: 16 BRPM

## 2024-05-01 DIAGNOSIS — E11.9 TYPE 2 DIABETES MELLITUS WITHOUT COMPLICATION, WITHOUT LONG-TERM CURRENT USE OF INSULIN (MULTI): ICD-10-CM

## 2024-05-01 DIAGNOSIS — E66.09 CLASS 2 OBESITY DUE TO EXCESS CALORIES WITHOUT SERIOUS COMORBIDITY WITH BODY MASS INDEX (BMI) OF 39.0 TO 39.9 IN ADULT: ICD-10-CM

## 2024-05-01 DIAGNOSIS — M54.50 CHRONIC LOW BACK PAIN, UNSPECIFIED BACK PAIN LATERALITY, UNSPECIFIED WHETHER SCIATICA PRESENT: Primary | ICD-10-CM

## 2024-05-01 DIAGNOSIS — I47.10 PAROXYSMAL SVT (SUPRAVENTRICULAR TACHYCARDIA) (CMS-HCC): ICD-10-CM

## 2024-05-01 DIAGNOSIS — G89.29 CHRONIC LOW BACK PAIN, UNSPECIFIED BACK PAIN LATERALITY, UNSPECIFIED WHETHER SCIATICA PRESENT: Primary | ICD-10-CM

## 2024-05-01 DIAGNOSIS — E11.65 TYPE 2 DIABETES MELLITUS WITH HYPERGLYCEMIA, WITHOUT LONG-TERM CURRENT USE OF INSULIN (MULTI): ICD-10-CM

## 2024-05-01 DIAGNOSIS — I10 PRIMARY HYPERTENSION: ICD-10-CM

## 2024-05-01 DIAGNOSIS — M25.562 LEFT KNEE PAIN, UNSPECIFIED CHRONICITY: ICD-10-CM

## 2024-05-01 DIAGNOSIS — M54.9 BACK PAIN, UNSPECIFIED BACK LOCATION, UNSPECIFIED BACK PAIN LATERALITY, UNSPECIFIED CHRONICITY: ICD-10-CM

## 2024-05-01 DIAGNOSIS — Z12.11 COLON CANCER SCREENING: ICD-10-CM

## 2024-05-01 PROBLEM — E66.812 CLASS 2 OBESITY DUE TO EXCESS CALORIES WITHOUT SERIOUS COMORBIDITY WITH BODY MASS INDEX (BMI) OF 39.0 TO 39.9 IN ADULT: Status: ACTIVE | Noted: 2024-05-01

## 2024-05-01 LAB — POC HEMOGLOBIN A1C: 6.5 % (ref 4.2–6.5)

## 2024-05-01 PROCEDURE — 3074F SYST BP LT 130 MM HG: CPT | Performed by: FAMILY MEDICINE

## 2024-05-01 PROCEDURE — 3062F POS MACROALBUMINURIA REV: CPT | Performed by: FAMILY MEDICINE

## 2024-05-01 PROCEDURE — 1158F ADVNC CARE PLAN TLK DOCD: CPT | Performed by: FAMILY MEDICINE

## 2024-05-01 PROCEDURE — 1159F MED LIST DOCD IN RCRD: CPT | Performed by: FAMILY MEDICINE

## 2024-05-01 PROCEDURE — 99214 OFFICE O/P EST MOD 30 MIN: CPT | Performed by: FAMILY MEDICINE

## 2024-05-01 PROCEDURE — 3078F DIAST BP <80 MM HG: CPT | Performed by: FAMILY MEDICINE

## 2024-05-01 PROCEDURE — 1036F TOBACCO NON-USER: CPT | Performed by: FAMILY MEDICINE

## 2024-05-01 PROCEDURE — 3008F BODY MASS INDEX DOCD: CPT | Performed by: FAMILY MEDICINE

## 2024-05-01 PROCEDURE — 1160F RVW MEDS BY RX/DR IN RCRD: CPT | Performed by: FAMILY MEDICINE

## 2024-05-01 PROCEDURE — RXMED WILLOW AMBULATORY MEDICATION CHARGE

## 2024-05-01 PROCEDURE — 3048F LDL-C <100 MG/DL: CPT | Performed by: FAMILY MEDICINE

## 2024-05-01 PROCEDURE — 3044F HG A1C LEVEL LT 7.0%: CPT | Performed by: FAMILY MEDICINE

## 2024-05-01 PROCEDURE — 83036 HEMOGLOBIN GLYCOSYLATED A1C: CPT | Performed by: FAMILY MEDICINE

## 2024-05-01 PROCEDURE — 1123F ACP DISCUSS/DSCN MKR DOCD: CPT | Performed by: FAMILY MEDICINE

## 2024-05-01 RX ORDER — TRAMADOL HYDROCHLORIDE 50 MG/1
50 TABLET ORAL EVERY 6 HOURS PRN
Qty: 120 TABLET | Refills: 2 | Status: SHIPPED | OUTPATIENT
Start: 2024-05-01

## 2024-05-02 ENCOUNTER — OFFICE VISIT (OUTPATIENT)
Dept: PAIN MANAGEMENT | Age: 69
End: 2024-05-02
Payer: MEDICARE

## 2024-05-02 DIAGNOSIS — M47.817 LUMBOSACRAL SPONDYLOSIS WITHOUT MYELOPATHY: ICD-10-CM

## 2024-05-02 PROCEDURE — 64636 DESTROY L/S FACET JNT ADDL: CPT | Performed by: PAIN MEDICINE

## 2024-05-02 PROCEDURE — 64635 DESTROY LUMB/SAC FACET JNT: CPT | Performed by: PAIN MEDICINE

## 2024-05-02 RX ORDER — LIDOCAINE HYDROCHLORIDE 10 MG/ML
3 INJECTION, SOLUTION EPIDURAL; INFILTRATION; INTRACAUDAL; PERINEURAL ONCE
Status: COMPLETED | OUTPATIENT
Start: 2024-05-02 | End: 2024-05-02

## 2024-05-02 RX ORDER — BETAMETHASONE SODIUM PHOSPHATE AND BETAMETHASONE ACETATE 3; 3 MG/ML; MG/ML
6 INJECTION, SUSPENSION INTRA-ARTICULAR; INTRALESIONAL; INTRAMUSCULAR; SOFT TISSUE ONCE
Status: COMPLETED | OUTPATIENT
Start: 2024-05-02 | End: 2024-05-02

## 2024-05-02 RX ADMIN — LIDOCAINE HYDROCHLORIDE 3 ML: 10 INJECTION, SOLUTION EPIDURAL; INFILTRATION; INTRACAUDAL; PERINEURAL at 09:49

## 2024-05-02 RX ADMIN — BETAMETHASONE SODIUM PHOSPHATE AND BETAMETHASONE ACETATE 6 MG: 3; 3 INJECTION, SUSPENSION INTRA-ARTICULAR; INTRALESIONAL; INTRAMUSCULAR; SOFT TISSUE at 09:49

## 2024-05-02 NOTE — PROGRESS NOTES
[facility]  [facility]  [address], [address]  [place], [place]  P: [phone]  F: [phone]      Lumbar Radio Frequency Ablation     Provider: ARSLAN DALAL DO          Patient Name: Martha Ellis : 1955        Date: 2024      Martha Ellis is here today for interventional pain management.  Standard ASI guidelines were followed and sterile technique used.  Area was cleaned with Betadine x3.  Informed consent was obtained.  Fluoroscopic guidance was used for this procedure. Multiple views of fluoroscopy were used during procedure to assist with needle placement. Appropriate sized RF 10mm active tip needle was used and advance to appropriate anatomic location.    There was appropriate multifidus contraction noted with motor stimulation at 2 Hz between 0.5-1.5 volts. No limb or gluteal contraction was noted taking it up to 3.5 volts. Prior to lesioning at 80 degrees Celsius for 90 seconds, approximately 0.75mg/1mg of Celestone and ½ cc of 1% preservative free Lidocaine was injected. Impedance was between 200-500 ohms during the procedure.     Patient tolerated the procedure well, no obvious complications occurred during the procedure.  Patient was appropriately monitored and discharged home in stable condition with their usual motor strength. Post Op instructions were given to patient.          [] Bilateral [] T11 [] L1 [] S1     [] T12 [] L2 [] S2    [] Right  [x] L3 [] S3      [x] L4 [] S4    [x] Left  [x] L5                              ARSLAN DALAL DO

## 2024-05-08 ENCOUNTER — PHARMACY VISIT (OUTPATIENT)
Dept: PHARMACY | Facility: CLINIC | Age: 69
End: 2024-05-08
Payer: MEDICARE

## 2024-05-14 ENCOUNTER — PHARMACY VISIT (OUTPATIENT)
Dept: PHARMACY | Facility: CLINIC | Age: 69
End: 2024-05-14
Payer: MEDICARE

## 2024-05-14 DIAGNOSIS — M79.7 FIBROMYALGIA: ICD-10-CM

## 2024-05-14 DIAGNOSIS — E03.9 HYPOTHYROIDISM, UNSPECIFIED TYPE: ICD-10-CM

## 2024-05-14 PROCEDURE — RXMED WILLOW AMBULATORY MEDICATION CHARGE

## 2024-05-14 RX ORDER — DULOXETIN HYDROCHLORIDE 60 MG/1
60 CAPSULE, DELAYED RELEASE ORAL DAILY
Qty: 90 CAPSULE | Refills: 1 | Status: SHIPPED | OUTPATIENT
Start: 2024-05-14

## 2024-05-14 RX ORDER — LEVOTHYROXINE SODIUM 50 UG/1
50 TABLET ORAL DAILY
Qty: 90 TABLET | Refills: 1 | Status: SHIPPED | OUTPATIENT
Start: 2024-05-14

## 2024-05-22 ENCOUNTER — APPOINTMENT (OUTPATIENT)
Dept: CARDIOLOGY | Facility: CLINIC | Age: 69
End: 2024-05-22
Payer: MEDICARE

## 2024-05-23 ENCOUNTER — OFFICE VISIT (OUTPATIENT)
Dept: PAIN MANAGEMENT | Age: 69
End: 2024-05-23
Payer: MEDICARE

## 2024-05-23 VITALS
WEIGHT: 245 LBS | HEIGHT: 66 IN | TEMPERATURE: 97.4 F | BODY MASS INDEX: 39.37 KG/M2 | SYSTOLIC BLOOD PRESSURE: 130 MMHG | DIASTOLIC BLOOD PRESSURE: 70 MMHG

## 2024-05-23 DIAGNOSIS — M46.1 BILATERAL SACROILIITIS (HCC): Primary | ICD-10-CM

## 2024-05-23 DIAGNOSIS — M47.817 LUMBOSACRAL SPONDYLOSIS WITHOUT MYELOPATHY: ICD-10-CM

## 2024-05-23 DIAGNOSIS — M06.9 RHEUMATOID ARTHRITIS, INVOLVING UNSPECIFIED SITE, UNSPECIFIED WHETHER RHEUMATOID FACTOR PRESENT (HCC): ICD-10-CM

## 2024-05-23 PROCEDURE — 99214 OFFICE O/P EST MOD 30 MIN: CPT | Performed by: NURSE PRACTITIONER

## 2024-05-23 PROCEDURE — 1123F ACP DISCUSS/DSCN MKR DOCD: CPT | Performed by: NURSE PRACTITIONER

## 2024-05-23 PROCEDURE — 3075F SYST BP GE 130 - 139MM HG: CPT | Performed by: NURSE PRACTITIONER

## 2024-05-23 PROCEDURE — 3078F DIAST BP <80 MM HG: CPT | Performed by: NURSE PRACTITIONER

## 2024-05-23 RX ORDER — SEMAGLUTIDE 1.34 MG/ML
2 INJECTION, SOLUTION SUBCUTANEOUS
COMMUNITY
Start: 2024-04-12

## 2024-05-23 ASSESSMENT — ENCOUNTER SYMPTOMS
BACK PAIN: 1
DIARRHEA: 0
RESPIRATORY NEGATIVE: 1
CONSTIPATION: 0

## 2024-05-23 NOTE — PROGRESS NOTES
Patient: Martha Ellis  YOB: 1955  Date: 24        Subjective:     Martha Ellis is a 68 y.o. female who complains today of:    Chief Complaint   Patient presents with    Follow-up    Leg Pain     Left, buttocks         Allergies:  Erythromycin, Sulfa antibiotics, Codeine, and Percocet [oxycodone-acetaminophen]    Past Medical History:   Diagnosis Date    Chronic back pain     Hemorrhoid     HTN (hypertension)     Panic attacks     Rheumatoid arthritis(714.0)     Rosacea      Past Surgical History:   Procedure Laterality Date    CARPAL TUNNEL RELEASE      right    CHOLECYSTECTOMY  05/10/2017    GLAUCOMA SURGERY Bilateral 2017    Dr Kitchen    HEMORRHOID SURGERY      HERNIA REPAIR  2018    Abdominal/ umbilical    MOHS SURGERY  14    DR MURPHY    TUBAL LIGATION       Family History   Problem Relation Age of Onset    Cancer Mother         cervical    High Blood Pressure Mother     Diabetes Father     High Blood Pressure Father     Diabetes Sister      Social History     Socioeconomic History    Marital status:      Spouse name: Not on file    Number of children: Not on file    Years of education: Not on file    Highest education level: Not on file   Occupational History    Not on file   Tobacco Use    Smoking status: Former     Current packs/day: 0.00     Average packs/day: 1 pack/day for 15.0 years (15.0 ttl pk-yrs)     Types: Cigarettes     Start date: 1969     Quit date: 1984     Years since quittin.4    Smokeless tobacco: Never   Substance and Sexual Activity    Alcohol use: Not on file    Drug use: Not on file    Sexual activity: Not on file   Other Topics Concern    Not on file   Social History Narrative    Not on file     Social Determinants of Health     Financial Resource Strain: Not on file   Food Insecurity: Not on file   Transportation Needs: Not on file   Physical Activity: Not on file   Stress: Not on file   Social Connections: Not on file

## 2024-05-24 PROCEDURE — RXMED WILLOW AMBULATORY MEDICATION CHARGE

## 2024-05-28 DIAGNOSIS — I10 HYPERTENSION, UNSPECIFIED TYPE: ICD-10-CM

## 2024-05-28 DIAGNOSIS — N95.1 MENOPAUSAL STATE: ICD-10-CM

## 2024-05-28 PROCEDURE — RXMED WILLOW AMBULATORY MEDICATION CHARGE

## 2024-05-28 RX ORDER — METOPROLOL SUCCINATE 25 MG/1
25 TABLET, EXTENDED RELEASE ORAL DAILY
Qty: 90 TABLET | Refills: 0 | Status: SHIPPED | OUTPATIENT
Start: 2024-05-28

## 2024-05-28 RX ORDER — ESTRADIOL 0.5 MG/1
0.5 TABLET ORAL DAILY
Qty: 90 TABLET | Refills: 1 | Status: SHIPPED | OUTPATIENT
Start: 2024-05-28

## 2024-05-28 NOTE — TELEPHONE ENCOUNTER
Rx Refill Request Telephone Encounter    Name:  Marianna Buck  : 1955     Medication Name:  metoprolol succinate XL (Toprol-XL) 25 mg 24 hr tablet   Quantity (Optional):    90  Directions (Optional):   Take 1 tablet (25 mg) by mouth once daily.     Medication Name:  estradiol (Estrace) 0.5 mg tablet  Quantity (Optional):    90  Directions (Optional):   Take 1 tablet (0.5 mg) by mouth once daily     Specific Pharmacy location:  [facility]     Date of last appointment:  24

## 2024-05-29 ENCOUNTER — PHARMACY VISIT (OUTPATIENT)
Dept: PHARMACY | Facility: CLINIC | Age: 69
End: 2024-05-29
Payer: MEDICARE

## 2024-05-31 ENCOUNTER — TELEMEDICINE (OUTPATIENT)
Dept: PHARMACY | Facility: HOSPITAL | Age: 69
End: 2024-05-31
Payer: MEDICARE

## 2024-05-31 DIAGNOSIS — E11.9 TYPE 2 DIABETES MELLITUS WITHOUT COMPLICATION, WITHOUT LONG-TERM CURRENT USE OF INSULIN (MULTI): ICD-10-CM

## 2024-05-31 DIAGNOSIS — E11.65 TYPE 2 DIABETES MELLITUS WITH HYPERGLYCEMIA, WITHOUT LONG-TERM CURRENT USE OF INSULIN (MULTI): Primary | ICD-10-CM

## 2024-05-31 NOTE — PROGRESS NOTES
Patient ID: Marianna Buck is a 68 y.o. female who presents for Diabetes Pt is here for follow-up appointment.    Referring Provider: Tom Bingham DO   Last visit with PCP: 5.1.24  Next appt: Not scheduled, encouraged    Verbal consent to manage patient's drug therapy was obtained from patient. They were informed they may decline to participate or withdraw from participation in pharmacy services at any time. Patient is agreeable to detailed voice messages if unable to be reached.       Subjective   Treatment Adherence:   Patient did take medications today.   Number of missed doses in last 7 days: 0.  Why? 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 pepper      HPI  DIABETES MELLITUS TYPE 2:  Diagnosed with diabetes:  >1 yr. Known diabetic complications: none. Optometry exam current (within 1year):  No, not found in chart review  Most recent visit in Podiatry was on - not found in chart review     Current diabetic medications include:  Ozempic 2mg weekly    Patient confirms above regimen.     Home blood glucose records (mg/dL) - report uploaded to chart  DEXCOM G7 -  Sensor Capture   14 day capture rate = 86%   Average Blood Sugars  14  day average = 130   Time in target (14 days)  Very High >250 = 0%  High         181-250 = 4%  Target       = 95%  Low          54-69 = 1%  Very Low  <54 = 0%   Number of Low Events (14 days)  #  of times BG < 70mg/dL = 5       Any episodes of hypoglycemia? Yes;  patient reports a few episodes which she attributes to extended time between meals.  Did patient treat episode of hypoglycemia appropriately? Yes,   Attributes lows to forgetting to eat  Does pt have proteinuria? No, NONE FOUND  If appropriate, is patient on ACEi/ARB? N/A    Secondary Prevention  Statin? No,   .  ACE-I/ARB? N/A.  Aspirin? Yes, 81mg daily .    Pertinent PMH Review:  PMH of Pancreatitis: No  PMH of Retinopathy: No  PMH of Urinary Tract Infections: Yes  PMH of MTC: No      Review of Systems    Objective     There were no vitals taken for this visit.     Labs  Lab Results   Component Value Date    BILITOT 0.5 01/31/2024    CALCIUM 9.3 01/31/2024    CO2 29 01/31/2024    CL 98 01/31/2024    CREATININE 0.98 01/31/2024    GLUCOSE 110 (H) 01/31/2024    ALKPHOS 68 01/31/2024    K 4.6 01/31/2024    PROT 7.4 01/31/2024     01/31/2024    AST 33 01/31/2024    ALT 32 01/31/2024    BUN 17 01/31/2024    ANIONGAP 17 01/31/2024    MG 2.00 06/04/2020    ALBUMIN 3.9 01/31/2024    LIPASE 55 09/29/2018    GFRF 88 03/31/2023     Lab Results   Component Value Date    TRIG 264 (H) 01/31/2024    CHOL 157 01/31/2024    LDLCALC 64 01/31/2024    HDL 40.6 01/31/2024     Lab Results   Component Value Date    HGBA1C 6.5 05/01/2024       Current Outpatient Medications   Medication Instructions    ACCU-CHEK SOFTCLIX LANCETS MISC 1 Lancet, miscellaneous, 2 times daily, Use as directed to test blood sugar twice daily    aspirin 81 mg, oral    blood sugar diagnostic (Accu-Chek Guide test strips) strip 1 each, miscellaneous, 2 times daily, TEST TWICE DAILY    blood-glucose sensor (Dexcom G7 Sensor) device Place one sensor every 10 days on back of arm to check sugars.     celecoxib (CELEBREX) 200 mg, oral, 2 times daily Nitrate    cyclobenzaprine (FLEXERIL) 5-10 mg, oral, Nightly PRN, TAKE 1/2 OR 1 TABLET BY MOUTH NIGHTLY AS NEEDED    docusate sodium (COLACE) 100 mg, oral, 2 times daily    DULoxetine (CYMBALTA) 60 mg, oral, Daily    estradiol (ESTRACE) 0.5 mg, oral, Daily    hydroxychloroquine (Plaquenil) 200 mg tablet Take 1 tablet (200 mg) by mouth in the morning and in the evening.    levothyroxine (SYNTHROID, LEVOXYL) 50 mcg, oral, Daily    loperamide (Anti-DiarrheaL, loperamide,) 2 mg tablet Take 2 tablets (4 mg) by mouth after 1st loose stool and 1 tablet after each subsequent bowel movement do not exceed 16 mg (8 tablets) per day    magnesium oxide (MAG-OX) 400 mg, oral, Daily    metoprolol succinate XL (TOPROL-XL) 25 mg, oral, Daily    metroNIDAZOLE (Metrogel) 1 % gel 1 Application, Topical, Daily, APPLY SPARINGLY TO AFFECTED AREA(S) ONCE DAILY    omeprazole (PRILOSEC) 40 mg, oral, Daily before breakfast    ondansetron ODT (Zofran-ODT) 4 mg disintegrating tablet Dissolve 1 tablet (4mg) and place on the tongue where it will dissolve, and then swallow every 4-6 hours as needed for nausea/vomiting    Ozempic 2 mg, subcutaneous, Once Weekly    pregabalin (Lyrica) 150 mg capsule Take 1 capsule (150 mg) by mouth 3 times a day. (In the moring, evening and before bed)    traMADol (ULTRAM) 50 mg, oral, Every 6 hours PRN    triamterene-hydrochlorothiazid (Maxzide-25) 37.5-25 mg tablet TAKE 1 TABLET BY MOUTH EVERY MORNING         Assessment/Plan   Problem List Items Addressed This Visit             ICD-10-CM    Type 2 diabetes mellitus with hyperglycemia, without long-term current use of insulin (Multi) - Primary E11.65     Patient's goal A1c is < 7%.  Is pt at goal? Yes, 6.5% on 5.1.24  Patient's SMBGs are at goal.  Rationale for plan: Patient A1c below goal. Ozempic was increased to 2mg at last PCP appointment, and tolerating 2mg Ozempic well. Confirms stopping metformin. Is not  having as many lows anymore. Reminded to keep meals on schedule or have small snack between meals.       Medication Changes:  CONTINUE  Ozempic 2mg weekly         Diabetes Education  Rule of 15: eating ~15 g of carbs when BG less than 80 (half cup juice, 3-4 glucose tabs).  Recognize symptoms of high and low blood sugar.   Eat a realistic healthy diet consisting of fruits, vegetables, fiber, protein food choices on a regular basis and be aware of portion/serving sizes. Reduce carbohydrate consumption and always consume with protein and fat. Avoid foods high in saturated/trans fat, high salt content, and sweets and beverages with added sugars.  Limit alcohol consumption; alcohol may affect your blood sugar and cause hypoglycemia.   Stay active and incorporate ~30 mins of exercise into your daily routine to manage your weight and increase the body's acceptance of insulin.    Ozempic Education:   Counseled patient on Ozempic MOA, expectations, side effects, duration of therapy, administration, and monitoring parameters.  Provided detailed dosing and administration counseling to ensure proper technique.   Reviewed Ozempic titration schedule, starting with 0.25 mg once weekly for 4 weeks, then continuing on 0.5 mg once weekly. Patient verbalized understanding.  Counseled patient on the benefits of GLP-1ra, such as cardiovascular risk reduction, glycemic control, and weight loss potential.  Reviewed storage requirements of Ozempic when not in use, and when to administer the medication if a dose is missed.  Advised patient that they may experience improved satiety after meals and portion sizes of meals may be reduced as doses of Ozempic increase.  Counseled patient to avoid foods that are fatty/oily as this may precipitate the nausea/GI upset that may occur with new start Ozempic.          Relevant Orders    Follow Up In [facility] - Pharmacy     Other Visit Diagnoses         Codes    Type 2 diabetes mellitus  without complication, without long-term current use of insulin (Multi)     E11.9          Statin therapy discussed for cardioprotection in setting of diabetes. LDL is at goal but TG are elevated. Patient states she would like to discuss at next PCP appt.    Follow up with Clinical Pharmacy Team 6/28/2024 at 12:00PM    Continue all meds under the continuation of care with the referring provider and clinical pharmacy team.    Please reach out to the Clinical Pharmacy Team if there are any further questions.     Verbal consent to manage patient's drug therapy was obtained from patient. They were informed they may decline to participate or withdraw from participation in pharmacy services at any time.    Yue Kelly, PharmD  [phone]

## 2024-05-31 NOTE — ASSESSMENT & PLAN NOTE
Patient's goal A1c is < 7%.  Is pt at goal? Yes, 6.5% on 5.1.24  Patient's SMBGs are at goal.  Rationale for plan: Patient A1c below goal. Ozempic was increased to 2mg at last PCP appointment, and tolerating 2mg Ozempic well. Confirms stopping metformin. Is not having as many lows anymore. Reminded to keep meals on schedule or have small snack between meals.       Medication Changes:  CONTINUE  Ozempic 2mg weekly         Diabetes Education  Rule of 15: eating ~15 g of carbs when BG less than 80 (half cup juice, 3-4 glucose tabs).  Recognize symptoms of high and low blood sugar.   Eat a realistic healthy diet consisting of fruits, vegetables, fiber, protein food choices on a regular basis and be aware of portion/serving sizes. Reduce carbohydrate consumption and always consume with protein and fat. Avoid foods high in saturated/trans fat, high salt content, and sweets and beverages with added sugars.  Limit alcohol consumption; alcohol may affect your blood sugar and cause hypoglycemia.   Stay active and incorporate ~30 mins of exercise into your daily routine to manage your weight and increase the body's acceptance of insulin.    Ozempic Education:   Counseled patient on Ozempic MOA, expectations, side effects, duration of therapy, administration, and monitoring parameters.  Provided detailed dosing and administration counseling to ensure proper technique.   Reviewed Ozempic titration schedule, starting with 0.25 mg once weekly for 4 weeks, then continuing on 0.5 mg once weekly. Patient verbalized understanding.  Counseled patient on the benefits of GLP-1ra, such as cardiovascular risk reduction, glycemic control, and weight loss potential.  Reviewed storage requirements of Ozempic when not in use, and when to administer the medication if a dose is missed.  Advised patient that they may experience improved satiety after meals and portion sizes of meals may be reduced as doses of Ozempic increase.  Counseled patient  to avoid foods that are fatty/oily as this may precipitate the nausea/GI upset that may occur with new start Ozempic.

## 2024-06-14 ENCOUNTER — APPOINTMENT (OUTPATIENT)
Dept: CARDIOLOGY | Facility: CLINIC | Age: 69
End: 2024-06-14
Payer: MEDICARE

## 2024-06-14 VITALS
DIASTOLIC BLOOD PRESSURE: 70 MMHG | SYSTOLIC BLOOD PRESSURE: 130 MMHG | HEART RATE: 67 BPM | WEIGHT: 249 LBS | BODY MASS INDEX: 40.19 KG/M2

## 2024-06-14 DIAGNOSIS — I47.10 PAROXYSMAL SVT (SUPRAVENTRICULAR TACHYCARDIA) (CMS-HCC): ICD-10-CM

## 2024-06-14 DIAGNOSIS — I10 PRIMARY HYPERTENSION: ICD-10-CM

## 2024-06-14 DIAGNOSIS — Z87.891 FORMER SMOKER: ICD-10-CM

## 2024-06-14 DIAGNOSIS — R94.31 ABNORMAL EKG: Primary | ICD-10-CM

## 2024-06-14 PROCEDURE — 3044F HG A1C LEVEL LT 7.0%: CPT | Performed by: INTERNAL MEDICINE

## 2024-06-14 PROCEDURE — 99214 OFFICE O/P EST MOD 30 MIN: CPT | Performed by: INTERNAL MEDICINE

## 2024-06-14 PROCEDURE — 93000 ELECTROCARDIOGRAM COMPLETE: CPT | Performed by: INTERNAL MEDICINE

## 2024-06-14 PROCEDURE — 3075F SYST BP GE 130 - 139MM HG: CPT | Performed by: INTERNAL MEDICINE

## 2024-06-14 PROCEDURE — 3048F LDL-C <100 MG/DL: CPT | Performed by: INTERNAL MEDICINE

## 2024-06-14 PROCEDURE — 1159F MED LIST DOCD IN RCRD: CPT | Performed by: INTERNAL MEDICINE

## 2024-06-14 PROCEDURE — 3062F POS MACROALBUMINURIA REV: CPT | Performed by: INTERNAL MEDICINE

## 2024-06-14 PROCEDURE — 3008F BODY MASS INDEX DOCD: CPT | Performed by: INTERNAL MEDICINE

## 2024-06-14 PROCEDURE — 3078F DIAST BP <80 MM HG: CPT | Performed by: INTERNAL MEDICINE

## 2024-06-14 PROCEDURE — 1036F TOBACCO NON-USER: CPT | Performed by: INTERNAL MEDICINE

## 2024-06-14 NOTE — PATIENT INSTRUCTIONS
Continue same medications/treatment.  Patient educated on proper medication use.  Patient educated on risk factor modification.  Please bring any lab results from other providers/physicians to your next appointment.    Please bring all medicines, vitamins, and herbal supplements with you when you come to the office.    Prescriptions will not be filled unless you are compliant with your follow up appointments or have a follow up appointment scheduled as per instruction of your physician. Refills should be requested at the time of your visit.    Follow up with Dr. Valladares in 1 year    Lolly DEE RN, AM SCRIBING FOR, AND IN THE PRESENCE OF DR. MALIK VALLADARES MD

## 2024-06-14 NOTE — PROGRESS NOTES
CARDIOLOGY OFFICE VISIT      CHIEF COMPLAINT  Chief Complaint   Patient presents with    Follow-up     Pt is here today following up for overdue appt        HISTORY OF PRESENT ILLNESS  HPI  68-year-old  female who is followed for tachyarrhythmic palpitations and paroxysmal SVT. 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 A 24-hour Holter monitor was obtained on 2020    Patient states that he was doing well until the last 6 months. Noticing worsening shortness of breath. An echocardiogram ordered in 2022 shows left intervention fraction 60%. Trace mitral and tricuspid regurgitation. Stress testing 2022 shows no evidence of ischemia with a left ventricular ejection fraction of 65%.    Patient underwent left knee replacement with successful results.    Patient states that so far she has been doing well.  She denies any symptoms of chest pain or shortness of breath or palpitations.    She was diagnosed with diabetes mellitus in 2023.    EKG performed today shows sinus rhythm at a rate of 67 bpm QRS duration 102 ms QT corrected 450 ms.  Rhythm strip shows the same pattern.      Past Medical History  Past Medical History:   Diagnosis Date    Personal history of other diseases of the circulatory system     History of paroxysmal supraventricular tachycardia    Personal history of other diseases of the circulatory system     History of hypertension    Personal history of other diseases of the musculoskeletal system and connective tissue     History of rheumatoid arthritis    Personal history of other malignant neoplasm of skin     History of malignant neoplasm of skin       Social History  Social History     Tobacco Use    Smoking status: Former     Current packs/day: 0.00     Types: Cigarettes     Quit date:      Years since quittin.4    Smokeless tobacco:  Never   Vaping Use    Vaping status: Never Used   Substance Use Topics    Alcohol use: Never    Drug use: Never       Family History     Family History   Problem Relation Name Age of Onset    Cancer Mother      COPD Mother      Heart failure Mother      Diabetes Father      Lung cancer Father      Glaucoma Sister          Allergies:  Allergies   Allergen Reactions    Oxycodone-Acetaminophen Other    Sulfa (Sulfonamide Antibiotics) Other     Adverse reaction    Codeine Rash        Outpatient Medications:  Current Outpatient Medications   Medication Instructions    aspirin 81 mg, oral    blood-glucose sensor (Dexcom G7 Sensor) device Place one sensor every 10 days on back of arm to check sugars.    celecoxib (CELEBREX) 200 mg, oral, 2 times daily Nitrate    cyclobenzaprine (FLEXERIL) 5-10 mg, oral, Nightly PRN, TAKE 1/2 OR 1 TABLET BY MOUTH NIGHTLY AS NEEDED    docusate sodium (COLACE) 100 mg, oral, 2 times daily    DULoxetine (CYMBALTA) 60 mg, oral, Daily    estradiol (ESTRACE) 0.5 mg, oral, Daily    hydroxychloroquine (Plaquenil) 200 mg tablet Take 1 tablet (200 mg) by mouth in the morning and in the evening.    levothyroxine (SYNTHROID, LEVOXYL) 50 mcg, oral, Daily    loperamide (Anti-DiarrheaL, loperamide,) 2 mg tablet Take 2 tablets (4 mg) by mouth after 1st loose stool and 1 tablet after each subsequent bowel movement do not exceed 16 mg (8 tablets) per day    magnesium oxide (MAG-OX) 400 mg, oral, Daily    metoprolol succinate XL (TOPROL-XL) 25 mg, oral, Daily    metroNIDAZOLE (Metrogel) 1 % gel 1 Application, Topical, Daily, APPLY SPARINGLY TO AFFECTED AREA(S) ONCE DAILY    omeprazole (PRILOSEC) 40 mg, oral, Daily before breakfast    ondansetron ODT (Zofran-ODT) 4 mg disintegrating tablet Dissolve 1 tablet (4mg) and place on the tongue where it will dissolve, and then swallow every 4-6 hours as needed for nausea/vomiting    Ozempic 2 mg, subcutaneous, Once Weekly    pregabalin (Lyrica) 150 mg capsule Take 1  capsule (150 mg) by mouth 3 times a day. (In the moring, evening and before bed)    traMADol (ULTRAM) 50 mg, oral, Every 6 hours PRN    triamterene-hydrochlorothiazid (Maxzide-25) 37.5-25 mg tablet TAKE 1 TABLET BY MOUTH EVERY MORNING          REVIEW OF SYSTEMS  Review of Systems   All other systems reviewed and are negative.        VITALS  Vitals:    06/14/24 1147   BP: 130/70   Pulse: 67       PHYSICAL EXAM  Constitutional:       Appearance: Healthy appearance. Not in distress.   Neck:      Vascular: No JVR. JVD normal.   Pulmonary:      Effort: Pulmonary effort is normal.      Breath sounds: Normal breath sounds. No wheezing. No rhonchi. No rales.   Chest:      Chest wall: Not tender to palpatation.   Cardiovascular:      PMI at left midclavicular line. Normal rate. Regular rhythm. Normal S1. Normal S2.       Murmurs: There is no murmur.      No gallop.  No click. No rub.   Pulses:     Intact distal pulses.   Edema:     Peripheral edema absent.   Abdominal:      General: Bowel sounds are normal.      Palpations: Abdomen is soft.      Tenderness: There is no abdominal tenderness.   Musculoskeletal: Normal range of motion.         General: No tenderness. Skin:     General: Skin is warm and dry.   Neurological:      General: No focal deficit present.      Mental Status: Alert and oriented to person, place and time.           ASSESSMENT AND PLAN  Clinical impressions:  1. 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  2. Normal left ventricular function per left heart catheterization dated August 7, 2019 with normal coronaries. No evidence of ischemia per stress test in 2022, normal ventricular function per echocardiogram in 2022  3. Hypothyroidism on replacement therapy.  4. Hypertension, controlled .  5. Shortness of breath, mostly due to deconditioning.      Plan  recommendations    Overall she is doing well from the electrophysiology standpoint.  No recurrence of atrial arrhythmias.  Continue with current medical therapy that includes beta-blockers.    Follow my office every year or sooner if needed.    Risk factor modification and lifestyle modification discussed with patient. Diet , exercise and hydration discussed with patient.    I have personally review with patient during this office visit, laboratory data, echocardiogram results, stress test results, Holter-event monitor results prior and after the last electrophysiology visit. All questions has been answered.    Please excuse any errors in grammar or translation related to this dictation.  Voice recognition software was utilized to prepare this document.

## 2024-06-24 ENCOUNTER — TELEPHONE (OUTPATIENT)
Dept: PRIMARY CARE | Facility: CLINIC | Age: 69
End: 2024-06-24
Payer: MEDICARE

## 2024-06-24 DIAGNOSIS — M06.9 RHEUMATOID ARTHRITIS, INVOLVING UNSPECIFIED SITE, UNSPECIFIED WHETHER RHEUMATOID FACTOR PRESENT (MULTI): ICD-10-CM

## 2024-06-24 PROCEDURE — RXMED WILLOW AMBULATORY MEDICATION CHARGE

## 2024-06-24 RX ORDER — CELECOXIB 200 MG/1
200 CAPSULE ORAL
Qty: 180 CAPSULE | Refills: 0 | Status: SHIPPED | OUTPATIENT
Start: 2024-06-24

## 2024-06-24 NOTE — TELEPHONE ENCOUNTER
Pt calling, she states that the Ozempic is too expensive. She wanted to know if she can have a sample of this?    Please advise

## 2024-06-26 ENCOUNTER — OFFICE VISIT (OUTPATIENT)
Dept: PAIN MANAGEMENT | Age: 69
End: 2024-06-26
Payer: MEDICARE

## 2024-06-26 VITALS
BODY MASS INDEX: 39.37 KG/M2 | WEIGHT: 245 LBS | DIASTOLIC BLOOD PRESSURE: 74 MMHG | HEIGHT: 66 IN | SYSTOLIC BLOOD PRESSURE: 134 MMHG

## 2024-06-26 DIAGNOSIS — M48.061 LUMBAR FORAMINAL STENOSIS: ICD-10-CM

## 2024-06-26 DIAGNOSIS — M25.552 LEFT HIP PAIN: Primary | ICD-10-CM

## 2024-06-26 DIAGNOSIS — M46.1 BILATERAL SACROILIITIS (HCC): ICD-10-CM

## 2024-06-26 DIAGNOSIS — M06.9 RHEUMATOID ARTHRITIS, INVOLVING UNSPECIFIED SITE, UNSPECIFIED WHETHER RHEUMATOID FACTOR PRESENT (HCC): ICD-10-CM

## 2024-06-26 PROCEDURE — 3078F DIAST BP <80 MM HG: CPT | Performed by: NURSE PRACTITIONER

## 2024-06-26 PROCEDURE — 99214 OFFICE O/P EST MOD 30 MIN: CPT | Performed by: NURSE PRACTITIONER

## 2024-06-26 PROCEDURE — 1123F ACP DISCUSS/DSCN MKR DOCD: CPT | Performed by: NURSE PRACTITIONER

## 2024-06-26 PROCEDURE — 3075F SYST BP GE 130 - 139MM HG: CPT | Performed by: NURSE PRACTITIONER

## 2024-06-26 ASSESSMENT — ENCOUNTER SYMPTOMS
BACK PAIN: 1
CONSTIPATION: 0
RESPIRATORY NEGATIVE: 1
DIARRHEA: 0

## 2024-06-26 NOTE — PROGRESS NOTES
Systems   Constitutional: Negative.  Negative for activity change and unexpected weight change.   HENT: Negative.  Negative for hearing loss.    Respiratory: Negative.     Cardiovascular:  Negative for leg swelling.   Gastrointestinal:  Negative for constipation and diarrhea.   Genitourinary: Negative.    Musculoskeletal:  Positive for arthralgias and back pain. Negative for gait problem, joint swelling and neck pain.   Skin:  Negative for rash.   Neurological:  Negative for dizziness, weakness, numbness and headaches.   Psychiatric/Behavioral: Negative.  Negative for sleep disturbance.          Objective:     Vitals:  /74 (Site: Left Upper Arm, Position: Sitting)   Ht 1.676 m (5' 6\")   Wt 111.1 kg (245 lb)   BMI 39.54 kg/m² Pain Score:   9    Physical Exam  Vitals reviewed.   Constitutional:       Appearance: She is well-developed.   HENT:      Head: Normocephalic.      Nose: Nose normal.   Pulmonary:      Effort: Pulmonary effort is normal.   Musculoskeletal:      Cervical back: Normal range of motion and neck supple.      Lumbar back: Tenderness present. Decreased range of motion. Negative right straight leg raise test and negative left straight leg raise test.      Left hip: No tenderness. Decreased range of motion.      Comments: Groin pain to left with hip adduction   Lymphadenopathy:      Cervical: No cervical adenopathy.   Skin:     General: Skin is warm and dry.      Findings: No erythema or rash.   Neurological:      Mental Status: She is alert and oriented to person, place, and time.      Cranial Nerves: No cranial nerve deficit.      Deep Tendon Reflexes: Reflexes are normal and symmetric. Reflexes normal.   Psychiatric:         Mood and Affect: Mood normal.         Behavior: Behavior normal.         Thought Content: Thought content normal.         Judgment: Judgment normal.          Assessment:        Diagnosis Orders   1. Left hip pain  XR HIP LEFT (2-3 VIEWS)      2. Rheumatoid arthritis,

## 2024-06-27 ENCOUNTER — PHARMACY VISIT (OUTPATIENT)
Dept: PHARMACY | Facility: CLINIC | Age: 69
End: 2024-06-27
Payer: MEDICARE

## 2024-06-28 ENCOUNTER — APPOINTMENT (OUTPATIENT)
Dept: PHARMACY | Facility: HOSPITAL | Age: 69
End: 2024-06-28
Payer: MEDICARE

## 2024-07-03 ENCOUNTER — PHARMACY VISIT (OUTPATIENT)
Dept: PHARMACY | Facility: CLINIC | Age: 69
End: 2024-07-03
Payer: MEDICARE

## 2024-07-03 PROCEDURE — RXMED WILLOW AMBULATORY MEDICATION CHARGE

## 2024-07-09 ENCOUNTER — APPOINTMENT (OUTPATIENT)
Dept: PHARMACY | Facility: HOSPITAL | Age: 69
End: 2024-07-09
Payer: MEDICARE

## 2024-07-09 DIAGNOSIS — E11.9 TYPE 2 DIABETES MELLITUS WITHOUT COMPLICATION, WITHOUT LONG-TERM CURRENT USE OF INSULIN (MULTI): ICD-10-CM

## 2024-07-09 DIAGNOSIS — E11.65 TYPE 2 DIABETES MELLITUS WITH HYPERGLYCEMIA, WITHOUT LONG-TERM CURRENT USE OF INSULIN (MULTI): Primary | ICD-10-CM

## 2024-07-09 NOTE — PROGRESS NOTES
Patient ID: Marianna Buck is a 68 y.o. female who presents for Diabetes Pt is here for follow-up appointment.    Referring Provider: Tom Bingham DO   Last visit with PCP: 5.1.24  Next appt: Not scheduled, encouraged    Verbal consent to manage patient's drug therapy was obtained from patient. They were informed they may decline to participate or withdraw from participation in pharmacy services at any time. Patient is agreeable to detailed voice messages if unable to be reached.       Subjective   Treatment Adherence:   Patient did take medications today.   Number of missed doses in last 7 days: 0.  Why? 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 pepper      HPI  DIABETES MELLITUS TYPE 2:  Diagnosed with diabetes:  >1 yr. Known diabetic complications: none. Optometry exam current (within 1year):  No, not found in chart review  Most recent visit in Podiatry was on - 6.24.24     Current diabetic medications include:  Ozempic 2mg weekly    Patient confirms above regimen.     Home blood glucose records (mg/dL) - report uploaded to chart  DEXCOM G7 -  Sensor Capture   14 day capture rate = 86%   Average Blood Sugars  14 day average =  132  30 day average = 144   Time in target (14 days)  Very High >250 = 0%  High         181-250 = 3%  Target       = 96%  Low          54-69 = 0%  Very Low  <54 = 0%   Number of Low Events (14 days)  #  of times BG < 70mg/dL = 0       Any episodes of hypoglycemia? Yes;  patient reports a few episodes which she attributes to extended time between meals.  Did patient treat episode of hypoglycemia appropriately? Yes,   Attributes lows to forgetting to eat  Does pt have proteinuria? No, NONE FOUND  If appropriate, is patient on ACEi/ARB? N/A    Secondary Prevention  Statin? No,   .  ACE-I/ARB? N/A.  Aspirin? Yes, 81mg daily .    Pertinent PMH Review:  PMH of Pancreatitis: No  PMH of Retinopathy: No  PMH of Urinary Tract Infections: Yes  PMH of MTC: No      Review of Systems    Objective     There were no vitals taken for this visit.     Labs  Lab Results   Component Value Date    BILITOT 0.5 01/31/2024    CALCIUM 9.3 01/31/2024    CO2 29 01/31/2024    CL 98 01/31/2024    CREATININE 0.98 01/31/2024    GLUCOSE 110 (H) 01/31/2024    ALKPHOS 68 01/31/2024    K 4.6 01/31/2024    PROT 7.4 01/31/2024     01/31/2024    AST 33 01/31/2024    ALT 32 01/31/2024    BUN 17 01/31/2024    ANIONGAP 17 01/31/2024    MG 2.00 06/04/2020    ALBUMIN 3.9 01/31/2024    LIPASE 55 09/29/2018    GFRF 88 03/31/2023     Lab Results   Component Value Date    TRIG 264 (H) 01/31/2024    CHOL 157 01/31/2024    LDLCALC 64 01/31/2024    HDL 40.6 01/31/2024     Lab Results   Component Value Date    HGBA1C 6.5 05/01/2024       Current Outpatient Medications   Medication Instructions    alpha lipoic acid 200 mg capsule Take 1 capsule by mouth two times a day.    aspirin 81 mg, oral    blood-glucose sensor (Dexcom G7 Sensor) device Place one sensor every 10 days on back of arm to check sugars.    celecoxib (CELEBREX) 200 mg, oral, 2 times daily Nitrate    docusate sodium (COLACE) 100 mg, oral, 2 times daily    DULoxetine (CYMBALTA) 60 mg, oral,  Daily    estradiol (ESTRACE) 0.5 mg, oral, Daily    hydroxychloroquine (Plaquenil) 200 mg tablet Take 1 tablet (200 mg) by mouth in the morning and in the evening.    levothyroxine (SYNTHROID, LEVOXYL) 50 mcg, oral, Daily    magnesium oxide (MAG-OX) 400 mg, oral, Daily    metoprolol succinate XL (TOPROL-XL) 25 mg, oral, Daily    metroNIDAZOLE (Metrogel) 1 % gel 1 Application, Topical, Daily, APPLY SPARINGLY TO AFFECTED AREA(S) ONCE DAILY    omeprazole (PRILOSEC) 40 mg, oral, Daily before breakfast    pregabalin (Lyrica) 150 mg capsule Take 1 capsule (150 mg) by mouth 3 times a day. (In the moring, evening and before bed)    [START ON 7/14/2024] semaglutide 2 mg, subcutaneous, Once Weekly    traMADol (ULTRAM) 50 mg, oral, Every 6 hours PRN    triamterene-hydrochlorothiazid (Maxzide-25) 37.5-25 mg tablet TAKE 1 TABLET BY MOUTH EVERY MORNING         Assessment/Plan   Problem List Items Addressed This Visit             ICD-10-CM    Type 2 diabetes mellitus with hyperglycemia, without long-term current use of insulin (Multi) - Primary E11.65     Patient's goal A1c is < 7%.  Is pt at goal? Yes, 6.5% on 5.1.24  Patient's SMBGs are at goal.  Rationale for plan: Patient A1c below goal. Ozempic was increased to 2mg at last PCP appointment, and tolerating 2mg Ozempic well. Confirms stopping metformin. Is not having as many lows anymore. Reminded to keep meals on schedule or have small snack between meals.       Medication Changes:  CONTINUE  Ozempic 2mg weekly         Diabetes Education  Rule of 15: eating ~15 g of carbs when BG less than 80 (half cup juice, 3-4 glucose tabs).  Recognize symptoms of high and low blood sugar.   Eat a realistic healthy diet consisting of fruits, vegetables, fiber, protein food choices on a regular basis and be aware of portion/serving sizes. Reduce carbohydrate consumption and always consume with protein and fat. Avoid foods high in saturated/trans fat, high salt content, and sweets and  beverages with added sugars.  Limit alcohol consumption; alcohol may affect your blood sugar and cause hypoglycemia.   Stay active and incorporate ~30 mins of exercise into your daily routine to manage your weight and increase the body's acceptance of insulin.    Ozempic Education:   Counseled patient on Ozempic MOA, expectations, side effects, duration of therapy, administration, and monitoring parameters.  Provided detailed dosing and administration counseling to ensure proper technique.   Reviewed Ozempic titration schedule, starting with 0.25 mg once weekly for 4 weeks, then continuing on 0.5 mg once weekly. Patient verbalized understanding.  Counseled patient on the benefits of GLP-1ra, such as cardiovascular risk reduction, glycemic control, and weight loss potential.  Reviewed storage requirements of Ozempic when not in use, and when to administer the medication if a dose is missed.  Advised patient that they may experience improved satiety after meals and portion sizes of meals may be reduced as doses of Ozempic increase.  Counseled patient to avoid foods that are fatty/oily as this may precipitate the nausea/GI upset that may occur with new start Ozempic.          Relevant Medications    semaglutide 2 mg/dose (8 mg/3 mL) pen injector (Start on 7/14/2024)    Other Relevant Orders    Follow Up In [facility] - Pharmacy     Other Visit Diagnoses         Codes    Type 2 diabetes mellitus without complication, without long-term current use of insulin (Multi)     E11.9    Relevant Medications    semaglutide 2 mg/dose (8 mg/3 mL) pen injector (Start on 7/14/2024)          Statin therapy discussed for cardioprotection in setting of diabetes. LDL is at goal but TG are elevated. Patient states she would like to discuss at next PCP appt.    Follow up with Clinical Pharmacy Team 8/8/2024 at 12:00PM    Continue all meds under the continuation of care with the referring provider and clinical pharmacy  team.    Please reach out to the Clinical Pharmacy Team if there are any further questions.     Verbal consent to manage patient's drug therapy was obtained from patient. They were informed they may decline to participate or withdraw from participation in pharmacy services at any time.    Yue Kelly, PharmD  [phone]

## 2024-07-22 PROCEDURE — RXMED WILLOW AMBULATORY MEDICATION CHARGE

## 2024-07-24 ENCOUNTER — APPOINTMENT (OUTPATIENT)
Dept: PLASTIC SURGERY | Facility: CLINIC | Age: 69
End: 2024-07-24
Payer: MEDICARE

## 2024-07-25 PROCEDURE — RXMED WILLOW AMBULATORY MEDICATION CHARGE

## 2024-07-29 ENCOUNTER — PHARMACY VISIT (OUTPATIENT)
Dept: PHARMACY | Facility: CLINIC | Age: 69
End: 2024-07-29
Payer: COMMERCIAL

## 2024-08-08 ENCOUNTER — APPOINTMENT (OUTPATIENT)
Dept: PHARMACY | Facility: HOSPITAL | Age: 69
End: 2024-08-08
Payer: MEDICARE

## 2024-08-08 DIAGNOSIS — I10 HYPERTENSION, UNSPECIFIED TYPE: ICD-10-CM

## 2024-08-08 PROCEDURE — RXMED WILLOW AMBULATORY MEDICATION CHARGE

## 2024-08-08 RX ORDER — METOPROLOL SUCCINATE 25 MG/1
25 TABLET, EXTENDED RELEASE ORAL DAILY
Qty: 90 TABLET | Refills: 0 | Status: SHIPPED | OUTPATIENT
Start: 2024-08-08

## 2024-08-12 LAB — NONINV COLON CA DNA+OCC BLD SCRN STL QL: NORMAL

## 2024-08-12 NOTE — PROGRESS NOTES
Subjective   Patient ID: [id number]   Marianna Buck is a 68 y.o. 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 Management   No referrals     Current rheum meds:  - Celebrex 200 mg BID  -  mg BID around 30 years     Previous rheum meds:  - None     HPI  The patient reports seeing Dr. Holman around 30 years ago, diagnosed as rheumatoid arthritis, started on Plaquenil and has been on it since  She reports it helped her initially but now feels like she has more joint pains  She never went back to see Dr. Holman due to insurance issues and has been getting the Plaquenil from her PCP for the past 30 years  Today she is presenting for joint pains  Having eye exams twice a year   Feels like the HCQ is not working anymore   Left hip is painful   Elbows, shoulders hurt   Back bothers her   Pains worse with activity   No swelling  Hands feel stiff in the morning, for around 2 hours   Myalgia all over, has a diagnosis of fibromyalgia, follows with PCP and on Lyrica and Cymbalta, help her sleep and with pains   Pain management for the back, did injections, that helped     + moderate stress  + moderate depression   + Abd pain and bloating  + constipations alternating with diarrhea   + sleep better with being on Cymbalta and Lyrica by her PCP for fibromyalgia  + fatigue   + Dry mouth  + Newly diagnosed melanoma, had 1 surgery, plan for the second 1 tomorrow with skin graft  + Diffuse myalgia    PSH: Left knee replacement, carpal tunnel surgery, wrist surgery  Allergies: Per EMR  Habits: Quit smoking in 1986, no alcohol, no recreational drugs   Social hx: , 4 children    ROS:  Constitutional: Denies fever, chills, weight loss, night sweats or headaches  Eyes: Denies dry eyes, blurry vision, redness or pain  ENT: Denies dental loss, loss of taste, nasal or oral ulcers, jaw  claudication, difficulty swallowing, nasal crusting or recurrent sinus infections   Cardiovascular: Denies chest pain, palpitations, orthopnea  Respiratory: Denies shortness of breath, cough, asthma, or recurrent respiratory infections  Genitourinary: No recurrent urinary infections or STDs, no genital or anal ulcers  Integumentary: Denies photosensitivity, rash or lesions, Raynaud's phenomenon, skin tightening, digital ulcers, psoriatic lesions, or alopecia  Neurological: Denies any numbness or tingling, muscle weakness, or incontinence   Hematologic/Lymphatic: Denies bleeding, bruising, history of clots (arterial or venous), or abortions/miscarriages/pregnancy complications   MSK: No inflammatory back pain, enthesitis, dactylitis  Muscular: Denies weakness, difficulty rising from chair or combing the hair, muscle aches, or problems with hand    FHx: No family history of autoimmune diseases     Patient Active Problem List   Diagnosis    Bilateral knee pain    Cellulitis    Calf pain    Cellulitis of left lower extremity    Cervical strain    Chronic angle-closure glaucoma of left eye, mild stage    Chronic back pain    Class 3 severe obesity due to excess calories with serious comorbidity and body mass index (BMI) of 40.0 to 44.9 in adult (Multi)    Cornea guttata    Degeneration of intervertebral disc of lumbosacral region    Degenerative arthritis of cervical spine    Dermatochalasis of both eyelids    Type 2 diabetes mellitus with hyperglycemia, without long-term current use of insulin (Multi)    Epiretinal membrane (ERM) of left eye    Epiretinal membrane (ERM) of right eye    Eyelid twitch    HTN (hypertension)    Gastrocnemius muscle rupture, unspecified laterality, subsequent encounter    Gastrocnemius strain    Fibromyalgia    Headache    Hyperglycemia    Hypothyroidism    Knee pain, right    Left knee DJD    GERD (gastroesophageal reflux disease)    Leg pain    Swelling of lower leg    Lumbar stenosis  with neurogenic claudication    Map-dot-fingerprint corneal dystrophy    Lumbar pain    Mitral valve regurgitation    Morbid obesity with body mass index (BMI) of 40.0 or higher (Multi)    Neck pain    Neck strain, subsequent encounter    Nuclear sclerotic cataract of both eyes    Osteoarthritis of knees, bilateral    Pain in buttock    Pain, hip    PVD (posterior vitreous detachment), right eye    Paroxysmal SVT (supraventricular tachycardia) ([facility])    Rheumatoid arthritis (Multi)    Right foot strain    Residual stage of angle-closure glaucoma, bilateral    Vitreomacular traction syndrome of left eye    Right knee DJD    Rosacea    Knee joint replacement status    Sciatica of right side associated with disorder of lumbar spine    Shortness of breath    Sore throat    Strain of groin, right, initial encounter    Strain of trapezius muscle, subsequent encounter    Sweating increase    Swelling of both hands    Tricuspid valve regurgitation    UTI (urinary tract infection)    Urinary frequency    Ventral hernia without obstruction or gangrene    Vitamin D deficiency    URI, acute    BMI 40.0-44.9, adult (Multi)    Medicare annual wellness visit, subsequent    BMI 39.0-39.9,adult    Class 2 obesity due to excess calories without serious comorbidity with body mass index (BMI) of 39.0 to 39.9 in adult    Former smoker     Past Medical History:   Diagnosis Date    Personal history of other diseases of the circulatory system     History of paroxysmal supraventricular tachycardia    Personal history of other diseases of the circulatory system     History of hypertension    Personal history of other diseases of the musculoskeletal system and connective tissue     History of rheumatoid arthritis    Personal history of other malignant neoplasm of skin     History of malignant neoplasm of skin     Past Surgical History:   Procedure Laterality Date    CARPAL TUNNEL RELEASE  08/17/2016    Neuroplasty Decompression Median  Nerve At Carpal Tunnel    CHOLECYSTECTOMY  2017    Cholecystectomy Laparoscopic    COLONOSCOPY  2016    Colonoscopy (Fiberoptic)    EYE SURGERY  2017    Eye Surgery    HEMORRHOID SURGERY  2016    Hemorrhoidectomy    HYSTERECTOMY  2016    Hysterectomy    OTHER SURGICAL HISTORY  2016    Anal Fissurectomy    OTHER SURGICAL HISTORY  10/28/2022    Hernia repair    OTHER SURGICAL HISTORY  10/28/2022    Wrist surgery    OTHER SURGICAL HISTORY  10/28/2022    Electrophysiology study    OTHER SURGICAL HISTORY  10/28/2022    Skin lesion excision    OTHER SURGICAL HISTORY  2022    Knee replacement    OTHER SURGICAL HISTORY  2017    Iridotomy By YAG Laser    OTHER SURGICAL HISTORY  2019    Cardioversion    US ASPIRATION INJECTION INTERMEDIATE JOINT  2021    US ASPIRATION INJECTION INTERMEDIATE JOINT 2021 [facility]    US ASPIRATION INJECTION INTERMEDIATE JOINT  10/15/2021    US ASPIRATION INJECTION INTERMEDIATE JOINT 10/15/2021 [facility]    US ASPIRATION INJECTION INTERMEDIATE JOINT  11/3/2021    US ASPIRATION INJECTION INTERMEDIATE JOINT 11/3/2021 [facility]     Social History     Socioeconomic History    Marital status:      Spouse name: Not on file    Number of children: Not on file    Years of education: Not on file    Highest education level: Not on file   Occupational History    Not on file   Tobacco Use    Smoking status: Former     Current packs/day: 0.00     Types: Cigarettes     Quit date:      Years since quittin.6    Smokeless tobacco: Never   Vaping Use    Vaping status: Never Used   Substance and Sexual Activity    Alcohol use: Never    Drug use: Never    Sexual activity: Defer   Other Topics Concern    Not on file   Social History Narrative    Not on file     Social Determinants of Health     Financial Resource Strain: Not on file   Food Insecurity: Not on file   Transportation Needs: Not on file  "  Physical Activity: Not on file   Stress: Not on file   Social Connections: Not on file   Intimate Partner Violence: Not on file   Housing Stability: Not on file     Allergies   Allergen Reactions    Oxycodone-Acetaminophen Other and Unknown     :\"sensitivity\"    Sulfa (Sulfonamide Antibiotics) Other     Adverse reaction    Codeine Rash     Current Outpatient Medications:     aspirin 81 mg EC tablet, Take 1 tablet (81 mg) by mouth., Disp: , Rfl:     blood-glucose sensor (Dexcom G7 Sensor) device, Place one sensor every 10 days on back of arm to check sugars., Disp: 3 each, Rfl: 11    celecoxib (CeleBREX) 200 mg capsule, Take 1 capsule (200 mg) by mouth in the morning and at bedtime., Disp: 180 capsule, Rfl: 0    cephalexin (Keflex) 500 mg capsule, Take 1 capsule (500 mg) by mouth every 6 hours. Take as directed after surgery until the medication is finished. (Patient not taking: Reported on 8/16/2024), Disp: 28 capsule, Rfl: 0    docusate sodium (Colace) 100 mg capsule, Take 1 capsule (100 mg) by mouth 2 times a day., Disp: , Rfl:     DULoxetine (Cymbalta) 60 mg DR capsule, Take 1 capsule (60 mg) by mouth once daily., Disp: 90 capsule, Rfl: 1    estradiol (Estrace) 0.5 mg tablet, Take 1 tablet (0.5 mg) by mouth once daily., Disp: 90 tablet, Rfl: 1    HYDROcodone-acetaminophen (Norco) 5-325 mg tablet, Take 1-2 tablets by mouth every 6 hours if needed for postoperative pain., Disp: 40 tablet, Rfl: 0    hydroxychloroquine (Plaquenil) 200 mg tablet, Take 1 tablet (200 mg) by mouth in the morning and in the evening., Disp: 180 tablet, Rfl: 1    levothyroxine (Synthroid, Levoxyl) 50 mcg tablet, Take 1 tablet (50 mcg) by mouth once daily., Disp: 90 tablet, Rfl: 1    magnesium oxide (Mag-Ox) 400 mg tablet, Take 1 tablet (400 mg) by mouth once daily., Disp: , Rfl:     metoprolol succinate XL (Toprol-XL) 25 mg 24 hr tablet, Take 1 tablet (25 mg) by mouth once daily., Disp: 90 tablet, Rfl: 0    metroNIDAZOLE (Metrogel) 1 % " gel, Apply 1 Application topically once daily. APPLY SPARINGLY TO AFFECTED AREA(S) ONCE DAILY, Disp: , Rfl:     nitroglycerin (Nitro-Bid) 2 % ointment, Apply thin layer to the skin twice a day as directed., Disp: 30 g, Rfl: 0    omeprazole (PriLOSEC) 40 mg DR capsule, Take 1 capsule (40 mg) by mouth once daily in the morning. Take before meals., Disp: 90 capsule, Rfl: 0    pregabalin (Lyrica) 150 mg capsule, Take 1 capsule (150 mg) by mouth 3 times a day (in the morning, evening, and before bed)., Disp: 270 capsule, Rfl: 1    semaglutide 2 mg/dose (8 mg/3 mL) pen injector, Inject 2 mg under the skin 1 (one) time per week., Disp: 3 mL, Rfl: 5    traMADol (Ultram) 50 mg tablet, Take 1 tablet (50 mg) by mouth every 6 hours if needed for severe pain (7 - 10)., Disp: 120 tablet, Rfl: 2    traMADol (Ultram) 50 mg tablet, Take 1 tablet (50 mg) by mouth every 6 hours if needed for severe pain (7 - 10)., Disp: 120 tablet, Rfl: 2    triamterene-hydrochlorothiazid (Maxzide-25) 37.5-25 mg tablet, TAKE 1 TABLET BY MOUTH EVERY MORNING, Disp: 90 tablet, Rfl: 1     Objective   Visit Vitals  /65   Pulse 97   Temp 37.1 °C (98.7 °F)   Resp 20   Wt 110 kg (243 lb)   BMI 39.22 kg/m²   OB Status Postmenopausal   Smoking Status Former   BSA 2.26 m²     Physical Exam:  General: AAOx3, Cooperative  Head: normocephalic, atraumatic, no hair loss, wound dressing over her mid forehead and nose from skin surgery  Eyes: EOMI, conjunctiva clear, sclera white, anicteric  Ears: hearing intact  Nose: no deformity, no crusting   Throat/Mouth: No oral deformities, no cheek swelling, mucosa appear moist, no oral ulcers noted or loss of dentition   Neck/Lymph: FROM, trachea midline  Lungs: chest expansion symmetric, clear to auscultation bilaterally. No wheezing, rhonchi, or stridor  Heart: S1, S2, RRR. No murmur or rub  Abdomen: Soft, non-tender without masses  Skin: No rashes, ulcers or photosensitive areas  MSK: Upper Extremities:  Hand/Fingers:  Puffiness over the space between the right second and third MCP with overlying wound from her dog.  No erythema, tenderness or warmth at DIP, PIP, or MCP joints, FROM grossly. Good hand . No nodules.  Heberden's nodes  Wrists: No erythema, edema, warmth or tenderness at wrist, FROM grossly  Elbows: No tenderness, edema, erythema or warmth at elbows, FROM grossly. No nodules   Shoulders: No edema, erythema, tenderness or warmth at shoulders. FROM  Lower Extremities:   Hips: No obvious deformities. No joint tenderness, normal ROM grossly. Log roll test positive on the left.  Wilmar test is positive on the left.  Left trochanteric bursae TTP  Knees: Scar of left knee replacement surgery.  Some tenderness, no deformities, edema, rashes, or warmth, normal ROM grossly. No crepitus, no pes anserine bursa TTP   Ankles, feet: No deformities, tenderness, edema, erythema, ulceration, or warmth at the ankle or MTP/IP joints, normal ROM grossly  Spine: Lower lumbar spinal tenderness to palpation. Right SI joint tenderness    Lab Results   Component Value Date    WBC 4.9 01/31/2024    HGB 12.1 01/31/2024    HCT 38.6 01/31/2024    MCV 80 01/31/2024     01/31/2024        Chemistry    Lab Results   Component Value Date/Time     01/31/2024 1146    K 4.6 01/31/2024 1146    CL 98 01/31/2024 1146    CO2 29 01/31/2024 1146    BUN 17 01/31/2024 1146    CREATININE 0.98 01/31/2024 1146    Lab Results   Component Value Date/Time    CALCIUM 9.3 01/31/2024 1146    ALKPHOS 68 01/31/2024 1146    AST 33 01/31/2024 1146    ALT 32 01/31/2024 1146    BILITOT 0.5 01/31/2024 1146        Lab Results   Component Value Date    CRP 0.94 06/05/2020     Lab Results   Component Value Date    NEUTROABS 2.57 01/31/2024      Lab Results   Component Value Date    FERRITIN 24 06/05/2020     Lab Results   Component Value Date    ALT 32 01/31/2024    AST 33 01/31/2024    ALKPHOS 68 01/31/2024    BILITOT 0.5 01/31/2024     Lab Results   Component  Value Date    CALCIUM 9.3 01/31/2024      ECG 12 lead (Clinic Performed)  EKG performed today shows sinus rhythm at a rate of 67 bpm QRS duration   102 ms QT corrected 450 ms.  Rhythm strip shows the same pattern.     === 08/18/23 ===  Left knee xray: table appearing postoperative   total left knee arthroplasty with no obvious presence for acute   fracture or dislocation. Anterolateral soft tissue swelling in the   soft tissues noted. No obvious evidence for hardware malfunction or   displacement     MR L spine 6/23:  1. No fracture or bony destructive lesion  2. Mild-to-moderate central canal stenosis at L2-3  3.  Multilevel neural foraminal stenoses, worst (severe) at the left L4-5 level  4. Moderate to severe neural foraminal stenoses at the left L3-4 and right L5-S1 levels       Assessment/Plan    This is a  68 y.o. 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  No referrals     The patient has a questionable diagnosis of rheumatoid arthritis around very years ago, has not followed up with rheumatology since.  She has been on Plaquenil for around 30 years, does yearly eye exams.  The patient's current symptoms are more mechanical in nature, although she does report some stiffness in her hands of around 2 hours to complete loosen up and some swelling in her second/third right MCPs.  No other synovitis on exam.  No serologies.  Will do the workup.  Patient has OA in most of her joints, will do imaging of the rest.    Labs:  1/24: CBC, CMP, TSH, FT4, Ucrt wnl  No serologies    Imaging:  Refer to imaging above    # RA?  # OA of the C and L spine  # OA of the knees     - Labs today  - Xrays today  - Keep HCQ for now  - Follow up with PCP for fibromyalgia   - I will inform the patient of any urgent results, if any    RTC in 1 month for discussion of results     Plan, including risks and  benefits, was discussed with the patient, informed on how to reach us.     To schedule an appointment, call [phone]  To reach the rheumatology office, call [phone]    Zofia Elizabeth MD      Division of Rheumatology  [facility]

## 2024-08-13 PROCEDURE — RXMED WILLOW AMBULATORY MEDICATION CHARGE

## 2024-08-14 ENCOUNTER — APPOINTMENT (OUTPATIENT)
Dept: PHARMACY | Facility: HOSPITAL | Age: 69
End: 2024-08-14
Payer: MEDICARE

## 2024-08-15 ENCOUNTER — HOSPITAL ENCOUNTER (OUTPATIENT)
Dept: PREADMISSION TESTING | Age: 69
Discharge: HOME | End: 2024-08-19
Payer: MEDICARE

## 2024-08-15 VITALS
HEART RATE: 73 BPM | HEIGHT: 64 IN | DIASTOLIC BLOOD PRESSURE: 69 MMHG | TEMPERATURE: 97.6 F | BODY MASS INDEX: 42.99 KG/M2 | WEIGHT: 251.8 LBS | SYSTOLIC BLOOD PRESSURE: 159 MMHG | OXYGEN SATURATION: 97 % | RESPIRATION RATE: 18 BRPM

## 2024-08-15 DIAGNOSIS — S01.20XA OPEN WOUND OF NOSE, UNSPECIFIED OPEN WOUND TYPE, INITIAL ENCOUNTER: Primary | ICD-10-CM

## 2024-08-15 PROBLEM — I47.10 PAROXYSMAL SVT (SUPRAVENTRICULAR TACHYCARDIA) (HCC): Status: ACTIVE | Noted: 2022-12-04

## 2024-08-15 PROBLEM — I34.0 MITRAL VALVE REGURGITATION: Status: ACTIVE | Noted: 2023-03-21

## 2024-08-15 PROBLEM — E11.65 TYPE 2 DIABETES MELLITUS WITH HYPERGLYCEMIA, WITHOUT LONG-TERM CURRENT USE OF INSULIN (HCC): Status: ACTIVE | Noted: 2023-03-21

## 2024-08-15 PROBLEM — E03.9 HYPOTHYROIDISM: Status: ACTIVE | Noted: 2023-03-21

## 2024-08-15 PROBLEM — E55.9 VITAMIN D DEFICIENCY: Status: ACTIVE | Noted: 2023-03-21

## 2024-08-15 PROBLEM — E66.09 CLASS 2 OBESITY DUE TO EXCESS CALORIES WITHOUT SERIOUS COMORBIDITY WITH BODY MASS INDEX (BMI) OF 39.0 TO 39.9 IN ADULT: Status: ACTIVE | Noted: 2024-05-01

## 2024-08-15 PROBLEM — Z87.891 FORMER SMOKER: Status: ACTIVE | Noted: 2024-06-14

## 2024-08-15 PROBLEM — K21.9 GERD (GASTROESOPHAGEAL REFLUX DISEASE): Status: ACTIVE | Noted: 2023-03-21

## 2024-08-15 PROBLEM — Z96.659 KNEE JOINT REPLACEMENT STATUS: Status: ACTIVE | Noted: 2023-03-21

## 2024-08-15 PROBLEM — I07.1 TRICUSPID VALVE REGURGITATION: Status: ACTIVE | Noted: 2023-03-21

## 2024-08-15 PROBLEM — E66.812 CLASS 2 OBESITY DUE TO EXCESS CALORIES WITHOUT SERIOUS COMORBIDITY WITH BODY MASS INDEX (BMI) OF 39.0 TO 39.9 IN ADULT: Status: ACTIVE | Noted: 2024-05-01

## 2024-08-15 LAB
ANION GAP SERPL CALCULATED.3IONS-SCNC: 8 MEQ/L (ref 9–15)
BUN SERPL-MCNC: 12 MG/DL (ref 8–23)
CALCIUM SERPL-MCNC: 8.8 MG/DL (ref 8.5–9.9)
CHLORIDE SERPL-SCNC: 101 MEQ/L (ref 95–107)
CO2 SERPL-SCNC: 30 MEQ/L (ref 20–31)
CREAT SERPL-MCNC: 0.76 MG/DL (ref 0.5–0.9)
ESTIMATED AVERAGE GLUCOSE: 126 MG/DL
GLUCOSE SERPL-MCNC: 113 MG/DL (ref 70–99)
HBA1C MFR BLD: 6 % (ref 4–6)
POTASSIUM SERPL-SCNC: 4.4 MEQ/L (ref 3.4–4.9)
SODIUM SERPL-SCNC: 139 MEQ/L (ref 135–144)

## 2024-08-15 PROCEDURE — 80048 BASIC METABOLIC PNL TOTAL CA: CPT

## 2024-08-15 PROCEDURE — 83036 HEMOGLOBIN GLYCOSYLATED A1C: CPT

## 2024-08-15 RX ORDER — SODIUM CHLORIDE 0.9 % (FLUSH) 0.9 %
5-40 SYRINGE (ML) INJECTION EVERY 12 HOURS SCHEDULED
Status: CANCELLED | OUTPATIENT
Start: 2024-08-21

## 2024-08-15 RX ORDER — NITROGLYCERIN 20 MG/G
OINTMENT TOPICAL
COMMUNITY
Start: 2024-07-25

## 2024-08-15 RX ORDER — OMEGA-3S/DHA/EPA/FISH OIL/D3 300MG-1000
400 CAPSULE ORAL DAILY
COMMUNITY

## 2024-08-15 RX ORDER — SODIUM CHLORIDE 0.9 % (FLUSH) 0.9 %
5-40 SYRINGE (ML) INJECTION PRN
Status: CANCELLED | OUTPATIENT
Start: 2024-08-21

## 2024-08-15 RX ORDER — ACYCLOVIR 400 MG/1
TABLET ORAL
COMMUNITY
Start: 2024-06-24

## 2024-08-15 RX ORDER — TRAMADOL HYDROCHLORIDE 50 MG/1
TABLET ORAL
Status: ON HOLD | COMMUNITY
Start: 2024-06-24 | End: 2024-08-21 | Stop reason: HOSPADM

## 2024-08-15 RX ORDER — SODIUM CHLORIDE 9 MG/ML
INJECTION, SOLUTION INTRAVENOUS PRN
Status: CANCELLED | OUTPATIENT
Start: 2024-08-21

## 2024-08-15 RX ORDER — LIDOCAINE HYDROCHLORIDE 10 MG/ML
1 INJECTION, SOLUTION EPIDURAL; INFILTRATION; INTRACAUDAL; PERINEURAL
Status: CANCELLED | OUTPATIENT
Start: 2024-08-21 | End: 2024-08-22

## 2024-08-15 RX ORDER — ASPIRIN 81 MG/1
81 TABLET ORAL DAILY
Status: ON HOLD | COMMUNITY
End: 2024-08-21 | Stop reason: HOSPADM

## 2024-08-15 NOTE — PROGRESS NOTES
Subjective   Patient ID: Marianna Buck is a 68 y.o. female who presents for Groin Pain and Pain.    HPI    Patient presents today for pain follow up. Rates the pain a 8/10 over the past 7 days. Reports that the medication gives 25% pain control/relief. OARRS reviewed today. Controlled substance agreement signed. Last took medication 08/16/20254.    Pt states she bent over and pulled the muscle by her groin.Pt states her pain is 8/10. Located between thigh and buttocks. Denies bruising.     Advanced Care Planning  Marianna Buck and I discussed their advance care plan preferences such as living will, and durable health care power of , which include:no Attempt Resuscitation, yes Intubate & Ventilate,yes Comfort Care or Life- Sustaning Care. I reminded patient to talk with their health care agent, beatrice, about their health care goals. Note reflects patient's free will and care goals as expressed to me.         Review of systems  ; Patient seen today for exam denies any problems with headaches or vision, denies any shortness of breath chest pain nausea or vomiting, no black stool no blood in the stool no heartburn type symptoms denies any problems with constipation or diarrhea, and no dysuria-type symptoms    The patient's allergies medications were reviewed with them today    The patient's social family and surgical history or also reviewed here today, along with her past medical history.     Objective     Alert and active in  no acute distress  HEENT TMs clear oropharynx negative nares clear no drainage noted neck supple  With no adenopathy   Heart regular rate and rhythm without murmur and no carotid bruits  Lungs- clear to auscultation bilaterally, no wheeze or rhonchi noted  Thyroid -negative masses or nodularity  Abdomen- soft times four quadrants , bowel sounds positive no masses or organomegaly, negative tenderness guarding or rebound  Neurological exam unremarkable- DTRs in upper and lower  "extremities within normal limits.   skin -no lesions noted      /72 (BP Location: Left arm, Patient Position: Sitting, BP Cuff Size: Adult)   Pulse 72   Ht 1.676 m (5' 6\")   SpO2 98%   BMI 40.19 kg/m²     Allergies   Allergen Reactions    Oxycodone-Acetaminophen Other and Unknown     :\"sensitivity\"    Sulfa (Sulfonamide Antibiotics) Other     Adverse reaction    Codeine Rash       Assessment/Plan   Problem List Items Addressed This Visit       Chronic back pain - Primary    Class 3 severe obesity due to excess calories with serious comorbidity and body mass index (BMI) of 40.0 to 44.9 in adult (Multi)    Fibromyalgia    Relevant Medications    pregabalin (Lyrica) 150 mg capsule    Osteoarthritis of knees, bilateral    BMI 40.0-44.9, adult (Multi)     Other Visit Diagnoses       Therapeutic drug monitoring        Relevant Orders    Opiate/Opioid/Benzo Prescription Compliance    OOB Internal Tracking    Chronic pain of both shoulders        Back pain, unspecified back location, unspecified back pain laterality, unspecified chronicity        Relevant Medications    traMADol (Ultram) 50 mg tablet    Left knee pain, unspecified chronicity        Relevant Medications    traMADol (Ultram) 50 mg tablet    Strain of left hamstring, initial encounter              Discussed patient's BMI and to institute calorie reduction and increase exercise to decrease risk of diabetes and heart disease in the future.    I personally reviewed the OARRS report for this patient. I have considered the risks of abuse, dependence, addiction, and diversion.  CSA 8/16/24  UDS 8/16/24    Refill Tramadol, Lyrica.    She refuses a statin today.     Recommend ice and heat for her hamstring.    Follow up in 3 months or sooner if necessary.     If anything worsens or changes please call us at once, follow up in the office as planned.    Scribe Attestation  By signing my name below, I, Jennifer Bland MA, Scribe   attest that this " documentation has been prepared under the direction and in the presence of Tom Bingham DO.

## 2024-08-15 NOTE — H&P
present.      Mental Status: She is alert and oriented to person, place, and time.      Motor: No weakness.      Gait: Gait abnormal.   Psychiatric:         Mood and Affect: Mood normal.         Behavior: Behavior normal.         Thought Content: Thought content normal.         Judgment: Judgment normal.          BP (!) 159/69   Pulse 73   Temp 97.6 °F (36.4 °C) (Temporal)   Resp 18   Ht 1.626 m (5' 4\")   Wt 114.2 kg (251 lb 12.8 oz)   SpO2 97%   BMI 43.22 kg/m²         ASSESSMENT  Patient Active Problem List   Diagnosis    RA (rheumatoid arthritis) ([facility])    HTN (hypertension)    Panic attacks    Hemorrhoids    Rosacea    Chronic back pain    Trochanteric bursitis of right hip    Lumbosacral spondylosis without myelopathy    Degeneration of lumbar intervertebral disc    Open wound of nose    Class 2 obesity due to excess calories without serious comorbidity with body mass index (BMI) of 39.0 to 39.9 in adult    Former smoker    GERD (gastroesophageal reflux disease)    Hypothyroidism    Knee joint replacement status    Mitral valve regurgitation    Tricuspid valve regurgitation    Paroxysmal SVT (supraventricular tachycardia) ([facility])    Type 2 diabetes mellitus with hyperglycemia, without long-term current use of insulin ([facility])    Vitamin D deficiency         Plan:  Preoperative workup as follows: PAT, BMP, HGB A1C (EKG 6/14/24 in Epic)  2.   Scheduled for: GRAFT SITE PREPARATION NOSE, TRANSPOSITION OF FOREHEAD FLAP TO NOSE, SPLIT-THICKNESS SKIN GRAFT TO NASAL FLAP, APPLICATION OF INTEGRA TO FOREHEAD on 8/21/24.    SIGNATURE: ANDRE Delgadillo - CNP  DATE: August 15, 2024

## 2024-08-16 ENCOUNTER — OFFICE VISIT (OUTPATIENT)
Dept: PRIMARY CARE | Facility: CLINIC | Age: 69
End: 2024-08-16
Payer: MEDICARE

## 2024-08-16 VITALS
DIASTOLIC BLOOD PRESSURE: 72 MMHG | HEIGHT: 66 IN | BODY MASS INDEX: 40.19 KG/M2 | OXYGEN SATURATION: 98 % | SYSTOLIC BLOOD PRESSURE: 126 MMHG | HEART RATE: 72 BPM

## 2024-08-16 DIAGNOSIS — M54.9 BACK PAIN, UNSPECIFIED BACK LOCATION, UNSPECIFIED BACK PAIN LATERALITY, UNSPECIFIED CHRONICITY: ICD-10-CM

## 2024-08-16 DIAGNOSIS — M25.512 CHRONIC PAIN OF BOTH SHOULDERS: ICD-10-CM

## 2024-08-16 DIAGNOSIS — M25.511 CHRONIC PAIN OF BOTH SHOULDERS: ICD-10-CM

## 2024-08-16 DIAGNOSIS — S76.312A STRAIN OF LEFT HAMSTRING, INITIAL ENCOUNTER: ICD-10-CM

## 2024-08-16 DIAGNOSIS — Z51.81 THERAPEUTIC DRUG MONITORING: ICD-10-CM

## 2024-08-16 DIAGNOSIS — G89.29 CHRONIC PAIN OF BOTH SHOULDERS: ICD-10-CM

## 2024-08-16 DIAGNOSIS — M54.50 CHRONIC LOW BACK PAIN, UNSPECIFIED BACK PAIN LATERALITY, UNSPECIFIED WHETHER SCIATICA PRESENT: Primary | ICD-10-CM

## 2024-08-16 DIAGNOSIS — G89.29 CHRONIC LOW BACK PAIN, UNSPECIFIED BACK PAIN LATERALITY, UNSPECIFIED WHETHER SCIATICA PRESENT: Primary | ICD-10-CM

## 2024-08-16 DIAGNOSIS — M17.0 OSTEOARTHRITIS OF BOTH KNEES, UNSPECIFIED OSTEOARTHRITIS TYPE: ICD-10-CM

## 2024-08-16 DIAGNOSIS — M25.562 LEFT KNEE PAIN, UNSPECIFIED CHRONICITY: ICD-10-CM

## 2024-08-16 DIAGNOSIS — M79.7 FIBROMYALGIA: ICD-10-CM

## 2024-08-16 DIAGNOSIS — E66.01 CLASS 3 SEVERE OBESITY DUE TO EXCESS CALORIES WITH SERIOUS COMORBIDITY AND BODY MASS INDEX (BMI) OF 40.0 TO 44.9 IN ADULT (MULTI): ICD-10-CM

## 2024-08-16 PROCEDURE — 80307 DRUG TEST PRSMV CHEM ANLYZR: CPT

## 2024-08-16 PROCEDURE — 3044F HG A1C LEVEL LT 7.0%: CPT | Performed by: FAMILY MEDICINE

## 2024-08-16 PROCEDURE — 82570 ASSAY OF URINE CREATININE: CPT

## 2024-08-16 PROCEDURE — 99214 OFFICE O/P EST MOD 30 MIN: CPT | Performed by: FAMILY MEDICINE

## 2024-08-16 PROCEDURE — 80365 DRUG SCREENING OXYCODONE: CPT

## 2024-08-16 PROCEDURE — 80346 BENZODIAZEPINES1-12: CPT

## 2024-08-16 PROCEDURE — 3074F SYST BP LT 130 MM HG: CPT | Performed by: FAMILY MEDICINE

## 2024-08-16 PROCEDURE — 80354 DRUG SCREENING FENTANYL: CPT

## 2024-08-16 PROCEDURE — 1158F ADVNC CARE PLAN TLK DOCD: CPT | Performed by: FAMILY MEDICINE

## 2024-08-16 PROCEDURE — 1036F TOBACCO NON-USER: CPT | Performed by: FAMILY MEDICINE

## 2024-08-16 PROCEDURE — 1159F MED LIST DOCD IN RCRD: CPT | Performed by: FAMILY MEDICINE

## 2024-08-16 PROCEDURE — 80361 OPIATES 1 OR MORE: CPT

## 2024-08-16 PROCEDURE — 80368 SEDATIVE HYPNOTICS: CPT

## 2024-08-16 PROCEDURE — 3062F POS MACROALBUMINURIA REV: CPT | Performed by: FAMILY MEDICINE

## 2024-08-16 PROCEDURE — 80358 DRUG SCREENING METHADONE: CPT

## 2024-08-16 PROCEDURE — 3078F DIAST BP <80 MM HG: CPT | Performed by: FAMILY MEDICINE

## 2024-08-16 PROCEDURE — 80373 DRUG SCREENING TRAMADOL: CPT

## 2024-08-16 PROCEDURE — RXMED WILLOW AMBULATORY MEDICATION CHARGE

## 2024-08-16 PROCEDURE — 3048F LDL-C <100 MG/DL: CPT | Performed by: FAMILY MEDICINE

## 2024-08-16 PROCEDURE — 1123F ACP DISCUSS/DSCN MKR DOCD: CPT | Performed by: FAMILY MEDICINE

## 2024-08-16 RX ORDER — TRAMADOL HYDROCHLORIDE 50 MG/1
50 TABLET ORAL EVERY 6 HOURS PRN
Qty: 120 TABLET | Refills: 2 | Status: SHIPPED | OUTPATIENT
Start: 2024-08-16

## 2024-08-16 RX ORDER — PREGABALIN 150 MG/1
150 CAPSULE ORAL 3 TIMES DAILY
Qty: 270 CAPSULE | Refills: 1 | Status: SHIPPED | OUTPATIENT
Start: 2024-08-16

## 2024-08-16 ASSESSMENT — ENCOUNTER SYMPTOMS
OCCASIONAL FEELINGS OF UNSTEADINESS: 0
LOSS OF SENSATION IN FEET: 0
DEPRESSION: 0

## 2024-08-17 ENCOUNTER — PHARMACY VISIT (OUTPATIENT)
Dept: PHARMACY | Facility: CLINIC | Age: 69
End: 2024-08-17
Payer: MEDICARE

## 2024-08-17 DIAGNOSIS — M06.9 RHEUMATOID ARTHRITIS, INVOLVING UNSPECIFIED SITE, UNSPECIFIED WHETHER RHEUMATOID FACTOR PRESENT (MULTI): Primary | ICD-10-CM

## 2024-08-17 LAB
AMPHETAMINES UR QL SCN: NORMAL
BARBITURATES UR QL SCN: NORMAL
BZE UR QL SCN: NORMAL
CANNABINOIDS UR QL SCN: NORMAL
CREAT UR-MCNC: 126 MG/DL (ref 20–320)
PCP UR QL SCN: NORMAL

## 2024-08-17 PROCEDURE — RXMED WILLOW AMBULATORY MEDICATION CHARGE

## 2024-08-19 PROCEDURE — RXMED WILLOW AMBULATORY MEDICATION CHARGE

## 2024-08-19 RX ORDER — HYDROXYCHLOROQUINE SULFATE 200 MG/1
200 TABLET, FILM COATED ORAL
Qty: 180 TABLET | Refills: 1 | Status: SHIPPED | OUTPATIENT
Start: 2024-08-19

## 2024-08-20 ENCOUNTER — HOSPITAL ENCOUNTER (OUTPATIENT)
Dept: RADIOLOGY | Facility: CLINIC | Age: 69
Discharge: HOME | End: 2024-08-20
Payer: MEDICARE

## 2024-08-20 ENCOUNTER — ANESTHESIA EVENT (OUTPATIENT)
Dept: OPERATING ROOM | Age: 69
End: 2024-08-20
Payer: MEDICARE

## 2024-08-20 ENCOUNTER — APPOINTMENT (OUTPATIENT)
Dept: RHEUMATOLOGY | Facility: CLINIC | Age: 69
End: 2024-08-20
Payer: MEDICARE

## 2024-08-20 VITALS
RESPIRATION RATE: 20 BRPM | WEIGHT: 243 LBS | DIASTOLIC BLOOD PRESSURE: 65 MMHG | SYSTOLIC BLOOD PRESSURE: 130 MMHG | BODY MASS INDEX: 39.22 KG/M2 | TEMPERATURE: 98.7 F | HEART RATE: 97 BPM

## 2024-08-20 DIAGNOSIS — M06.9 RHEUMATOID ARTHRITIS INVOLVING MULTIPLE SITES, UNSPECIFIED WHETHER RHEUMATOID FACTOR PRESENT (MULTI): ICD-10-CM

## 2024-08-20 DIAGNOSIS — M15.9 PRIMARY OSTEOARTHRITIS INVOLVING MULTIPLE JOINTS: Primary | ICD-10-CM

## 2024-08-20 DIAGNOSIS — E55.9 VITAMIN D DEFICIENCY: ICD-10-CM

## 2024-08-20 DIAGNOSIS — M15.9 PRIMARY OSTEOARTHRITIS INVOLVING MULTIPLE JOINTS: ICD-10-CM

## 2024-08-20 DIAGNOSIS — E03.9 ADULT HYPOTHYROIDISM: ICD-10-CM

## 2024-08-20 PROCEDURE — 3044F HG A1C LEVEL LT 7.0%: CPT | Performed by: STUDENT IN AN ORGANIZED HEALTH CARE EDUCATION/TRAINING PROGRAM

## 2024-08-20 PROCEDURE — 99204 OFFICE O/P NEW MOD 45 MIN: CPT | Performed by: STUDENT IN AN ORGANIZED HEALTH CARE EDUCATION/TRAINING PROGRAM

## 2024-08-20 PROCEDURE — 3060F POS MICROALBUMINURIA REV: CPT | Performed by: STUDENT IN AN ORGANIZED HEALTH CARE EDUCATION/TRAINING PROGRAM

## 2024-08-20 PROCEDURE — 73080 X-RAY EXAM OF ELBOW: CPT | Mod: BILATERAL PROCEDURE | Performed by: RADIOLOGY

## 2024-08-20 PROCEDURE — 1160F RVW MEDS BY RX/DR IN RCRD: CPT | Performed by: STUDENT IN AN ORGANIZED HEALTH CARE EDUCATION/TRAINING PROGRAM

## 2024-08-20 PROCEDURE — 73522 X-RAY EXAM HIPS BI 3-4 VIEWS: CPT

## 2024-08-20 PROCEDURE — 1159F MED LIST DOCD IN RCRD: CPT | Performed by: STUDENT IN AN ORGANIZED HEALTH CARE EDUCATION/TRAINING PROGRAM

## 2024-08-20 PROCEDURE — 1123F ACP DISCUSS/DSCN MKR DOCD: CPT | Performed by: STUDENT IN AN ORGANIZED HEALTH CARE EDUCATION/TRAINING PROGRAM

## 2024-08-20 PROCEDURE — 73522 X-RAY EXAM HIPS BI 3-4 VIEWS: CPT | Mod: BILATERAL PROCEDURE | Performed by: RADIOLOGY

## 2024-08-20 PROCEDURE — 3048F LDL-C <100 MG/DL: CPT | Performed by: STUDENT IN AN ORGANIZED HEALTH CARE EDUCATION/TRAINING PROGRAM

## 2024-08-20 PROCEDURE — 3075F SYST BP GE 130 - 139MM HG: CPT | Performed by: STUDENT IN AN ORGANIZED HEALTH CARE EDUCATION/TRAINING PROGRAM

## 2024-08-20 PROCEDURE — 73080 X-RAY EXAM OF ELBOW: CPT | Mod: 50

## 2024-08-20 PROCEDURE — 73030 X-RAY EXAM OF SHOULDER: CPT | Mod: 50

## 2024-08-20 PROCEDURE — 3078F DIAST BP <80 MM HG: CPT | Performed by: STUDENT IN AN ORGANIZED HEALTH CARE EDUCATION/TRAINING PROGRAM

## 2024-08-20 PROCEDURE — 1036F TOBACCO NON-USER: CPT | Performed by: STUDENT IN AN ORGANIZED HEALTH CARE EDUCATION/TRAINING PROGRAM

## 2024-08-20 PROCEDURE — 73030 X-RAY EXAM OF SHOULDER: CPT | Mod: BILATERAL PROCEDURE | Performed by: RADIOLOGY

## 2024-08-20 NOTE — ANESTHESIA PRE PROCEDURE
Department of Anesthesiology  Preprocedure Note       Name:  Martha Ellis   Age:  68 y.o.  :  1955                                          MRN:  [id number]         Date:  2024      Surgeon: Surgeon(s):  She Garibay MD    Procedure: Procedure(s):  GRAFT SITE PREPARATION NOSE, TRANSPOSITI ON OF FOREHEAD FLAP TO NOSE,SPLIT-THICKNESS SKIN GRAFT TO NASAL FLAP, APPLICATION OF INTEGRA TO FOREHEAD  WILL NEED DERMATONE MACHINE   e-mailed Saira AWARE    Medications prior to admission:   Prior to Admission medications    Medication Sig Start Date End Date Taking? Authorizing Provider   Continuous Glucose Sensor (DEXCOM G7 SENSOR) MISC  24  Yes Bonnie Ford MD   nitroglycerin (NITRO-BID) 2 % ointment Place onto the skin 24  Yes Provider, MD Bonnie   traMADol (ULTRAM) 50 MG tablet  24  Yes Provider, MD Bonnie   vitamin D3 (CHOLECALCIFEROL) 10 MCG (400 UNIT) TABS tablet Take 1 tablet by mouth daily 3 days-20 tablets; 4 days-10 tablets   Yes ProviderBonnie MD   aspirin 81 MG EC tablet Take 1 tablet by mouth daily   Yes Provider, MD Bonnie   levothyroxine (SYNTHROID) 50 MCG tablet TAKE ONE (1) TABLET BY MOUTH ONCE DAILY 3/15/19  Yes Sharif Cheung DO   celecoxib (CELEBREX) 200 MG capsule TAKE ONE (1) CAPSULE BY MOUTH TWO (2) TIMES A DAY 19  Yes Sharif Cheung DO   hydroxychloroquine (PLAQUENIL) 200 MG tablet TAKE ONE (1) TABLET BY MOUTH TWO (2) TIMES A DAY 19  Yes Elijah Smalls MD   metoprolol succinate (TOPROL XL) 25 MG extended release tablet TAKE 1 2 TABLET BY MOUTH ONCE DAILY 18  Yes Bonnie Ford MD   triamterene-hydrochlorothiazide (MAXZIDE-25) 37.5-25 MG per tablet TAKE ONE  1  TABLET BY MOUTH ONCE DAILY 18  Yes Bonnie Ford MD   DULoxetine (CYMBALTA) 60 MG extended release capsule Take 1 capsule by mouth daily 19  Yes Sharif Cheung DO   omeprazole (PRILOSEC) 40 MG delayed release capsule Take 1 capsule by

## 2024-08-21 ENCOUNTER — HOSPITAL ENCOUNTER (OUTPATIENT)
Age: 69
Setting detail: OUTPATIENT SURGERY
Discharge: HOME | End: 2024-08-21
Attending: PLASTIC SURGERY | Admitting: PLASTIC SURGERY
Payer: MEDICARE

## 2024-08-21 ENCOUNTER — ANESTHESIA (OUTPATIENT)
Dept: OPERATING ROOM | Age: 69
End: 2024-08-21
Payer: MEDICARE

## 2024-08-21 VITALS
BODY MASS INDEX: 39.7 KG/M2 | DIASTOLIC BLOOD PRESSURE: 63 MMHG | HEIGHT: 66 IN | RESPIRATION RATE: 16 BRPM | TEMPERATURE: 97.1 F | SYSTOLIC BLOOD PRESSURE: 137 MMHG | WEIGHT: 247 LBS | OXYGEN SATURATION: 95 % | HEART RATE: 76 BPM

## 2024-08-21 LAB
1OH-MIDAZOLAM UR CFM-MCNC: <25 NG/ML
6MAM UR CFM-MCNC: <25 NG/ML
7AMINOCLONAZEPAM UR CFM-MCNC: <25 NG/ML
A-OH ALPRAZ UR CFM-MCNC: <25 NG/ML
ALPRAZ UR CFM-MCNC: <25 NG/ML
CHLORDIAZEP UR CFM-MCNC: <25 NG/ML
CLONAZEPAM UR CFM-MCNC: <25 NG/ML
CODEINE UR CFM-MCNC: <50 NG/ML
DIAZEPAM UR CFM-MCNC: <25 NG/ML
EDDP UR CFM-MCNC: <25 NG/ML
FENTANYL UR CFM-MCNC: <2.5 NG/ML
GLUCOSE BLD-MCNC: 111 MG/DL (ref 70–99)
HYDROCODONE CTO UR CFM-MCNC: <25 NG/ML
HYDROMORPHONE UR CFM-MCNC: <25 NG/ML
LORAZEPAM UR CFM-MCNC: <25 NG/ML
METHADONE UR CFM-MCNC: <25 NG/ML
MIDAZOLAM UR CFM-MCNC: <25 NG/ML
MORPHINE UR CFM-MCNC: <50 NG/ML
NORDIAZEPAM UR CFM-MCNC: <25 NG/ML
NORFENTANYL UR CFM-MCNC: <2.5 NG/ML
NORHYDROCODONE UR CFM-MCNC: <25 NG/ML
NOROXYCODONE UR CFM-MCNC: <25 NG/ML
NORTRAMADOL UR-MCNC: >1000 NG/ML
OXAZEPAM UR CFM-MCNC: <25 NG/ML
OXYCODONE UR CFM-MCNC: <25 NG/ML
OXYMORPHONE UR CFM-MCNC: <25 NG/ML
PERFORMED ON: ABNORMAL
TEMAZEPAM UR CFM-MCNC: <25 NG/ML
TRAMADOL UR CFM-MCNC: >1000 NG/ML
ZOLPIDEM UR CFM-MCNC: <25 NG/ML
ZOLPIDEM UR-MCNC: <25 NG/ML

## 2024-08-21 PROCEDURE — 2580000003 HC RX 258: Performed by: STUDENT IN AN ORGANIZED HEALTH CARE EDUCATION/TRAINING PROGRAM

## 2024-08-21 PROCEDURE — 7100000001 HC PACU RECOVERY - ADDTL 15 MIN: Performed by: PLASTIC SURGERY

## 2024-08-21 PROCEDURE — 2500000003 HC RX 250 WO HCPCS: Performed by: NURSE ANESTHETIST, CERTIFIED REGISTERED

## 2024-08-21 PROCEDURE — RXMED WILLOW AMBULATORY MEDICATION CHARGE

## 2024-08-21 PROCEDURE — 3600000004 HC SURGERY LEVEL 4 BASE: Performed by: PLASTIC SURGERY

## 2024-08-21 PROCEDURE — C9363 INTEGRA MESHED BIL WOUND MAT: HCPCS | Performed by: PLASTIC SURGERY

## 2024-08-21 PROCEDURE — 3700000001 HC ADD 15 MINUTES (ANESTHESIA): Performed by: PLASTIC SURGERY

## 2024-08-21 PROCEDURE — 7100000000 HC PACU RECOVERY - FIRST 15 MIN: Performed by: PLASTIC SURGERY

## 2024-08-21 PROCEDURE — 6360000002 HC RX W HCPCS: Performed by: STUDENT IN AN ORGANIZED HEALTH CARE EDUCATION/TRAINING PROGRAM

## 2024-08-21 PROCEDURE — 6370000000 HC RX 637 (ALT 250 FOR IP): Performed by: PLASTIC SURGERY

## 2024-08-21 PROCEDURE — 3700000000 HC ANESTHESIA ATTENDED CARE: Performed by: PLASTIC SURGERY

## 2024-08-21 PROCEDURE — 2500000003 HC RX 250 WO HCPCS: Performed by: PLASTIC SURGERY

## 2024-08-21 PROCEDURE — 2580000003 HC RX 258: Performed by: PLASTIC SURGERY

## 2024-08-21 PROCEDURE — 2580000003 HC RX 258: Performed by: FAMILY MEDICINE

## 2024-08-21 PROCEDURE — 6370000000 HC RX 637 (ALT 250 FOR IP): Performed by: STUDENT IN AN ORGANIZED HEALTH CARE EDUCATION/TRAINING PROGRAM

## 2024-08-21 PROCEDURE — 7100000011 HC PHASE II RECOVERY - ADDTL 15 MIN: Performed by: PLASTIC SURGERY

## 2024-08-21 PROCEDURE — A4217 STERILE WATER/SALINE, 500 ML: HCPCS | Performed by: PLASTIC SURGERY

## 2024-08-21 PROCEDURE — 6360000002 HC RX W HCPCS: Performed by: FAMILY MEDICINE

## 2024-08-21 PROCEDURE — 6360000002 HC RX W HCPCS: Performed by: PLASTIC SURGERY

## 2024-08-21 PROCEDURE — 2709999900 HC NON-CHARGEABLE SUPPLY: Performed by: PLASTIC SURGERY

## 2024-08-21 PROCEDURE — 6360000002 HC RX W HCPCS: Performed by: NURSE ANESTHETIST, CERTIFIED REGISTERED

## 2024-08-21 PROCEDURE — 2580000003 HC RX 258: Performed by: NURSE ANESTHETIST, CERTIFIED REGISTERED

## 2024-08-21 PROCEDURE — 3600000014 HC SURGERY LEVEL 4 ADDTL 15MIN: Performed by: PLASTIC SURGERY

## 2024-08-21 PROCEDURE — 7100000010 HC PHASE II RECOVERY - FIRST 15 MIN: Performed by: PLASTIC SURGERY

## 2024-08-21 DEVICE — INTEGRA® MESHED BILAYER WOUND MATRIX 2 IN*2 IN (5 CM*5 CM)
Type: IMPLANTABLE DEVICE | Site: FOREHEAD | Status: FUNCTIONAL
Brand: INTEGRA®

## 2024-08-21 RX ORDER — ONDANSETRON 2 MG/ML
INJECTION INTRAMUSCULAR; INTRAVENOUS PRN
Status: DISCONTINUED | OUTPATIENT
Start: 2024-08-21 | End: 2024-08-21 | Stop reason: SDUPTHER

## 2024-08-21 RX ORDER — SODIUM CHLORIDE 0.9 % (FLUSH) 0.9 %
5-40 SYRINGE (ML) INJECTION PRN
Status: DISCONTINUED | OUTPATIENT
Start: 2024-08-21 | End: 2024-08-21 | Stop reason: HOSPADM

## 2024-08-21 RX ORDER — NALOXONE HYDROCHLORIDE 0.4 MG/ML
INJECTION, SOLUTION INTRAMUSCULAR; INTRAVENOUS; SUBCUTANEOUS PRN
Status: DISCONTINUED | OUTPATIENT
Start: 2024-08-21 | End: 2024-08-21 | Stop reason: HOSPADM

## 2024-08-21 RX ORDER — ROCURONIUM BROMIDE 10 MG/ML
INJECTION, SOLUTION INTRAVENOUS PRN
Status: DISCONTINUED | OUTPATIENT
Start: 2024-08-21 | End: 2024-08-21 | Stop reason: SDUPTHER

## 2024-08-21 RX ORDER — MINERAL OIL, PETROLATUM 425; 573 MG/G; MG/G
OINTMENT OPHTHALMIC PRN
Status: DISCONTINUED | OUTPATIENT
Start: 2024-08-21 | End: 2024-08-21 | Stop reason: HOSPADM

## 2024-08-21 RX ORDER — OXYCODONE HYDROCHLORIDE 5 MG/1
5 TABLET ORAL
Status: COMPLETED | OUTPATIENT
Start: 2024-08-21 | End: 2024-08-21

## 2024-08-21 RX ORDER — ACETAMINOPHEN 325 MG/1
650 TABLET ORAL
Status: COMPLETED | OUTPATIENT
Start: 2024-08-21 | End: 2024-08-21

## 2024-08-21 RX ORDER — MINERAL OIL
OIL (ML) MISCELLANEOUS PRN
Status: DISCONTINUED | OUTPATIENT
Start: 2024-08-21 | End: 2024-08-21 | Stop reason: HOSPADM

## 2024-08-21 RX ORDER — DEXAMETHASONE SODIUM PHOSPHATE 10 MG/ML
INJECTION INTRAMUSCULAR; INTRAVENOUS PRN
Status: DISCONTINUED | OUTPATIENT
Start: 2024-08-21 | End: 2024-08-21 | Stop reason: SDUPTHER

## 2024-08-21 RX ORDER — SODIUM CHLORIDE 0.9 % (FLUSH) 0.9 %
5-40 SYRINGE (ML) INJECTION EVERY 12 HOURS SCHEDULED
Status: DISCONTINUED | OUTPATIENT
Start: 2024-08-21 | End: 2024-08-21 | Stop reason: HOSPADM

## 2024-08-21 RX ORDER — PROPOFOL 10 MG/ML
INJECTION, EMULSION INTRAVENOUS PRN
Status: DISCONTINUED | OUTPATIENT
Start: 2024-08-21 | End: 2024-08-21 | Stop reason: SDUPTHER

## 2024-08-21 RX ORDER — EPHEDRINE SULFATE 50 MG/ML
INJECTION, SOLUTION INTRAVENOUS PRN
Status: DISCONTINUED | OUTPATIENT
Start: 2024-08-21 | End: 2024-08-21 | Stop reason: SDUPTHER

## 2024-08-21 RX ORDER — ONDANSETRON 2 MG/ML
4 INJECTION INTRAMUSCULAR; INTRAVENOUS
Status: DISCONTINUED | OUTPATIENT
Start: 2024-08-21 | End: 2024-08-21 | Stop reason: HOSPADM

## 2024-08-21 RX ORDER — TETRACAINE HYDROCHLORIDE 5 MG/ML
SOLUTION OPHTHALMIC PRN
Status: DISCONTINUED | OUTPATIENT
Start: 2024-08-21 | End: 2024-08-21 | Stop reason: HOSPADM

## 2024-08-21 RX ORDER — SODIUM CHLORIDE, SODIUM LACTATE, POTASSIUM CHLORIDE, CALCIUM CHLORIDE 600; 310; 30; 20 MG/100ML; MG/100ML; MG/100ML; MG/100ML
INJECTION, SOLUTION INTRAVENOUS CONTINUOUS
Status: DISCONTINUED | OUTPATIENT
Start: 2024-08-21 | End: 2024-08-21 | Stop reason: HOSPADM

## 2024-08-21 RX ORDER — GINSENG 100 MG
CAPSULE ORAL PRN
Status: DISCONTINUED | OUTPATIENT
Start: 2024-08-21 | End: 2024-08-21 | Stop reason: HOSPADM

## 2024-08-21 RX ORDER — LIDOCAINE HYDROCHLORIDE AND EPINEPHRINE 10; 10 MG/ML; UG/ML
INJECTION, SOLUTION INFILTRATION; PERINEURAL PRN
Status: DISCONTINUED | OUTPATIENT
Start: 2024-08-21 | End: 2024-08-21 | Stop reason: HOSPADM

## 2024-08-21 RX ORDER — MAGNESIUM HYDROXIDE 1200 MG/15ML
LIQUID ORAL CONTINUOUS PRN
Status: DISCONTINUED | OUTPATIENT
Start: 2024-08-21 | End: 2024-08-21 | Stop reason: HOSPADM

## 2024-08-21 RX ORDER — FENTANYL CITRATE 0.05 MG/ML
25 INJECTION, SOLUTION INTRAMUSCULAR; INTRAVENOUS EVERY 5 MIN PRN
Status: COMPLETED | OUTPATIENT
Start: 2024-08-21 | End: 2024-08-21

## 2024-08-21 RX ORDER — SODIUM CHLORIDE 9 MG/ML
INJECTION, SOLUTION INTRAVENOUS PRN
Status: DISCONTINUED | OUTPATIENT
Start: 2024-08-21 | End: 2024-08-21 | Stop reason: HOSPADM

## 2024-08-21 RX ORDER — LIDOCAINE HYDROCHLORIDE 10 MG/ML
1 INJECTION, SOLUTION EPIDURAL; INFILTRATION; INTRACAUDAL; PERINEURAL
Status: DISCONTINUED | OUTPATIENT
Start: 2024-08-21 | End: 2024-08-21 | Stop reason: HOSPADM

## 2024-08-21 RX ORDER — FENTANYL CITRATE 50 UG/ML
INJECTION, SOLUTION INTRAMUSCULAR; INTRAVENOUS PRN
Status: DISCONTINUED | OUTPATIENT
Start: 2024-08-21 | End: 2024-08-21 | Stop reason: SDUPTHER

## 2024-08-21 RX ORDER — SODIUM CHLORIDE, SODIUM LACTATE, POTASSIUM CHLORIDE, CALCIUM CHLORIDE 600; 310; 30; 20 MG/100ML; MG/100ML; MG/100ML; MG/100ML
INJECTION, SOLUTION INTRAVENOUS CONTINUOUS PRN
Status: DISCONTINUED | OUTPATIENT
Start: 2024-08-21 | End: 2024-08-21 | Stop reason: SDUPTHER

## 2024-08-21 RX ORDER — SUCCINYLCHOLINE/SOD CL,ISO/PF 100 MG/5ML
SYRINGE (ML) INTRAVENOUS PRN
Status: DISCONTINUED | OUTPATIENT
Start: 2024-08-21 | End: 2024-08-21 | Stop reason: SDUPTHER

## 2024-08-21 RX ADMIN — EPHEDRINE SULFATE 5 MG: 50 INJECTION, SOLUTION INTRAVENOUS at 11:14

## 2024-08-21 RX ADMIN — FENTANYL CITRATE 25 MCG: 0.05 INJECTION, SOLUTION INTRAMUSCULAR; INTRAVENOUS at 13:19

## 2024-08-21 RX ADMIN — EPHEDRINE SULFATE 10 MG: 50 INJECTION, SOLUTION INTRAVENOUS at 10:53

## 2024-08-21 RX ADMIN — SODIUM CHLORIDE, POTASSIUM CHLORIDE, SODIUM LACTATE AND CALCIUM CHLORIDE: 600; 310; 30; 20 INJECTION, SOLUTION INTRAVENOUS at 09:51

## 2024-08-21 RX ADMIN — FENTANYL CITRATE 25 MCG: 0.05 INJECTION, SOLUTION INTRAMUSCULAR; INTRAVENOUS at 13:07

## 2024-08-21 RX ADMIN — SODIUM CHLORIDE, POTASSIUM CHLORIDE, SODIUM LACTATE AND CALCIUM CHLORIDE: 600; 310; 30; 20 INJECTION, SOLUTION INTRAVENOUS at 10:17

## 2024-08-21 RX ADMIN — ROCURONIUM BROMIDE 50 MG: 10 INJECTION, SOLUTION INTRAVENOUS at 10:25

## 2024-08-21 RX ADMIN — FENTANYL CITRATE 100 MCG: 50 INJECTION INTRAMUSCULAR; INTRAVENOUS at 10:18

## 2024-08-21 RX ADMIN — ACETAMINOPHEN 650 MG: 325 TABLET ORAL at 13:00

## 2024-08-21 RX ADMIN — HYDROMORPHONE HYDROCHLORIDE 0.5 MG: 1 INJECTION, SOLUTION INTRAMUSCULAR; INTRAVENOUS; SUBCUTANEOUS at 12:39

## 2024-08-21 RX ADMIN — DEXAMETHASONE SODIUM PHOSPHATE 10 MG: 10 INJECTION INTRAMUSCULAR; INTRAVENOUS at 10:27

## 2024-08-21 RX ADMIN — PROPOFOL 100 MG: 10 INJECTION, EMULSION INTRAVENOUS at 10:23

## 2024-08-21 RX ADMIN — SUGAMMADEX 200 MG: 100 INJECTION, SOLUTION INTRAVENOUS at 12:00

## 2024-08-21 RX ADMIN — Medication 100 MG: at 10:23

## 2024-08-21 RX ADMIN — OXYCODONE HYDROCHLORIDE 5 MG: 5 TABLET ORAL at 13:42

## 2024-08-21 RX ADMIN — ONDANSETRON 4 MG: 2 INJECTION INTRAMUSCULAR; INTRAVENOUS at 10:27

## 2024-08-21 RX ADMIN — EPHEDRINE SULFATE 10 MG: 50 INJECTION, SOLUTION INTRAVENOUS at 11:09

## 2024-08-21 RX ADMIN — CEFAZOLIN 2000 MG: 2 INJECTION, POWDER, FOR SOLUTION INTRAMUSCULAR; INTRAVENOUS at 10:32

## 2024-08-21 ASSESSMENT — PAIN SCALES - GENERAL
PAINLEVEL_OUTOF10: 8
PAINLEVEL_OUTOF10: 10
PAINLEVEL_OUTOF10: 8
PAINLEVEL_OUTOF10: 8
PAINLEVEL_OUTOF10: 7
PAINLEVEL_OUTOF10: 7
PAINLEVEL_OUTOF10: 6
PAINLEVEL_OUTOF10: 8
PAINLEVEL_OUTOF10: 8

## 2024-08-21 ASSESSMENT — PAIN DESCRIPTION - DESCRIPTORS
DESCRIPTORS: ACHING

## 2024-08-21 ASSESSMENT — PAIN DESCRIPTION - LOCATION
LOCATION: LEG
LOCATION: NOSE

## 2024-08-21 ASSESSMENT — PAIN DESCRIPTION - ORIENTATION: ORIENTATION: POSTERIOR

## 2024-08-21 ASSESSMENT — PAIN - FUNCTIONAL ASSESSMENT: PAIN_FUNCTIONAL_ASSESSMENT: PREVENTS OR INTERFERES SOME ACTIVE ACTIVITIES AND ADLS

## 2024-08-21 ASSESSMENT — PAIN DESCRIPTION - FREQUENCY: FREQUENCY: CONTINUOUS

## 2024-08-21 ASSESSMENT — PAIN DESCRIPTION - ONSET: ONSET: ON-GOING

## 2024-08-21 ASSESSMENT — PAIN DESCRIPTION - PAIN TYPE: TYPE: SURGICAL PAIN

## 2024-08-21 NOTE — ANESTHESIA POSTPROCEDURE EVALUATION
Department of Anesthesiology  Postprocedure Note    Patient: Martha Ellis  MRN: [id number]  YOB: 1955  Date of evaluation: 8/21/2024    Procedure Summary       Date: 08/21/24 Room / Location: [address]    Anesthesia Start: 1017 Anesthesia Stop: 1214    Procedure: GRAFT SITE PREPARATION NOSE, TRANSPOSITI ON OF FOREHEAD FLAP TO NOSE,SPLIT-THICKNESS SKIN GRAFT TO NASAL FLAP, APPLICATION OF INTEGRA TO FOREHEAD Diagnosis:       Open wound of nasal septum, initial encounter      (Open wound of nasal septum, initial encounter [S01.20XA])    Surgeons: She Garibay MD Responsible Provider: Ligia Kim MD    Anesthesia Type: general ASA Status: 3            Anesthesia Type: No value filed.    Wade Phase I: Wade Score: 10    Wade Phase II:      Anesthesia Post Evaluation    Patient location during evaluation: bedside  Patient participation: complete - patient participated  Level of consciousness: awake and awake and alert  Airway patency: patent  Nausea & Vomiting: no nausea and no vomiting  Cardiovascular status: blood pressure returned to baseline and hemodynamically stable  Respiratory status: acceptable  Hydration status: euvolemic  Pain management: adequate        No notable events documented.

## 2024-08-21 NOTE — DISCHARGE INSTRUCTIONS
[facility]  Outpatient Discharge Instructions    To continue your care at home, please follow the instructions below and any additional discharge instructions given to you by your physician.    GENERAL ANESTHESIA:  Do not drive or operate machinery for 24hrs after discharge,  Do not drink alcohol, take tranquilizers, sleeping medication, or any other medication not directly instructed by your physician,   Do not make any important decisions or sign any legal documents for 24hrs after surgery,  Have someone with you for 24hrs after surgery to assist you as needed.    ACTIVITY:  Light activity for 24hrs,  No heavy lifting or exercise until instructed by your physician,  You may resume normal activities once instructed by your physician,  Special Instruction: ___________________________________________________________________    FLUIDS AND DIET:  An upset stomach or feeling sick (nausea) can commonly occur after surgery and/or pain medication use. To help minimize nausea:  Do not eat a heavy meal soon after your surgery,    Start with water or other clear liquids,  Advance to mild or bland items like Jell-O, dry toast, crackers, etc.,  Avoid caffeine,  Do not drink alcohol for at least 24 hours after surgery,  Your physician may prescribe anti-nausea medication if your nausea continues,  If you are free from nausea for 24hrs, you can advance to your normal diet as tolerated.    OPERATIVE SITE:  A small amount of bleeding or drainage after surgery is normal. Your physician will provide you with specific instructions on how to care for your surgical site and/or dressing.   Try not to touch your surgical site unless necessary,   Always wash your hands BEFORE and AFTER changing your dressing if instructed by your physician,   Proper handwashing includes wetting your hands with clean water, applying soap, lathering your hands by rubbing them together with soap for 20 seconds, rinsing them with clean water, and

## 2024-08-21 NOTE — BRIEF OP NOTE
Brief Postoperative Note      Patient: Martha Ellis  YOB: 1955  MRN: [id number]    Date of Procedure: 8/21/2024    Pre-Op Diagnosis Codes:      * Open wound of nasal septum, initial encounter [S01.20XA]    Post-Op Diagnosis: Same       Procedure(s):  GRAFT SITE PREPARATION NOSE, TRANSPOSITI ON OF FOREHEAD FLAP TO NOSE,SPLIT-THICKNESS SKIN GRAFT TO NASAL FLAP, APPLICATION OF INTEGRA TO FOREHEAD    Surgeon(s):  She Garibay MD    Assistant:  First Assistant: Cher Cespedes    Anesthesia: General    Estimated Blood Loss (mL): Minimal    Complications: None    Specimens:   * No specimens in log *    Implants:  Implant Name Type Inv. Item Serial No.  Lot No. LRB No. Used Action   DRESSING BIO [id number] BOV TEND CLLGN MESHED GLYCOSAMINOGLYCAN - [id number]  DRESSING BIO [id number] BOV TEND CLLGN MESHED GLYCOSAMINOGLYCAN  [employer]-  N/A 1 Implanted         Drains: * No LDAs found *    Findings:  Infection Present At Time Of Surgery (PATOS) (choose all levels that have infection present):  No infection present  Other Findings: None    Electronically signed by She Garibay MD on 8/21/2024 at 11:53 AM

## 2024-08-22 ENCOUNTER — TELEPHONE (OUTPATIENT)
Dept: PAIN MANAGEMENT | Age: 69
End: 2024-08-22

## 2024-08-22 DIAGNOSIS — K21.9 GASTROESOPHAGEAL REFLUX DISEASE, UNSPECIFIED WHETHER ESOPHAGITIS PRESENT: ICD-10-CM

## 2024-08-22 PROCEDURE — RXMED WILLOW AMBULATORY MEDICATION CHARGE

## 2024-08-22 RX ORDER — OMEPRAZOLE 40 MG/1
40 CAPSULE, DELAYED RELEASE ORAL
Qty: 90 CAPSULE | Refills: 1 | Status: SHIPPED | OUTPATIENT
Start: 2024-08-22

## 2024-08-22 NOTE — TELEPHONE ENCOUNTER
MEDICATION PENDED  Patient is calling to request a 90 day supply to go to her local pharmacy     Rx Refill Request Telephone Encounter    Name:  Marianna Buck  : 1955     Medication Name:  omeprazole  Dose (Optional):    40 mg  Quantity (Optional):    #90  Directions (Optional):   Take 1 capsule (40 mg) by mouth once daily in the morning. Take before meals.     ALLERGIES:   see list     Specific Pharmacy location:  [facility]     Date of last appointment:  24  Date of next appointment:  none     Best number to reach patient:  [phone]

## 2024-08-22 NOTE — TELEPHONE ENCOUNTER
LMOM for patient to call to schedule injection with Dr Hayden.   REJI SI JOINT INJ      AUTH FROM 8/20/24-11/17/24

## 2024-08-22 NOTE — TELEPHONE ENCOUNTER
REFERRAL # [id number]    BILAT SI JOINT INJ     AUTH FROM 8/20/24-11/17/24    OK to schedule procedure approved as above.   Please note sides/levels approved and date range.   (If applicable, sides/levels approved may differ from those ordered)    TO BE SCHEDULED WITH DR UNGER

## 2024-08-24 ENCOUNTER — PHARMACY VISIT (OUTPATIENT)
Dept: PHARMACY | Facility: CLINIC | Age: 69
End: 2024-08-24
Payer: MEDICARE

## 2024-08-29 PROCEDURE — RXMED WILLOW AMBULATORY MEDICATION CHARGE

## 2024-08-30 ENCOUNTER — APPOINTMENT (OUTPATIENT)
Dept: PHARMACY | Facility: HOSPITAL | Age: 69
End: 2024-08-30
Payer: MEDICARE

## 2024-09-04 ENCOUNTER — APPOINTMENT (OUTPATIENT)
Dept: PHARMACY | Facility: HOSPITAL | Age: 69
End: 2024-09-04
Payer: MEDICARE

## 2024-09-04 DIAGNOSIS — E11.9 TYPE 2 DIABETES MELLITUS WITHOUT COMPLICATION, WITHOUT LONG-TERM CURRENT USE OF INSULIN (MULTI): ICD-10-CM

## 2024-09-04 DIAGNOSIS — E11.65 TYPE 2 DIABETES MELLITUS WITH HYPERGLYCEMIA, WITHOUT LONG-TERM CURRENT USE OF INSULIN (MULTI): ICD-10-CM

## 2024-09-04 PROCEDURE — RXMED WILLOW AMBULATORY MEDICATION CHARGE

## 2024-09-04 NOTE — PROGRESS NOTES
Patient ID: Marianna Buck is a 68 y.o. female who presents for Diabetes Pt is here for follow-up appointment.    Referring Provider: Tom Bingham DO   Last visit with PCP: 8.16.24  Next appt: Not scheduled, encouraged    Verbal consent to manage patient's drug therapy was obtained from patient. They were informed they may decline to participate or withdraw from participation in pharmacy services at any time. Patient is agreeable to detailed voice messages if unable to be reached.       Subjective   Treatment Adherence:   Patient did take medications today.   Number of missed doses in last 7 days: 0.  Why? 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 Known diabetic complications: none. Optometry exam current (within 1year):  No, not found in chart review  Most recent visit in Podiatry was on - 6.24.24     Current diabetic medications include:  Ozempic 2mg weekly    Patient confirms above regimen.     Home blood glucose records (mg/dL) - report uploaded to chart  DEXCOM G7 -  Sensor Capture   14 day capture rate = 86%   Average Blood Sugars  14 day average = 151   Time in target (14 days)  Very High >250 = 0%  High         181-250 = 13%  Target       = 87%  Low          54-69 = 0%  Very Low  <54 = 0%   Number of Low Events (14 days)  #  of times BG < 70mg/dL = 0         Any episodes of hypoglycemia? No; Did patient treat episode of hypoglycemia appropriately? N/A  Does pt have proteinuria? No, NONE FOUND  If appropriate, is patient on ACEi/ARB? N/A    Secondary Prevention  Statin? No, Patient  refuses statin .  ACE-I/ARB? N/A.  Aspirin? Yes, 81mg daily .    Pertinent PMH Review:  PMH of Pancreatitis: No  PMH of Retinopathy: No  PMH of Urinary Tract Infections: Yes  PMH of MTC: No      Review of Systems    Objective     There were no vitals taken for this visit.     Labs  Lab Results   Component Value Date    BILITOT 0.5 01/31/2024    CALCIUM 9.3 01/31/2024    CO2 29 01/31/2024    CL 98 01/31/2024    CREATININE 0.98 01/31/2024    GLUCOSE 110 (H) 01/31/2024    ALKPHOS 68 01/31/2024    K 4.6 01/31/2024    PROT 7.4 01/31/2024     01/31/2024    AST 33 01/31/2024    ALT 32 01/31/2024    BUN 17 01/31/2024    ANIONGAP 17 01/31/2024    MG 2.00 06/04/2020    ALBUMIN 3.9 01/31/2024    LIPASE 55 09/29/2018    GFRF 88 03/31/2023     Lab Results   Component Value Date    TRIG 264 (H) 01/31/2024    CHOL 157 01/31/2024    LDLCALC 64 01/31/2024    HDL 40.6 01/31/2024     Lab Results   Component Value Date    HGBA1C 6.0 08/15/2024       Current Outpatient Medications   Medication Instructions    aspirin 81 mg, oral    blood-glucose sensor (Dexcom G7 Sensor) device Place one sensor every 10 days on back of arm to check sugars.    celecoxib (CELEBREX) 200 mg, oral, 2 times daily Nitrate    cephalexin (KEFLEX) 500 mg, oral, Every 6 hours, Antibiotic. Take as directed after surgery until the medication is finished.    docusate sodium (COLACE) 100 mg, oral, 2 times daily    DULoxetine (CYMBALTA) 60 mg, oral, Daily    estradiol (ESTRACE) 0.5 mg, oral, Daily    HYDROcodone-acetaminophen (Norco) 5-325 mg tablet 1-2 tablets, oral, Every 6 hours PRN, Take as directed for post operative pain.    hydroxychloroquine (Plaquenil) 200 mg tablet Take 1 tablet (200 mg) by mouth in the morning and in the evening.    levothyroxine (SYNTHROID, LEVOXYL) 50 mcg, oral, Daily    magnesium oxide (MAG-OX) 400 mg, oral, Daily    metoprolol succinate XL (TOPROL-XL) 25 mg, oral, Daily    metroNIDAZOLE (Metrogel) 1 % gel 1 Application, Topical,  Daily, APPLY SPARINGLY TO AFFECTED AREA(S) ONCE DAILY    neomycin-bacitracin-polymyxin (Polysporin) ophthalmic ointment Apply thin ribbon (1 inch) to sutures once daily.    nitroglycerin (Nitro-Bid) 2 % ointment Apply thin layer to the skin twice a day as directed.    omeprazole (PRILOSEC) 40 mg, oral, Daily before breakfast    pregabalin (Lyrica) 150 mg capsule Take 1 capsule (150 mg) by mouth 3 times a day (in the morning, evening, and before bed).    [START ON 9/8/2024] semaglutide 2 mg, subcutaneous, Once Weekly    traMADol (ULTRAM) 50 mg, oral, Every 6 hours PRN    traMADol (ULTRAM) 50 mg, oral, Every 6 hours PRN    triamterene-hydrochlorothiazid (Maxzide-25) 37.5-25 mg tablet TAKE 1 TABLET BY MOUTH EVERY MORNING         Assessment/Plan   Problem List Items Addressed This Visit             ICD-10-CM    Type 2 diabetes mellitus with hyperglycemia, without long-term current use of insulin (Multi) E11.65     Patient's goal A1c is < 7%.  Is pt at goal? Yes, 6% on 8/15/24  Patient's SMBGs are at goal.  Rationale for plan: Patient A1c well below goal, tolerating Ozempic well. Has had some changes to diet and has had to hold several Ozempic doses since July d/t skin cancer on face and having procedures. No changes today.       Medication Changes:  CONTINUE  Ozempic 2mg weekly       Diabetes Education  Rule of 15: eating ~15 g of carbs when BG less than 80 (half cup juice, 3-4 glucose tabs).  Recognize symptoms of high and low blood sugar.   Eat a realistic healthy diet consisting of fruits, vegetables, fiber, protein food choices on a regular basis and be aware of portion/serving sizes. Reduce carbohydrate consumption and always consume with protein and fat. Avoid foods high in saturated/trans fat, high salt content, and sweets and beverages with added sugars.  Limit alcohol consumption; alcohol may affect your blood sugar and cause hypoglycemia.   Stay active and incorporate ~30 mins of exercise into your daily  routine to manage your weight and increase the body's acceptance of insulin.  Ozempic Education:  Counseled patient on Ozempic MOA, expectations, side effects, duration of therapy, administration, and monitoring parameters.  Counseled patient on the benefits of GLP-1ra, such as cardiovascular risk reduction, glycemic control, and weight loss potential.  Provided detailed dosing and administration counseling to ensure proper technique.   Reviewed Ozempic titration schedule, starting with 0.25 mg once weekly to 0.5 mg, 1 mg, and if tolerated 2 mg.  Reviewed storage requirements of Ozempic when not in use, and when to administer the medication if a dose is missed.  Discussed risks of GLP1ra including risk of pancreatitis, MTC and worsening of DR  Advised patient that they may experience improved satiety after meals and portion sizes of meals may be reduced as doses of Ozempic increase.         Relevant Medications    semaglutide 2 mg/dose (8 mg/3 mL) pen injector (Start on 9/8/2024)    Other Relevant Orders    Follow Up In [facility] - Pharmacy     Other Visit Diagnoses         Codes    Type 2 diabetes mellitus without complication, without long-term current use of insulin (Multi)     E11.9            Labs ordered:  none     Referrals:  none     Follow-up: 10.14.24 @ 1200  via telephone with Dorcas     Time spent with pt: Total length of time 15 (minutes) of the encounter and more than 50% was spent counseling the patient.     Dorcas Mills, PharmD, ESPINOZA  Clinical Pharmacist  [phone]  [email or web address]

## 2024-09-04 NOTE — ASSESSMENT & PLAN NOTE
Patient's goal A1c is < 7%.  Is pt at goal? Yes, 6% on 8/15/24  Patient's SMBGs are at goal.  Rationale for plan: Patient A1c well below goal, tolerating Ozempic well. Has had some changes to diet and has had to hold several Ozempic doses since July d/t skin cancer on face and having procedures. No changes today.       Medication Changes:  CONTINUE  Ozempic 2mg weekly       Diabetes Education  Rule of 15: eating ~15 g of carbs when BG less than 80 (half cup juice, 3-4 glucose tabs).  Recognize symptoms of high and low blood sugar.   Eat a realistic healthy diet consisting of fruits, vegetables, fiber, protein food choices on a regular basis and be aware of portion/serving sizes. Reduce carbohydrate consumption and always consume with protein and fat. Avoid foods high in saturated/trans fat, high salt content, and sweets and beverages with added sugars.  Limit alcohol consumption; alcohol may affect your blood sugar and cause hypoglycemia.   Stay active and incorporate ~30 mins of exercise into your daily routine to manage your weight and increase the body's acceptance of insulin.  Ozempic Education:  Counseled patient on Ozempic MOA, expectations, side effects, duration of therapy, administration, and monitoring parameters.  Counseled patient on the benefits of GLP-1ra, such as cardiovascular risk reduction, glycemic control, and weight loss potential.  Provided detailed dosing and administration counseling to ensure proper technique.   Reviewed Ozempic titration schedule, starting with 0.25 mg once weekly to 0.5 mg, 1 mg, and if tolerated 2 mg.  Reviewed storage requirements of Ozempic when not in use, and when to administer the medication if a dose is missed.  Discussed risks of GLP1ra including risk of pancreatitis, MTC and worsening of DR  Advised patient that they may experience improved satiety after meals and portion sizes of meals may be reduced as doses of Ozempic increase.

## 2024-09-05 ENCOUNTER — PHARMACY VISIT (OUTPATIENT)
Dept: PHARMACY | Facility: CLINIC | Age: 69
End: 2024-09-05
Payer: MEDICARE

## 2024-09-16 ENCOUNTER — OFFICE VISIT (OUTPATIENT)
Age: 69
End: 2024-09-16
Payer: MEDICARE

## 2024-09-16 VITALS — HEIGHT: 66 IN | BODY MASS INDEX: 37.28 KG/M2 | WEIGHT: 232 LBS

## 2024-09-16 DIAGNOSIS — M48.062 SPINAL STENOSIS OF LUMBAR REGION WITH NEUROGENIC CLAUDICATION: Primary | ICD-10-CM

## 2024-09-16 PROCEDURE — 1123F ACP DISCUSS/DSCN MKR DOCD: CPT | Performed by: NEUROLOGICAL SURGERY

## 2024-09-16 PROCEDURE — 99214 OFFICE O/P EST MOD 30 MIN: CPT | Performed by: NEUROLOGICAL SURGERY

## 2024-09-16 ASSESSMENT — ENCOUNTER SYMPTOMS
EYE PAIN: 0
ABDOMINAL DISTENTION: 0
TROUBLE SWALLOWING: 0
BACK PAIN: 1
SHORTNESS OF BREATH: 0
ABDOMINAL PAIN: 0
COUGH: 0

## 2024-09-20 ENCOUNTER — PHARMACY VISIT (OUTPATIENT)
Dept: PHARMACY | Facility: CLINIC | Age: 69
End: 2024-09-20
Payer: MEDICARE

## 2024-09-20 PROCEDURE — RXMED WILLOW AMBULATORY MEDICATION CHARGE

## 2024-09-24 DIAGNOSIS — M06.9 RHEUMATOID ARTHRITIS, INVOLVING UNSPECIFIED SITE, UNSPECIFIED WHETHER RHEUMATOID FACTOR PRESENT (MULTI): ICD-10-CM

## 2024-09-24 RX ORDER — CELECOXIB 200 MG/1
200 CAPSULE ORAL
Qty: 180 CAPSULE | Refills: 1 | Status: SHIPPED | OUTPATIENT
Start: 2024-09-24

## 2024-09-26 ENCOUNTER — HOSPITAL ENCOUNTER (OUTPATIENT)
Dept: PREADMISSION TESTING | Age: 69
Discharge: HOME | End: 2024-09-30

## 2024-09-26 VITALS
SYSTOLIC BLOOD PRESSURE: 153 MMHG | TEMPERATURE: 97.1 F | DIASTOLIC BLOOD PRESSURE: 76 MMHG | OXYGEN SATURATION: 97 % | HEART RATE: 65 BPM | HEIGHT: 65 IN | WEIGHT: 245.6 LBS | BODY MASS INDEX: 40.92 KG/M2 | RESPIRATION RATE: 16 BRPM

## 2024-09-26 RX ORDER — LIDOCAINE HYDROCHLORIDE 10 MG/ML
1 INJECTION, SOLUTION EPIDURAL; INFILTRATION; INTRACAUDAL; PERINEURAL
Status: CANCELLED | OUTPATIENT
Start: 2024-10-02 | End: 2024-10-03

## 2024-09-26 RX ORDER — SODIUM CHLORIDE 0.9 % (FLUSH) 0.9 %
5-40 SYRINGE (ML) INJECTION EVERY 12 HOURS SCHEDULED
Status: CANCELLED | OUTPATIENT
Start: 2024-10-02

## 2024-09-26 RX ORDER — SODIUM CHLORIDE 0.9 % (FLUSH) 0.9 %
5-40 SYRINGE (ML) INJECTION PRN
Status: CANCELLED | OUTPATIENT
Start: 2024-10-02

## 2024-09-26 RX ORDER — SODIUM CHLORIDE 9 MG/ML
INJECTION, SOLUTION INTRAVENOUS PRN
Status: CANCELLED | OUTPATIENT
Start: 2024-10-02

## 2024-09-26 NOTE — H&P
Preoperative Consultation      Name: Martha Ellis  YOB: 1955  Date of Service: 9/26/2024  PCP: Sharif Cheung DO    CHIEF COMPLAINT:  open wound nose    HISTORY OF PRESENT ILLNESS:      The patient is a 68 y.o. female with significant past medical history of HTN, mitral valve regurgitation, tricuspid valve regurgitation, paroxysmal SVT, DM2, GERD, hypothyroidism, rheumatoid arthritis, vit d deficiency, anxiety who presents for a preoperative consultation at the request of surgeon, Dr. Garibay, who plans on performing graft site preparation nose full thickness skin graft to forehead possible split thickness skin graft to forehead division and inset of forehead flap on 10/2/24 at [facility].     Pt reports she had a spot on her nose that she describes as a pimple for years. She reports the spot looked larger so she saw [facility] and they completed a MOHS procedure 7/8/24 and was dx with basal cell carcinoma. She underwent graft site prep of the nose and delay of forehead flap for nasal reconstruction on 7/29/24 by Dr. Garibay. Pt had graft site preparation nose transposition of forehead flap to nose split thickness skin graft to nasal flap application of integra to forehead on 8/21/24 with Dr. Garibay. Pt has site currently dressed with dry clean dressing. Pt reports she changes this once a day and uses abx and nitro ointment. Pt denies any redness, drainage, odor. Pt denies any pain today.    Pt follows with cardiologist Dr. Tej Ibrahim-last visit 6/14/24. Per Dr. Ibrahim note \"Overall she is doing well from the electrophysiology standpoint. No recurrence of atrial arrhythmias. Continue with current medical therapy that includes beta-blockers.Follow my office every year or sooner if needed.\" Pt denies any chest pain, chest tightness, palpitations, dizziness, lightheadedness, shortness of breath, edema, syncope.     Planned Anesthesia: General  Known Anesthesia Problems: Hx post op n/v w/prior general

## 2024-10-01 ENCOUNTER — PHARMACY VISIT (OUTPATIENT)
Dept: PHARMACY | Facility: CLINIC | Age: 69
End: 2024-10-01
Payer: MEDICARE

## 2024-10-01 PROCEDURE — RXMED WILLOW AMBULATORY MEDICATION CHARGE

## 2024-10-02 ENCOUNTER — ANESTHESIA EVENT (OUTPATIENT)
Dept: OPERATING ROOM | Age: 69
End: 2024-10-02
Payer: MEDICARE

## 2024-10-02 ENCOUNTER — ANESTHESIA (OUTPATIENT)
Dept: OPERATING ROOM | Age: 69
End: 2024-10-02
Payer: MEDICARE

## 2024-10-02 ENCOUNTER — HOSPITAL ENCOUNTER (OUTPATIENT)
Age: 69
Setting detail: OUTPATIENT SURGERY
Discharge: HOME | End: 2024-10-02
Attending: PLASTIC SURGERY | Admitting: PLASTIC SURGERY
Payer: MEDICARE

## 2024-10-02 VITALS
DIASTOLIC BLOOD PRESSURE: 70 MMHG | WEIGHT: 245 LBS | HEIGHT: 65 IN | SYSTOLIC BLOOD PRESSURE: 156 MMHG | HEART RATE: 74 BPM | RESPIRATION RATE: 12 BRPM | BODY MASS INDEX: 40.82 KG/M2 | TEMPERATURE: 97.2 F | OXYGEN SATURATION: 97 %

## 2024-10-02 LAB
GLUCOSE BLD-MCNC: 109 MG/DL (ref 70–99)
GLUCOSE BLD-MCNC: 110 MG/DL (ref 70–99)
PERFORMED ON: ABNORMAL
PERFORMED ON: ABNORMAL

## 2024-10-02 PROCEDURE — 7100000000 HC PACU RECOVERY - FIRST 15 MIN: Performed by: PLASTIC SURGERY

## 2024-10-02 PROCEDURE — 3600000014 HC SURGERY LEVEL 4 ADDTL 15MIN: Performed by: PLASTIC SURGERY

## 2024-10-02 PROCEDURE — 3600000004 HC SURGERY LEVEL 4 BASE: Performed by: PLASTIC SURGERY

## 2024-10-02 PROCEDURE — 2709999900 HC NON-CHARGEABLE SUPPLY: Performed by: PLASTIC SURGERY

## 2024-10-02 PROCEDURE — 2580000003 HC RX 258: Performed by: PLASTIC SURGERY

## 2024-10-02 PROCEDURE — 6360000002 HC RX W HCPCS

## 2024-10-02 PROCEDURE — 7100000011 HC PHASE II RECOVERY - ADDTL 15 MIN: Performed by: PLASTIC SURGERY

## 2024-10-02 PROCEDURE — 2580000003 HC RX 258: Performed by: ANESTHESIOLOGY

## 2024-10-02 PROCEDURE — A4217 STERILE WATER/SALINE, 500 ML: HCPCS | Performed by: PLASTIC SURGERY

## 2024-10-02 PROCEDURE — 7100000001 HC PACU RECOVERY - ADDTL 15 MIN: Performed by: PLASTIC SURGERY

## 2024-10-02 PROCEDURE — 2500000003 HC RX 250 WO HCPCS: Performed by: NURSE ANESTHETIST, CERTIFIED REGISTERED

## 2024-10-02 PROCEDURE — 3700000000 HC ANESTHESIA ATTENDED CARE: Performed by: PLASTIC SURGERY

## 2024-10-02 PROCEDURE — 6360000002 HC RX W HCPCS: Performed by: NURSE ANESTHETIST, CERTIFIED REGISTERED

## 2024-10-02 PROCEDURE — 7100000010 HC PHASE II RECOVERY - FIRST 15 MIN: Performed by: PLASTIC SURGERY

## 2024-10-02 PROCEDURE — 6370000000 HC RX 637 (ALT 250 FOR IP): Performed by: PLASTIC SURGERY

## 2024-10-02 PROCEDURE — 2580000003 HC RX 258

## 2024-10-02 PROCEDURE — 3700000001 HC ADD 15 MINUTES (ANESTHESIA): Performed by: PLASTIC SURGERY

## 2024-10-02 RX ORDER — FENTANYL CITRATE 0.05 MG/ML
50 INJECTION, SOLUTION INTRAMUSCULAR; INTRAVENOUS EVERY 10 MIN PRN
Status: DISCONTINUED | OUTPATIENT
Start: 2024-10-02 | End: 2024-10-02 | Stop reason: HOSPADM

## 2024-10-02 RX ORDER — SODIUM CHLORIDE 9 MG/ML
INJECTION, SOLUTION INTRAVENOUS CONTINUOUS
Status: DISCONTINUED | OUTPATIENT
Start: 2024-10-02 | End: 2024-10-02 | Stop reason: HOSPADM

## 2024-10-02 RX ORDER — ONDANSETRON 2 MG/ML
INJECTION INTRAMUSCULAR; INTRAVENOUS
Status: DISCONTINUED | OUTPATIENT
Start: 2024-10-02 | End: 2024-10-02 | Stop reason: SDUPTHER

## 2024-10-02 RX ORDER — CELECOXIB 200 MG/1
200 CAPSULE ORAL 2 TIMES DAILY
Status: ON HOLD | COMMUNITY
End: 2024-10-02 | Stop reason: HOSPADM

## 2024-10-02 RX ORDER — MINERAL OIL, PETROLATUM 425; 573 MG/G; MG/G
OINTMENT OPHTHALMIC PRN
Status: DISCONTINUED | OUTPATIENT
Start: 2024-10-02 | End: 2024-10-02 | Stop reason: HOSPADM

## 2024-10-02 RX ORDER — DEXAMETHASONE SODIUM PHOSPHATE 10 MG/ML
INJECTION INTRAMUSCULAR; INTRAVENOUS
Status: DISCONTINUED | OUTPATIENT
Start: 2024-10-02 | End: 2024-10-02 | Stop reason: SDUPTHER

## 2024-10-02 RX ORDER — LIDOCAINE HYDROCHLORIDE 10 MG/ML
1 INJECTION, SOLUTION EPIDURAL; INFILTRATION; INTRACAUDAL; PERINEURAL
Status: DISCONTINUED | OUTPATIENT
Start: 2024-10-02 | End: 2024-10-02 | Stop reason: HOSPADM

## 2024-10-02 RX ORDER — TETRACAINE HYDROCHLORIDE 5 MG/ML
SOLUTION OPHTHALMIC PRN
Status: DISCONTINUED | OUTPATIENT
Start: 2024-10-02 | End: 2024-10-02 | Stop reason: HOSPADM

## 2024-10-02 RX ORDER — MIDAZOLAM HYDROCHLORIDE 1 MG/ML
INJECTION INTRAMUSCULAR; INTRAVENOUS
Status: DISCONTINUED | OUTPATIENT
Start: 2024-10-02 | End: 2024-10-02 | Stop reason: SDUPTHER

## 2024-10-02 RX ORDER — DEXMEDETOMIDINE HYDROCHLORIDE 100 UG/ML
INJECTION, SOLUTION INTRAVENOUS
Status: DISCONTINUED | OUTPATIENT
Start: 2024-10-02 | End: 2024-10-02 | Stop reason: SDUPTHER

## 2024-10-02 RX ORDER — NALOXONE HYDROCHLORIDE 0.4 MG/ML
INJECTION, SOLUTION INTRAMUSCULAR; INTRAVENOUS; SUBCUTANEOUS PRN
Status: DISCONTINUED | OUTPATIENT
Start: 2024-10-02 | End: 2024-10-02 | Stop reason: HOSPADM

## 2024-10-02 RX ORDER — PROPOFOL 10 MG/ML
INJECTION, EMULSION INTRAVENOUS
Status: DISCONTINUED | OUTPATIENT
Start: 2024-10-02 | End: 2024-10-02 | Stop reason: SDUPTHER

## 2024-10-02 RX ORDER — ROCURONIUM BROMIDE 10 MG/ML
INJECTION, SOLUTION INTRAVENOUS
Status: DISCONTINUED | OUTPATIENT
Start: 2024-10-02 | End: 2024-10-02 | Stop reason: SDUPTHER

## 2024-10-02 RX ORDER — SODIUM CHLORIDE 0.9 % (FLUSH) 0.9 %
5-40 SYRINGE (ML) INJECTION PRN
Status: DISCONTINUED | OUTPATIENT
Start: 2024-10-02 | End: 2024-10-02 | Stop reason: HOSPADM

## 2024-10-02 RX ORDER — ONDANSETRON 2 MG/ML
4 INJECTION INTRAMUSCULAR; INTRAVENOUS
Status: DISCONTINUED | OUTPATIENT
Start: 2024-10-02 | End: 2024-10-02 | Stop reason: HOSPADM

## 2024-10-02 RX ORDER — MAGNESIUM HYDROXIDE 1200 MG/15ML
LIQUID ORAL CONTINUOUS PRN
Status: DISCONTINUED | OUTPATIENT
Start: 2024-10-02 | End: 2024-10-02 | Stop reason: HOSPADM

## 2024-10-02 RX ORDER — SODIUM CHLORIDE 9 MG/ML
INJECTION, SOLUTION INTRAVENOUS PRN
Status: DISCONTINUED | OUTPATIENT
Start: 2024-10-02 | End: 2024-10-02 | Stop reason: HOSPADM

## 2024-10-02 RX ORDER — DIPHENHYDRAMINE HYDROCHLORIDE 50 MG/ML
12.5 INJECTION INTRAMUSCULAR; INTRAVENOUS
Status: DISCONTINUED | OUTPATIENT
Start: 2024-10-02 | End: 2024-10-02 | Stop reason: HOSPADM

## 2024-10-02 RX ORDER — SODIUM CHLORIDE 0.9 % (FLUSH) 0.9 %
5-40 SYRINGE (ML) INJECTION EVERY 12 HOURS SCHEDULED
Status: DISCONTINUED | OUTPATIENT
Start: 2024-10-02 | End: 2024-10-02 | Stop reason: HOSPADM

## 2024-10-02 RX ORDER — LIDOCAINE HYDROCHLORIDE 10 MG/ML
INJECTION, SOLUTION EPIDURAL; INFILTRATION; INTRACAUDAL; PERINEURAL
Status: DISCONTINUED | OUTPATIENT
Start: 2024-10-02 | End: 2024-10-02 | Stop reason: SDUPTHER

## 2024-10-02 RX ORDER — POVIDONE-IODINE 5 %
SOLUTION, NON-ORAL OPHTHALMIC (EYE) PRN
Status: DISCONTINUED | OUTPATIENT
Start: 2024-10-02 | End: 2024-10-02 | Stop reason: HOSPADM

## 2024-10-02 RX ORDER — METOCLOPRAMIDE HYDROCHLORIDE 5 MG/ML
10 INJECTION INTRAMUSCULAR; INTRAVENOUS
Status: DISCONTINUED | OUTPATIENT
Start: 2024-10-02 | End: 2024-10-02 | Stop reason: HOSPADM

## 2024-10-02 RX ORDER — KETAMINE HYDROCHLORIDE 10 MG/ML
50 INJECTION, SOLUTION INTRAMUSCULAR; INTRAVENOUS ONCE
Status: DISCONTINUED | OUTPATIENT
Start: 2024-10-02 | End: 2024-10-02 | Stop reason: HOSPADM

## 2024-10-02 RX ADMIN — CEFAZOLIN 2000 MG: 2 INJECTION, POWDER, FOR SOLUTION INTRAMUSCULAR; INTRAVENOUS at 14:13

## 2024-10-02 RX ADMIN — ONDANSETRON 4 MG: 2 INJECTION, SOLUTION INTRAMUSCULAR; INTRAVENOUS at 15:19

## 2024-10-02 RX ADMIN — SODIUM CHLORIDE: 9 INJECTION, SOLUTION INTRAVENOUS at 14:55

## 2024-10-02 RX ADMIN — DEXMEDETOMIDINE 20 MCG: 100 INJECTION, SOLUTION INTRAVENOUS at 13:58

## 2024-10-02 RX ADMIN — SODIUM CHLORIDE: 9 INJECTION, SOLUTION INTRAVENOUS at 12:21

## 2024-10-02 RX ADMIN — DEXAMETHASONE SODIUM PHOSPHATE 10 MG: 10 INJECTION INTRAMUSCULAR; INTRAVENOUS at 14:37

## 2024-10-02 RX ADMIN — PROPOFOL 200 MG: 10 INJECTION, EMULSION INTRAVENOUS at 14:04

## 2024-10-02 RX ADMIN — LIDOCAINE HYDROCHLORIDE 50 MG: 10 INJECTION, SOLUTION EPIDURAL; INFILTRATION; INTRACAUDAL; PERINEURAL at 14:04

## 2024-10-02 RX ADMIN — SODIUM CHLORIDE: 9 INJECTION, SOLUTION INTRAVENOUS at 13:58

## 2024-10-02 RX ADMIN — SUGAMMADEX 200 MG: 100 INJECTION, SOLUTION INTRAVENOUS at 15:26

## 2024-10-02 RX ADMIN — MIDAZOLAM HYDROCHLORIDE 2 MG: 1 INJECTION, SOLUTION INTRAMUSCULAR; INTRAVENOUS at 14:23

## 2024-10-02 RX ADMIN — ROCURONIUM BROMIDE 50 MG: 10 INJECTION, SOLUTION INTRAVENOUS at 14:04

## 2024-10-02 ASSESSMENT — PAIN SCALES - GENERAL
PAINLEVEL_OUTOF10: 0

## 2024-10-02 NOTE — ANESTHESIA PRE PROCEDURE
9/5/18 10/2/24 Yes Sharif Cheung, DO   HYDROcodone-acetaminophen (NORCO) 5-325 MG per tablet TAKE 1 OR 2 TABLETS BY MOUTH EVERY 4 TO 6 HOURS AS NEEDED 5/10/17  Yes Bonnie Ford MD   Continuous Glucose Sensor (DEXCOM G7 SENSOR) MISC  6/24/24   Bonnie Ford MD   semaglutide, 2 MG/DOSE, (OZEMPIC) 8 MG/3ML SOPN sc injection Inject 2 mg into the skin 5/5/24   Bonnie Ford MD   cyclobenzaprine (FLEXERIL) 10 MG tablet Take 0.5-1 tablets by mouth nightly as needed  Patient not taking: Reported on 10/2/2024 4/12/23   Bonnie Ford MD   metroNIDAZOLE (METROGEL) 1 % gel Apply topically daily 12/23/15   Sharif Cheung DO       Current medications:    Current Facility-Administered Medications   Medication Dose Route Frequency Provider Last Rate Last Admin    0.9 % sodium chloride infusion   IntraVENous Continuous Judy Borrego MD        lidocaine PF 1 % injection 1 mL  1 mL IntraDERmal Once PRN Chase, Julienne, APRN - CNP        sodium chloride flush 0.9 % injection 5-40 mL  5-40 mL IntraVENous 2 times per day Chase, Julienne, APRN - CNP        sodium chloride flush 0.9 % injection 5-40 mL  5-40 mL IntraVENous PRN Chase, Julienne, APRN - CNP        0.9 % sodium chloride infusion   IntraVENous PRN Chase, Julienne, APRN - CNP        ceFAZolin (ANCEF) 2,000 mg in sodium chloride 0.9 % 100 mL IVPB (mini-bag)  2,000 mg IntraVENous Once Chase, Julienne, APRN - CNP           Allergies:    Allergies   Allergen Reactions    Erythromycin      Other reaction(s): Rash, Rash    Sulfa Antibiotics Other (See Comments)     Adverse reaction      Codeine Rash    Percocet [Oxycodone-Acetaminophen] Rash       Problem List:    Patient Active Problem List   Diagnosis Code    RA (rheumatoid arthritis) (HCC) M06.9    HTN (hypertension) I10    Panic attacks F41.0    Hemorrhoids K64.9    Rosacea L71.9    Chronic back pain M54.9, G89.29    Trochanteric bursitis of right hip M70.61    Lumbosacral spondylosis without myelopathy

## 2024-10-02 NOTE — ANESTHESIA POSTPROCEDURE EVALUATION
Department of Anesthesiology  Postprocedure Note    Patient: Martha Ellis  MRN: [id number]  YOB: 1955  Date of evaluation: 10/2/2024    Procedure Summary       Date: 10/02/24 Room / Location: [address]    Anesthesia Start: 1358 Anesthesia Stop: 1538    Procedure: GRAFT SITE PREPARATION NOSE, FULL THICKNESS SKIN GRAFT TO THE FOREHEAD WITH SPLIT THICKNESS SKIN GRAFT TO FOREHEAD, DIVISION AND INSET OF FOREHEAD FLAP (Head) Diagnosis:       Open wound of nose, unspecified open wound type, initial encounter      (Open wound of nose, unspecified open wound type, initial encounter [S01.20XA])    Surgeons: She Garibay MD Responsible Provider: Judy Borrego MD    Anesthesia Type: general ASA Status: 3            Anesthesia Type: No value filed.    Wade Phase I: Wade Score: 10    Wade Phase II:      Anesthesia Post Evaluation    Patient location during evaluation: PACU  Patient participation: complete - patient participated  Level of consciousness: awake and alert  Pain score: 0  Airway patency: patent  Nausea & Vomiting: no vomiting and no nausea  Cardiovascular status: hemodynamically stable  Respiratory status: room air  Hydration status: stable  Pain management: adequate    No notable events documented.

## 2024-10-03 NOTE — OP NOTE
[facility]                   [address] [place] [address]                            OPERATIVE REPORT      PATIENT NAME: CHUCKY ALBERTO                : 1955  MED REC NO: [id number]                        ROOM:   ACCOUNT NO: [id number]                       ADMIT DATE: 10/02/2024  PROVIDER: She Garibay MD      DATE OF PROCEDURE:  10/02/2024    SURGEON:  She Garibay MD    FIRST ASSISTANT:  Jerel Cespedes CNP.    Please note a surgical assistant was utilized for all key portions of the procedure including excision, extraction, retraction, and construction.  No surgical residents or fellows were utilized for any portions of the procedure.    PREOPERATIVE DIAGNOSIS:  Open wound, nose.    POSTOPERATIVE DIAGNOSIS:  Open wound, nose.    PROCEDURES:    1. Division and inset of forehead flap.  2. Graft site preparation, forehead.  3. Full-thickness skin graft to forehead, 2 x 3 cm which is 6 sq cm.    ANESTHESIA:  General endotracheal anesthesia.    HISTORY OF PRESENT ILLNESS:  The patient is a 68-year-old female who underwent a Mohs excision of a skin cancer on her nasal tip.  She was left with large open wound with exposed cartilage and she desired reconstruction with a staged forehead flap reconstruction.  She presents now for division and inset and skin grafting to the forehead donor site.  All the risks and benefits of the procedure were discussed with the patient and she decided to go ahead with the described procedure.    DESCRIPTION OF OPERATIVE PROCEDURE:  The patient was marked in the preop holding area and brought into the operating room and placed supine on the operating table.  After adequate IV sedation, she was intubated by Anesthesia without complications and given IV antibiotics.  Then, 1% Xylocaine with epinephrine was injected in a field block around the base of her forehead donor site where the Integra had been taken off and then, to the base of the forehead flap

## 2024-10-14 ENCOUNTER — APPOINTMENT (OUTPATIENT)
Dept: PHARMACY | Facility: HOSPITAL | Age: 69
End: 2024-10-14
Payer: MEDICARE

## 2024-10-14 DIAGNOSIS — E11.65 TYPE 2 DIABETES MELLITUS WITH HYPERGLYCEMIA, WITHOUT LONG-TERM CURRENT USE OF INSULIN: ICD-10-CM

## 2024-10-14 NOTE — PROGRESS NOTES
Patient ID: Marianna Buck is a 68 y.o. female who presents for No chief complaint on file. Pt is here for follow-up appointment.    Referring Provider: Tom Bingham DO   Last visit with PCP: 8.16.24  Next appt: Not scheduled, encouraged    Verbal consent to manage patient's drug therapy was obtained from patient. They were informed they may decline to participate or withdraw from participation in pharmacy services at any time. Patient is agreeable to detailed voice messages if unable to be reached.       Subjective   Treatment Adherence:   Patient did take medications today.   Number of missed doses in last 7 days: 0.  Why? 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 Known diabetic complications: none.  Optometry exam current (within 1year):  Yes, July 2024 per pt  Most recent visit in Podiatry was on - 6.24.24     Current diabetic medications include:  Ozempic 2mg weekly    Patient confirms above regimen.     Home blood glucose records (mg/dL) - report uploaded to chart  DEXCOM G7 -  Sensor Capture   14 day capture rate = 86%   Average Blood Sugars  14 day average = 135   Time in target (14 days)  Very High >250 = 0%  High         181-250 = 6%  Target       = 94%  Low          54-69 = 0%  Very Low  <54 = 0%   Number of Low Events (14 days)  #  of times BG < 70mg/dL = 0       Any episodes of hypoglycemia? No; Did patient treat episode of hypoglycemia appropriately? N/A  Does pt have proteinuria? No, NONE FOUND  If appropriate, is patient on ACEi/ARB? N/A    Secondary Prevention  Statin? No,  Patient refuses statin .  ACE-I/ARB? N/A.  Aspirin? Yes, 81mg daily .    Pertinent PMH Review:  PMH of Pancreatitis: No  PMH of Retinopathy: No  PMH of Urinary Tract Infections: Yes  PMH of MTC: No      Review of Systems    Objective     There were no vitals taken for this visit.     Labs  Lab Results   Component Value Date    BILITOT 0.5 01/31/2024    CALCIUM 9.3 01/31/2024    CO2 29 01/31/2024    CL 98 01/31/2024    CREATININE 0.98 01/31/2024    GLUCOSE 110 (H) 01/31/2024    ALKPHOS 68 01/31/2024    K 4.6 01/31/2024    PROT 7.4 01/31/2024     01/31/2024    AST 33 01/31/2024    ALT 32 01/31/2024    BUN 17 01/31/2024    ANIONGAP 17 01/31/2024    MG 2.00 06/04/2020    ALBUMIN 3.9 01/31/2024    LIPASE 55 09/29/2018    GFRF 88 03/31/2023     Lab Results   Component Value Date    TRIG 264 (H) 01/31/2024    CHOL 157 01/31/2024    LDLCALC 64 01/31/2024    HDL 40.6 01/31/2024     Lab Results   Component Value Date    HGBA1C 6.0 08/15/2024       Current Outpatient Medications   Medication Instructions    aspirin 81 mg, oral    blood-glucose sensor (Dexcom G7 Sensor) device Place one sensor every 10 days on back of arm to check sugars.    celecoxib (CELEBREX) 200 mg, oral, 2 times daily Nitrate    cephalexin (KEFLEX) 500 mg, oral, Every 6 hours, Antibiotic. Take as directed after surgery until the medication is finished.    docusate sodium (COLACE) 100 mg, oral, 2 times daily    DULoxetine (CYMBALTA) 60 mg, oral, Daily    estradiol (ESTRACE) 0.5 mg, oral, Daily    HYDROcodone-acetaminophen (Norco) 5-325 mg tablet 1-2 tablets, oral, Every 6 hours PRN, Take as directed for post operative pain.    hydroxychloroquine (Plaquenil) 200 mg tablet Take 1 tablet (200 mg) by mouth in the morning and in the evening.    levothyroxine (SYNTHROID, LEVOXYL) 50 mcg, oral, Daily    magnesium oxide (MAG-OX) 400 mg, oral, Daily    metoprolol succinate XL (TOPROL-XL) 25 mg, oral, Daily    metroNIDAZOLE (Metrogel) 1 % gel 1 Application,  Topical, Daily, APPLY SPARINGLY TO AFFECTED AREA(S) ONCE DAILY    neomycin-bacitracin-polymyxin (Polysporin) ophthalmic ointment Apply thin ribbon (1 inch) to sutures once daily.    nitroglycerin (Nitro-Bid) 2 % ointment Apply thin layer to the skin twice a day as directed.    omeprazole (PRILOSEC) 40 mg, oral, Daily before breakfast    pregabalin (Lyrica) 150 mg capsule Take 1 capsule (150 mg) by mouth 3 times a day (in the morning, evening, and before bed).    semaglutide 2 mg, subcutaneous, Once Weekly    traMADol (ULTRAM) 50 mg, oral, Every 6 hours PRN    traMADol (ULTRAM) 50 mg, oral, Every 6 hours PRN    triamterene-hydrochlorothiazid (Maxzide-25) 37.5-25 mg tablet TAKE 1 TABLET BY MOUTH EVERY MORNING         Assessment/Plan   Problem List Items Addressed This Visit             ICD-10-CM    Type 2 diabetes mellitus with hyperglycemia, without long-term current use of insulin E11.65     Patient's goal A1c is < 7%.  Is pt at goal? Yes, 6% on 8/15/24  Patient's SMBGs are at goal.  Rationale for plan: Patient A1c well below goal, tolerating Ozempic well.      Medication Changes:  CONTINUE  Ozempic 2mg weekly       Diabetes Education  Rule of 15: eating ~15 g of carbs when BG less than 80 (half cup juice, 3-4 glucose tabs).  Recognize symptoms of high and low blood sugar.   Eat a realistic healthy diet consisting of fruits, vegetables, fiber, protein food choices on a regular basis and be aware of portion/serving sizes. Reduce carbohydrate consumption and always consume with protein and fat. Avoid foods high in saturated/trans fat, high salt content, and sweets and beverages with added sugars.  Limit alcohol consumption; alcohol may affect your blood sugar and cause hypoglycemia.   Stay active and incorporate ~30 mins of exercise into your daily routine to manage your weight and increase the body's acceptance of insulin.  Ozempic Education:  Counseled patient on Ozempic MOA, expectations, side effects, duration of  therapy, administration, and monitoring parameters.  Counseled patient on the benefits of GLP-1ra, such as cardiovascular risk reduction, glycemic control, and weight loss potential.  Provided detailed dosing and administration counseling to ensure proper technique.   Reviewed Ozempic titration schedule, starting with 0.25 mg once weekly to 0.5 mg, 1 mg, and if tolerated 2 mg.  Reviewed storage requirements of Ozempic when not in use, and when to administer the medication if a dose is missed.  Discussed risks of GLP1ra including risk of pancreatitis, MTC and worsening of DR  Advised patient that they may experience improved satiety after meals and portion sizes of meals may be reduced as doses of Ozempic increase.         Relevant Orders    Referral to Clinical Pharmacy     Immunizations needed: DTaP, Influenza, and COVD, RSV    Labs ordered:  none     Referrals:  none     Follow-up: Dec 9 at 12 via tele with Dorcas     Patient was provided with PharmD phone number and encouraged to reach out with any questions or concerns Prior to next appointment or ask provider for another pharmacy referral.    Time spent with pt: Total length of time 15 (minutes) of the encounter and more than 50% was spent counseling the patient.    Thank you for allowing to take part in the care of this patient.    Raul GlassD, ESPINOZA  Clinical Pharmacist  [phone]  [email or web address]    Continue all meds under the continuation of care with the referring provider and clinical pharmacy team.    Verbal consent to manage patient's drug therapy was obtained from the patient. They were informed they may decline to participate or withdraw from participation in pharmacy services at any time.

## 2024-10-14 NOTE — ASSESSMENT & PLAN NOTE
Patient's goal A1c is < 7%.  Is pt at goal? Yes, 6% on 8/15/24  Patient's SMBGs are at goal.  Rationale for plan: Patient A1c well below goal, tolerating Ozempic well.      Medication Changes:  CONTINUE  Ozempic 2mg weekly       Diabetes Education  Rule of 15: eating ~15 g of carbs when BG less than 80 (half cup juice, 3-4 glucose tabs).  Recognize symptoms of high and low blood sugar.   Eat a realistic healthy diet consisting of fruits, vegetables, fiber, protein food choices on a regular basis and be aware of portion/serving sizes. Reduce carbohydrate consumption and always consume with protein and fat. Avoid foods high in saturated/trans fat, high salt content, and sweets and beverages with added sugars.  Limit alcohol consumption; alcohol may affect your blood sugar and cause hypoglycemia.   Stay active and incorporate ~30 mins of exercise into your daily routine to manage your weight and increase the body's acceptance of insulin.  Ozempic Education:  Counseled patient on Ozempic MOA, expectations, side effects, duration of therapy, administration, and monitoring parameters.  Counseled patient on the benefits of GLP-1ra, such as cardiovascular risk reduction, glycemic control, and weight loss potential.  Provided detailed dosing and administration counseling to ensure proper technique.   Reviewed Ozempic titration schedule, starting with 0.25 mg once weekly to 0.5 mg, 1 mg, and if tolerated 2 mg.  Reviewed storage requirements of Ozempic when not in use, and when to administer the medication if a dose is missed.  Discussed risks of GLP1ra including risk of pancreatitis, MTC and worsening of DR  Advised patient that they may experience improved satiety after meals and portion sizes of meals may be reduced as doses of Ozempic increase.

## 2024-10-21 PROCEDURE — RXMED WILLOW AMBULATORY MEDICATION CHARGE

## 2024-10-25 ENCOUNTER — PHARMACY VISIT (OUTPATIENT)
Dept: PHARMACY | Facility: CLINIC | Age: 69
End: 2024-10-25
Payer: MEDICARE

## 2024-10-25 PROCEDURE — RXMED WILLOW AMBULATORY MEDICATION CHARGE

## 2024-10-28 ENCOUNTER — APPOINTMENT (OUTPATIENT)
Dept: RHEUMATOLOGY | Facility: CLINIC | Age: 69
End: 2024-10-28
Payer: MEDICARE

## 2024-11-08 ENCOUNTER — TELEPHONE (OUTPATIENT)
Dept: PRIMARY CARE | Facility: CLINIC | Age: 69
End: 2024-11-08
Payer: MEDICARE

## 2024-11-08 NOTE — TELEPHONE ENCOUNTER
[employer] on behalf on [employer] is calling asking about the patient being put on statins for Type 2 diabetes, is  recommend by her healthcare plan.  [phone] ref# [id number]

## 2024-11-11 ENCOUNTER — APPOINTMENT (OUTPATIENT)
Dept: RHEUMATOLOGY | Facility: CLINIC | Age: 69
End: 2024-11-11
Payer: MEDICARE

## 2024-11-11 DIAGNOSIS — E55.9 VITAMIN D DEFICIENCY: ICD-10-CM

## 2024-11-11 DIAGNOSIS — M15.0 PRIMARY OSTEOARTHRITIS INVOLVING MULTIPLE JOINTS: Primary | ICD-10-CM

## 2024-11-11 PROBLEM — T46.6X5A STATIN MYOPATHY: Status: ACTIVE | Noted: 2024-11-11

## 2024-11-11 PROBLEM — G72.0 STATIN MYOPATHY: Status: ACTIVE | Noted: 2024-11-11

## 2024-11-11 NOTE — TELEPHONE ENCOUNTER
CARLOS Bingham, DO  You; Do [id number] [facility] Clerical; Do [id number] [facility] Clinical Support Staff43 minutes ago (10:13 AM)       Give her a call, see how she is doing, let her know her insurance company sent a letter to us and probably to her also that they recommend a low-dose statin because her triglycerides are up,, we do recommend statins for every diabetic but is ultimately up to her,, the #1 goal is to reduce risk of heart disease,, we would recommend Crestor 5 mg every day or even every other day, let us know what her responses and please document it for her insurance purposes thank you   Spoke to Darleen, She doesn't want to take any statins, doesn't want to take any more medications than what she is already on, she is aware of letter

## 2024-11-12 DIAGNOSIS — M79.7 FIBROMYALGIA: ICD-10-CM

## 2024-11-12 DIAGNOSIS — I10 HYPERTENSION, UNSPECIFIED TYPE: ICD-10-CM

## 2024-11-12 PROCEDURE — RXMED WILLOW AMBULATORY MEDICATION CHARGE

## 2024-11-12 NOTE — TELEPHONE ENCOUNTER
MEDICATION PENDED  Rx Refill Request Telephone Encounter    Name:  Marianna Buck  :  [id number]  Rx #: [id number]  metoprolol succinate XL (Toprol-XL) 25 mg 24 hr tablet [[id number]]    Order Details  Dose: 25 mg Route: oral Frequency: Daily   Dispense Quantity: 90 tablet Refills: 0 Fills remainin         Sig: Take 1 tablet (25 mg) by mouth once daily.     Rx #: [id number]  DULoxetine (Cymbalta) 60 mg DR capsule [[id number]]    Order Details  Dose: 60 mg Route: oral Frequency: Daily   Dispense Quantity: 90 capsule Refills: 1 Fills remainin         Sig: Take 1 capsule (60 mg) by mouth once daily.     Specific Pharmacy location:    [facility]  [address] [place] [address]  Phone: [phone]  Fax: [phone]  LIZBETH #: [id number]     Date of last appointment:    Date of next appointment:  24  Best number to reach patient:  [phone]

## 2024-11-13 PROCEDURE — RXMED WILLOW AMBULATORY MEDICATION CHARGE

## 2024-11-13 RX ORDER — DULOXETIN HYDROCHLORIDE 60 MG/1
60 CAPSULE, DELAYED RELEASE ORAL DAILY
Qty: 90 CAPSULE | Refills: 0 | Status: SHIPPED | OUTPATIENT
Start: 2024-11-13

## 2024-11-13 RX ORDER — METOPROLOL SUCCINATE 25 MG/1
25 TABLET, EXTENDED RELEASE ORAL DAILY
Qty: 90 TABLET | Refills: 0 | Status: SHIPPED | OUTPATIENT
Start: 2024-11-13

## 2024-11-14 ENCOUNTER — APPOINTMENT (OUTPATIENT)
Dept: RADIOLOGY | Facility: CLINIC | Age: 69
End: 2024-11-14
Payer: MEDICARE

## 2024-11-14 ENCOUNTER — PHARMACY VISIT (OUTPATIENT)
Dept: PHARMACY | Facility: CLINIC | Age: 69
End: 2024-11-14
Payer: MEDICARE

## 2024-11-15 PROCEDURE — RXMED WILLOW AMBULATORY MEDICATION CHARGE

## 2024-11-18 PROCEDURE — RXMED WILLOW AMBULATORY MEDICATION CHARGE

## 2024-11-26 ENCOUNTER — PHARMACY VISIT (OUTPATIENT)
Dept: PHARMACY | Facility: CLINIC | Age: 69
End: 2024-11-26
Payer: MEDICARE

## 2024-11-26 DIAGNOSIS — N95.1 MENOPAUSAL STATE: ICD-10-CM

## 2024-11-26 DIAGNOSIS — E03.9 HYPOTHYROIDISM, UNSPECIFIED TYPE: ICD-10-CM

## 2024-11-26 DIAGNOSIS — N30.00 ACUTE CYSTITIS WITHOUT HEMATURIA: ICD-10-CM

## 2024-11-26 PROCEDURE — RXMED WILLOW AMBULATORY MEDICATION CHARGE

## 2024-11-26 RX ORDER — LEVOTHYROXINE SODIUM 50 UG/1
50 TABLET ORAL DAILY
Qty: 90 TABLET | Refills: 0 | Status: SHIPPED | OUTPATIENT
Start: 2024-11-26

## 2024-11-26 RX ORDER — ESTRADIOL 0.5 MG/1
0.5 TABLET ORAL DAILY
Qty: 90 TABLET | Refills: 0 | Status: SHIPPED | OUTPATIENT
Start: 2024-11-26

## 2024-11-26 RX ORDER — TRIAMTERENE/HYDROCHLOROTHIAZID 37.5-25 MG
1 TABLET ORAL
Qty: 90 TABLET | Refills: 0 | Status: SHIPPED | OUTPATIENT
Start: 2024-11-26 | End: 2025-11-26

## 2024-12-09 ENCOUNTER — OFFICE VISIT (OUTPATIENT)
Dept: PRIMARY CARE | Facility: CLINIC | Age: 69
End: 2024-12-09
Payer: MEDICARE

## 2024-12-09 ENCOUNTER — APPOINTMENT (OUTPATIENT)
Dept: PHARMACY | Facility: HOSPITAL | Age: 69
End: 2024-12-09
Payer: MEDICARE

## 2024-12-09 VITALS
DIASTOLIC BLOOD PRESSURE: 76 MMHG | SYSTOLIC BLOOD PRESSURE: 124 MMHG | HEIGHT: 66 IN | WEIGHT: 243 LBS | TEMPERATURE: 97.5 F | RESPIRATION RATE: 16 BRPM | HEART RATE: 72 BPM | BODY MASS INDEX: 39.05 KG/M2 | OXYGEN SATURATION: 97 %

## 2024-12-09 DIAGNOSIS — M17.0 OSTEOARTHRITIS OF BOTH KNEES, UNSPECIFIED OSTEOARTHRITIS TYPE: ICD-10-CM

## 2024-12-09 DIAGNOSIS — M06.9 RHEUMATOID ARTHRITIS, INVOLVING UNSPECIFIED SITE, UNSPECIFIED WHETHER RHEUMATOID FACTOR PRESENT (MULTI): ICD-10-CM

## 2024-12-09 DIAGNOSIS — E11.9 TYPE 2 DIABETES MELLITUS WITHOUT COMPLICATION, WITHOUT LONG-TERM CURRENT USE OF INSULIN (MULTI): ICD-10-CM

## 2024-12-09 DIAGNOSIS — I10 PRIMARY HYPERTENSION: ICD-10-CM

## 2024-12-09 DIAGNOSIS — E03.9 HYPOTHYROIDISM, UNSPECIFIED TYPE: ICD-10-CM

## 2024-12-09 DIAGNOSIS — I10 HYPERTENSION, UNSPECIFIED TYPE: ICD-10-CM

## 2024-12-09 DIAGNOSIS — N30.00 ACUTE CYSTITIS WITHOUT HEMATURIA: ICD-10-CM

## 2024-12-09 DIAGNOSIS — K21.9 GASTROESOPHAGEAL REFLUX DISEASE, UNSPECIFIED WHETHER ESOPHAGITIS PRESENT: ICD-10-CM

## 2024-12-09 DIAGNOSIS — M25.562 LEFT KNEE PAIN, UNSPECIFIED CHRONICITY: ICD-10-CM

## 2024-12-09 DIAGNOSIS — M54.50 CHRONIC LOW BACK PAIN, UNSPECIFIED BACK PAIN LATERALITY, UNSPECIFIED WHETHER SCIATICA PRESENT: ICD-10-CM

## 2024-12-09 DIAGNOSIS — M79.7 FIBROMYALGIA: ICD-10-CM

## 2024-12-09 DIAGNOSIS — E66.812 CLASS 2 OBESITY DUE TO EXCESS CALORIES WITHOUT SERIOUS COMORBIDITY WITH BODY MASS INDEX (BMI) OF 39.0 TO 39.9 IN ADULT: ICD-10-CM

## 2024-12-09 DIAGNOSIS — M54.9 BACK PAIN, UNSPECIFIED BACK LOCATION, UNSPECIFIED BACK PAIN LATERALITY, UNSPECIFIED CHRONICITY: ICD-10-CM

## 2024-12-09 DIAGNOSIS — E66.09 CLASS 2 OBESITY DUE TO EXCESS CALORIES WITHOUT SERIOUS COMORBIDITY WITH BODY MASS INDEX (BMI) OF 39.0 TO 39.9 IN ADULT: ICD-10-CM

## 2024-12-09 DIAGNOSIS — G89.29 CHRONIC LOW BACK PAIN, UNSPECIFIED BACK PAIN LATERALITY, UNSPECIFIED WHETHER SCIATICA PRESENT: ICD-10-CM

## 2024-12-09 LAB — POC HEMOGLOBIN A1C: 6 % (ref 4.2–6.5)

## 2024-12-09 PROCEDURE — 1160F RVW MEDS BY RX/DR IN RCRD: CPT | Performed by: FAMILY MEDICINE

## 2024-12-09 PROCEDURE — 1123F ACP DISCUSS/DSCN MKR DOCD: CPT | Performed by: FAMILY MEDICINE

## 2024-12-09 PROCEDURE — 1036F TOBACCO NON-USER: CPT | Performed by: FAMILY MEDICINE

## 2024-12-09 PROCEDURE — 3008F BODY MASS INDEX DOCD: CPT | Performed by: FAMILY MEDICINE

## 2024-12-09 PROCEDURE — 1158F ADVNC CARE PLAN TLK DOCD: CPT | Performed by: FAMILY MEDICINE

## 2024-12-09 PROCEDURE — 99214 OFFICE O/P EST MOD 30 MIN: CPT | Performed by: FAMILY MEDICINE

## 2024-12-09 PROCEDURE — 3044F HG A1C LEVEL LT 7.0%: CPT | Performed by: FAMILY MEDICINE

## 2024-12-09 PROCEDURE — 3074F SYST BP LT 130 MM HG: CPT | Performed by: FAMILY MEDICINE

## 2024-12-09 PROCEDURE — 83036 HEMOGLOBIN GLYCOSYLATED A1C: CPT | Performed by: FAMILY MEDICINE

## 2024-12-09 PROCEDURE — RXMED WILLOW AMBULATORY MEDICATION CHARGE

## 2024-12-09 PROCEDURE — 3078F DIAST BP <80 MM HG: CPT | Performed by: FAMILY MEDICINE

## 2024-12-09 PROCEDURE — 3060F POS MICROALBUMINURIA REV: CPT | Performed by: FAMILY MEDICINE

## 2024-12-09 PROCEDURE — 3048F LDL-C <100 MG/DL: CPT | Performed by: FAMILY MEDICINE

## 2024-12-09 PROCEDURE — 1159F MED LIST DOCD IN RCRD: CPT | Performed by: FAMILY MEDICINE

## 2024-12-09 RX ORDER — PREGABALIN 150 MG/1
150 CAPSULE ORAL 3 TIMES DAILY
Qty: 270 CAPSULE | Refills: 1 | Status: SHIPPED | OUTPATIENT
Start: 2024-12-09

## 2024-12-09 RX ORDER — LEVOTHYROXINE SODIUM 50 UG/1
50 TABLET ORAL DAILY
Qty: 90 TABLET | Refills: 1 | Status: SHIPPED | OUTPATIENT
Start: 2024-12-09

## 2024-12-09 RX ORDER — TRIAMTERENE/HYDROCHLOROTHIAZID 37.5-25 MG
1 TABLET ORAL
Qty: 90 TABLET | Refills: 1 | Status: SHIPPED | OUTPATIENT
Start: 2024-12-09 | End: 2025-06-07

## 2024-12-09 RX ORDER — TRAMADOL HYDROCHLORIDE 50 MG/1
50 TABLET ORAL EVERY 6 HOURS PRN
Qty: 120 TABLET | Refills: 2 | Status: SHIPPED | OUTPATIENT
Start: 2024-12-09

## 2024-12-09 RX ORDER — OMEPRAZOLE 40 MG/1
40 CAPSULE, DELAYED RELEASE ORAL
Qty: 90 CAPSULE | Refills: 1 | Status: SHIPPED | OUTPATIENT
Start: 2024-12-09

## 2024-12-09 RX ORDER — HYDROXYCHLOROQUINE SULFATE 200 MG/1
200 TABLET, FILM COATED ORAL
Qty: 180 TABLET | Refills: 1 | Status: SHIPPED | OUTPATIENT
Start: 2024-12-09

## 2024-12-09 RX ORDER — NITROGLYCERIN 20 MG/G
OINTMENT TOPICAL
Qty: 30 G | Refills: 0 | Status: CANCELLED | OUTPATIENT
Start: 2024-12-09

## 2024-12-09 RX ORDER — METOPROLOL SUCCINATE 25 MG/1
25 TABLET, EXTENDED RELEASE ORAL DAILY
Qty: 90 TABLET | Refills: 1 | Status: SHIPPED | OUTPATIENT
Start: 2024-12-09

## 2024-12-09 RX ORDER — DULOXETIN HYDROCHLORIDE 60 MG/1
60 CAPSULE, DELAYED RELEASE ORAL DAILY
Qty: 90 CAPSULE | Refills: 1 | Status: SHIPPED | OUTPATIENT
Start: 2024-12-09

## 2024-12-09 NOTE — PROGRESS NOTES
Subjective   Patient ID: Marianna Buck is a 69 y.o. female who presents for Diabetes, Hypertension, Pain, and Hypothyroidism.    HPI  Patient presents today for pain follow up.   Rates the pain a 5/10 over the past 7 days.   Reports that the medication gives 50% pain control/relief.   [place] today. Controlled substance agreement signed 8/16/24.   Last took medication.     HTN & DM follow up  Denies chest pain,SOB, swelling, headaches, lightheadedness or dizziness.   Eats a generally healthy diet, Exercises.   Does not check BP at home   Does Glucose at home   Medication working well including metformin, Lantus,  Today, A1c is 6.0%.   Blood sugar was low in January    Hypothyroidism  Taking Synthroid 50 mcg  Taking on an empty stomach  No significant weight loss/gain  No heat/cold intolerances  No increase in hair loss/dry skin    She had a flare up of fibromyalgia last Sunday, could not stand up on her right leg due to pain       No other concern/question   Review of systems  ; Patient seen today for exam denies any problems with headaches or vision, denies any shortness of breath chest pain nausea or vomiting, no black stool no blood in the stool no heartburn type symptoms denies any problems with constipation or diarrhea, and no dysuria-type symptoms    The patient's allergies medications were reviewed with them today    The patient's social family and surgical history or also reviewed here today, along with her past medical history.     Objective     Alert and active in  no acute distress  HEENT TMs clear oropharynx negative nares clear no drainage noted neck supple  With no adenopathy   Heart regular rate and rhythm without murmur and no carotid bruits  Lungs- clear to auscultation bilaterally, no wheeze or rhonchi noted  Thyroid -negative masses or nodularity  Abdomen- soft times four quadrants , bowel sounds positive no masses or organomegaly, negative tenderness guarding or rebound  Neurological  "exam unremarkable- DTRs in upper and lower extremities within normal limits.   skin -no lesions noted      /76 (BP Location: Right arm, Patient Position: Sitting, BP Cuff Size: Adult long)   Pulse 72   Temp 36.4 °C (97.5 °F) (Temporal)   Resp 16   Ht 1.676 m (5' 6\")   Wt 110 kg (243 lb)   SpO2 97%   BMI 39.22 kg/m²     Allergies   Allergen Reactions    Oxycodone-Acetaminophen Other and Unknown     :\"sensitivity\"    Sulfa (Sulfonamide Antibiotics) Other     Adverse reaction    Codeine Rash       Assessment/Plan   Problem List Items Addressed This Visit          Cardiac and Vasculature    HTN (hypertension)    Relevant Medications    metoprolol succinate XL (Toprol-XL) 25 mg 24 hr tablet       Endocrine/Metabolic    Hypothyroidism    Relevant Medications    levothyroxine (Synthroid, Levoxyl) 50 mcg tablet    BMI 39.0-39.9,adult    Class 2 obesity due to excess calories without serious comorbidity with body mass index (BMI) of 39.0 to 39.9 in adult    Type 2 diabetes mellitus without complication, without long-term current use of insulin (Multi)    Relevant Orders    POCT glycosylated hemoglobin (Hb A1C) manually resulted (Completed)       Gastrointestinal and Abdominal    GERD (gastroesophageal reflux disease)    Relevant Medications    omeprazole (PriLOSEC) 40 mg DR capsule       Genitourinary and Reproductive    UTI (urinary tract infection)    Relevant Medications    triamterene-hydrochlorothiazid (Maxzide-25) 37.5-25 mg tablet       Multi-system (Lupus, Sarcoid)    Rheumatoid arthritis    Relevant Medications    hydroxychloroquine (Plaquenil) 200 mg tablet       Musculoskeletal and Injuries    Chronic back pain    Fibromyalgia    Relevant Medications    pregabalin (Lyrica) 150 mg capsule    DULoxetine (Cymbalta) 60 mg DR capsule    Osteoarthritis of knees, bilateral     Other Visit Diagnoses       Back pain, unspecified back location, unspecified back pain laterality, unspecified chronicity        " Relevant Medications    traMADol (Ultram) 50 mg tablet    Left knee pain, unspecified chronicity        Relevant Medications    traMADol (Ultram) 50 mg tablet          Refilled Maxzide, Cymbalta, Lyrica, Tramadol, Synthroid, Prilosec, Toprol XL, Plaquenil today    Refilled nitroglycerine ointment today    If anything worsens or changes please call us at once, follow up in the office as planned,       Scribe Attestation  By signing my name below, INorma, Scrmikala   attest that this documentation has been prepared under the direction and in the presence of Tom Bingham DO.

## 2024-12-16 ENCOUNTER — PHARMACY VISIT (OUTPATIENT)
Dept: PHARMACY | Facility: CLINIC | Age: 69
End: 2024-12-16
Payer: MEDICARE

## 2024-12-19 PROCEDURE — RXMED WILLOW AMBULATORY MEDICATION CHARGE

## 2024-12-23 ENCOUNTER — APPOINTMENT (OUTPATIENT)
Dept: PHARMACY | Facility: HOSPITAL | Age: 69
End: 2024-12-23
Payer: MEDICARE

## 2024-12-23 DIAGNOSIS — E11.65 TYPE 2 DIABETES MELLITUS WITH HYPERGLYCEMIA, WITHOUT LONG-TERM CURRENT USE OF INSULIN: ICD-10-CM

## 2024-12-23 NOTE — ASSESSMENT & PLAN NOTE
Is pt at goal? Yes, 6%   Patient's SMBGs are at goal.  Rationale for plan: Patient A1c well below goal, tolerating Ozempic well. Some lows in the evenings - encouraged to check sugar before bed and have a small snack if sugar <130.      Medication Changes:  CONTINUE  Ozempic 2mg weekly       Diabetes Education  Rule of 15: eating ~15 g of carbs when BG less than 80 (half cup juice, 3-4 glucose tabs).  Recognize symptoms of high and low blood sugar.   Eat a realistic healthy diet consisting of fruits, vegetables, fiber, protein food choices on a regular basis and be aware of portion/serving sizes. Reduce carbohydrate consumption and always consume with protein and fat. Avoid foods high in saturated/trans fat, high salt content, and sweets and beverages with added sugars.  Limit alcohol consumption; alcohol may affect your blood sugar and cause hypoglycemia.   Stay active and incorporate ~30 mins of exercise into your daily routine to manage your weight and increase the body's acceptance of insulin.  Ozempic Education:  Counseled patient on Ozempic MOA, expectations, side effects, duration of therapy, administration, and monitoring parameters.  Counseled patient on the benefits of GLP-1ra, such as cardiovascular risk reduction, glycemic control, and weight loss potential.  Provided detailed dosing and administration counseling to ensure proper technique.   Reviewed Ozempic titration schedule, starting with 0.25 mg once weekly to 0.5 mg, 1 mg, and if tolerated 2 mg.  Reviewed storage requirements of Ozempic when not in use, and when to administer the medication if a dose is missed.  Discussed risks of GLP1ra including risk of pancreatitis, MTC and worsening of DR  Advised patient that they may experience improved satiety after meals and portion sizes of meals may be reduced as doses of Ozempic increase.

## 2024-12-23 NOTE — PROGRESS NOTES
Patient ID: Marianna Buck is a 69 y.o. female who presents for Diabetes   Pt is here for follow-up appointment.    Referring Provider: Tom Bingham DO   Last visit with PCP: 12.9.24  Next appt: Not scheduled, encouraged    Verbal consent to manage patient's drug therapy was obtained from patient. They were informed they may decline to participate or withdraw from participation in pharmacy services at any time. Patient is agreeable to detailed voice messages if unable to be reached.       Subjective   Treatment Adherence:   Patient did take medications today.   Number of missed doses in last 7 days: 0.  Why? 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  Optometry exam current (within 1year):  Yes, July 2024 per pt  Most recent visit in Podiatry was on - 6.24.24     Current diabetic medications include:  Ozempic 2mg weekly    Patient confirms above regimen.     DEXCOM G7 -  Sensor Capture   14 day capture rate = 64%   Average Blood Sugars  14 day average = 125   Time in target (14 days)  Very High >250 = 0%  High         181-250 = 3%  Target       = 96%  Low          54-69 = 1%  Very Low  <54 = 0%   Number of Low Events (14 days)  #  of times BG < 70mg/dL = 2 in the late evening (10p-12a)       Any episodes of hypoglycemia? No  Did patient treat episode of hypoglycemia appropriately? N/A    Does pt have proteinuria? No, NONE FOUND    If appropriate, is patient on ACEi/ARB? N/A    Secondary Prevention  Statin? No, Patient refuses statin .  ACE-I/ARB? N/A.  Aspirin? Yes, 81mg daily .    Pertinent PMH Review:  PMH of Pancreatitis: No  PMH of Retinopathy: No  PMH of Urinary Tract  Infections: Yes  PMH of MTC: No      Review of Systems    Objective     There were no vitals taken for this visit.     Labs  Lab Results   Component Value Date    BILITOT 0.5 01/31/2024    CALCIUM 9.3 01/31/2024    CO2 29 01/31/2024    CL 98 01/31/2024    CREATININE 0.98 01/31/2024    GLUCOSE 110 (H) 01/31/2024    ALKPHOS 68 01/31/2024    K 4.6 01/31/2024    PROT 7.4 01/31/2024     01/31/2024    AST 33 01/31/2024    ALT 32 01/31/2024    BUN 17 01/31/2024    ANIONGAP 17 01/31/2024    MG 2.00 06/04/2020    ALBUMIN 3.9 01/31/2024    LIPASE 55 09/29/2018    GFRF 88 03/31/2023     Lab Results   Component Value Date    TRIG 264 (H) 01/31/2024    CHOL 157 01/31/2024    LDLCALC 64 01/31/2024    HDL 40.6 01/31/2024     Lab Results   Component Value Date    HGBA1C 6.0 12/09/2024       Current Outpatient Medications   Medication Instructions    aspirin 81 mg    blood-glucose sensor (Dexcom G7 Sensor) device Place one sensor every 10 days on back of arm to check sugars.    celecoxib (CELEBREX) 200 mg, oral, 2 times daily Nitrate    docusate sodium (COLACE) 100 mg, 2 times daily    DULoxetine (CYMBALTA) 60 mg, oral, Daily    estradiol (ESTRACE) 0.5 mg, oral, Daily    hydroxychloroquine (Plaquenil) 200 mg tablet Take 1 tablet (200 mg) by mouth in the morning and in the evening.    levothyroxine (SYNTHROID, LEVOXYL) 50 mcg, oral, Daily    metoprolol succinate XL (TOPROL-XL) 25 mg, oral, Daily    metroNIDAZOLE (Metrogel) 1 % gel 1 Application, Daily    omeprazole (PRILOSEC) 40 mg, oral, Daily before breakfast    Ozempic 2 mg, subcutaneous, Once Weekly    pregabalin (Lyrica) 150 mg capsule Take 1 capsule (150 mg) by mouth 3 times a day (in the morning, evening, and before bed).    traMADol (ULTRAM) 50 mg, oral, Every 6 hours PRN    triamterene-hydrochlorothiazid (Maxzide-25) 37.5-25 mg tablet 1 tablet, oral, Daily before breakfast         Assessment/Plan   Problem List Items Addressed This Visit             ICD-10-CM    Type 2  diabetes mellitus with hyperglycemia, without long-term current use of insulin E11.65     Is pt at goal? Yes, 6%   Patient's SMBGs are at goal.  Rationale for plan: Patient A1c well below goal, tolerating Ozempic well. Some lows in the evenings - encouraged to check sugar before bed and have a small snack if sugar <130.      Medication Changes:  CONTINUE  Ozempic 2mg weekly       Diabetes Education  Rule of 15: eating ~15 g of carbs when BG less than 80 (half cup juice, 3-4 glucose tabs).  Recognize symptoms of high and low blood sugar.   Eat a realistic healthy diet consisting of fruits, vegetables, fiber, protein food choices on a regular basis and be aware of portion/serving sizes. Reduce carbohydrate consumption and always consume with protein and fat. Avoid foods high in saturated/trans fat, high salt content, and sweets and beverages with added sugars.  Limit alcohol consumption; alcohol may affect your blood sugar and cause hypoglycemia.   Stay active and incorporate ~30 mins of exercise into your daily routine to manage your weight and increase the body's acceptance of insulin.  Ozempic Education:  Counseled patient on Ozempic MOA, expectations, side effects, duration of therapy, administration, and monitoring parameters.  Counseled patient on the benefits of GLP-1ra, such as cardiovascular risk reduction, glycemic control, and weight loss potential.  Provided detailed dosing and administration counseling to ensure proper technique.   Reviewed Ozempic titration schedule, starting with 0.25 mg once weekly to 0.5 mg, 1 mg, and if tolerated 2 mg.  Reviewed storage requirements of Ozempic when not in use, and when to administer the medication if a dose is missed.  Discussed risks of GLP1ra including risk of pancreatitis, MTC and worsening of DR  Advised patient that they may experience improved satiety after meals and portion sizes of meals may be reduced as doses of Ozempic increase.         Relevant Orders     Referral to Clinical Pharmacy       Immunizations needed: DTaP, Influenza, and COVD, RSV    Labs ordered:  none     Referrals:  none     Follow-up: 1 mo to review new insurance     Patient was provided with PharmD phone number and encouraged to reach out with any questions or concerns Prior to next appointment or ask provider for another pharmacy referral.    Time spent with pt: Total length of time 10 (minutes) of the encounter and more than 50% was spent counseling the patient.    Thank you for allowing to take part in the care of this patient.    Dorcas Mills PharmD, ESPINOZA  Clinical Pharmacist  [phone]  [email or web address]    Continue all meds under the continuation of care with the referring provider and clinical pharmacy team.    Verbal consent to manage patient's drug therapy was obtained from the patient. They were informed they may decline to participate or withdraw from participation in pharmacy services at any time.

## 2025-01-03 PROCEDURE — RXMED WILLOW AMBULATORY MEDICATION CHARGE

## 2025-01-04 ENCOUNTER — PHARMACY VISIT (OUTPATIENT)
Dept: PHARMACY | Facility: CLINIC | Age: 70
End: 2025-01-04
Payer: COMMERCIAL

## 2025-01-13 ENCOUNTER — LAB (OUTPATIENT)
Dept: LAB | Facility: LAB | Age: 70
End: 2025-01-13
Payer: MEDICARE

## 2025-01-13 DIAGNOSIS — E03.9 ADULT HYPOTHYROIDISM: ICD-10-CM

## 2025-01-13 DIAGNOSIS — M06.9 RHEUMATOID ARTHRITIS INVOLVING MULTIPLE SITES, UNSPECIFIED WHETHER RHEUMATOID FACTOR PRESENT (MULTI): ICD-10-CM

## 2025-01-13 DIAGNOSIS — E55.9 VITAMIN D DEFICIENCY: ICD-10-CM

## 2025-01-13 DIAGNOSIS — M15.0 PRIMARY OSTEOARTHRITIS INVOLVING MULTIPLE JOINTS: ICD-10-CM

## 2025-01-13 LAB
25(OH)D3 SERPL-MCNC: 71 NG/ML (ref 30–100)
ALBUMIN SERPL BCP-MCNC: 4 G/DL (ref 3.4–5)
ALP SERPL-CCNC: 74 U/L (ref 33–136)
ALT SERPL W P-5'-P-CCNC: 27 U/L (ref 7–45)
ANION GAP SERPL CALC-SCNC: 11 MMOL/L (ref 10–20)
AST SERPL W P-5'-P-CCNC: 26 U/L (ref 9–39)
BASOPHILS # BLD AUTO: 0.05 X10*3/UL (ref 0–0.1)
BASOPHILS NFR BLD AUTO: 0.9 %
BILIRUB SERPL-MCNC: 0.4 MG/DL (ref 0–1.2)
BUN SERPL-MCNC: 12 MG/DL (ref 6–23)
CALCIUM SERPL-MCNC: 9 MG/DL (ref 8.6–10.3)
CHLORIDE SERPL-SCNC: 99 MMOL/L (ref 98–107)
CK SERPL-CCNC: 136 U/L (ref 0–215)
CO2 SERPL-SCNC: 32 MMOL/L (ref 21–32)
CREAT SERPL-MCNC: 0.83 MG/DL (ref 0.5–1.05)
CRP SERPL-MCNC: 0.35 MG/DL
EGFRCR SERPLBLD CKD-EPI 2021: 77 ML/MIN/1.73M*2
EOSINOPHIL # BLD AUTO: 0.15 X10*3/UL (ref 0–0.7)
EOSINOPHIL NFR BLD AUTO: 2.8 %
ERYTHROCYTE [DISTWIDTH] IN BLOOD BY AUTOMATED COUNT: 15.5 % (ref 11.5–14.5)
ERYTHROCYTE [SEDIMENTATION RATE] IN BLOOD BY WESTERGREN METHOD: 12 MM/H (ref 0–30)
GLUCOSE SERPL-MCNC: 105 MG/DL (ref 74–99)
HCT VFR BLD AUTO: 41.6 % (ref 36–46)
HGB BLD-MCNC: 12.8 G/DL (ref 12–16)
IMM GRANULOCYTES # BLD AUTO: 0.01 X10*3/UL (ref 0–0.7)
IMM GRANULOCYTES NFR BLD AUTO: 0.2 % (ref 0–0.9)
LYMPHOCYTES # BLD AUTO: 1.99 X10*3/UL (ref 1.2–4.8)
LYMPHOCYTES NFR BLD AUTO: 37.2 %
MCH RBC QN AUTO: 24.6 PG (ref 26–34)
MCHC RBC AUTO-ENTMCNC: 30.8 G/DL (ref 32–36)
MCV RBC AUTO: 80 FL (ref 80–100)
MONOCYTES # BLD AUTO: 0.66 X10*3/UL (ref 0.1–1)
MONOCYTES NFR BLD AUTO: 12.3 %
NEUTROPHILS # BLD AUTO: 2.49 X10*3/UL (ref 1.2–7.7)
NEUTROPHILS NFR BLD AUTO: 46.6 %
NRBC BLD-RTO: 0 /100 WBCS (ref 0–0)
PLATELET # BLD AUTO: 207 X10*3/UL (ref 150–450)
POTASSIUM SERPL-SCNC: 4.8 MMOL/L (ref 3.5–5.3)
PROT SERPL-MCNC: 7.3 G/DL (ref 6.4–8.2)
RBC # BLD AUTO: 5.21 X10*6/UL (ref 4–5.2)
RHEUMATOID FACT SER NEPH-ACNC: 11 IU/ML (ref 0–15)
SODIUM SERPL-SCNC: 137 MMOL/L (ref 136–145)
TSH SERPL-ACNC: 1.26 MIU/L (ref 0.44–3.98)
WBC # BLD AUTO: 5.4 X10*3/UL (ref 4.4–11.3)

## 2025-01-13 PROCEDURE — 85025 COMPLETE CBC W/AUTO DIFF WBC: CPT

## 2025-01-13 PROCEDURE — 82550 ASSAY OF CK (CPK): CPT

## 2025-01-13 PROCEDURE — 86140 C-REACTIVE PROTEIN: CPT

## 2025-01-13 PROCEDURE — 82306 VITAMIN D 25 HYDROXY: CPT

## 2025-01-13 PROCEDURE — 86431 RHEUMATOID FACTOR QUANT: CPT

## 2025-01-13 PROCEDURE — 81381 HLA I TYPING 1 ALLELE HR: CPT

## 2025-01-13 PROCEDURE — 86200 CCP ANTIBODY: CPT

## 2025-01-13 PROCEDURE — 85652 RBC SED RATE AUTOMATED: CPT

## 2025-01-13 PROCEDURE — 80053 COMPREHEN METABOLIC PANEL: CPT

## 2025-01-13 PROCEDURE — 84443 ASSAY THYROID STIM HORMONE: CPT

## 2025-01-14 LAB — CCP IGG SERPL-ACNC: <1 U/ML

## 2025-01-16 LAB — HLAB27 TYPING: NEGATIVE

## 2025-01-23 ENCOUNTER — PHARMACY VISIT (OUTPATIENT)
Dept: PHARMACY | Facility: CLINIC | Age: 70
End: 2025-01-23
Payer: COMMERCIAL

## 2025-01-23 PROCEDURE — RXMED WILLOW AMBULATORY MEDICATION CHARGE

## 2025-01-24 ENCOUNTER — APPOINTMENT (OUTPATIENT)
Dept: PHARMACY | Facility: HOSPITAL | Age: 70
End: 2025-01-24
Payer: MEDICARE

## 2025-01-24 DIAGNOSIS — E11.65 TYPE 2 DIABETES MELLITUS WITH HYPERGLYCEMIA, WITHOUT LONG-TERM CURRENT USE OF INSULIN: ICD-10-CM

## 2025-01-24 NOTE — PROGRESS NOTES
Patient ID: Marianna Buck is a 69 y.o. female who presents for Diabetes   Pt is here for follow-up appointment.    Referring Provider: Tom Bingham DO   Last visit with PCP: 12.9.24    Verbal consent to manage patient's drug therapy was obtained from patient. They were informed they may decline to participate or withdraw from participation in pharmacy services at any time. Patient is agreeable to detailed voice messages if unable to be reached.       Subjective   Treatment Adherence:   Patient did take medications today.   Number of missed doses in last 7 days: 0.  Why? N/A     [facility]  Can patient afford prescribed medications: Yes,       Interval History:  Skin cancer treatment since July, last surgery 10/2/24      HPI  DIABETES MELLITUS TYPE 2:    Known diabetic complications: none.  Optometry exam current (within 1year):  Yes, July 2024 per pt  Most recent visit in Podiatry was on - 6.24.24     Current diabetic medications include:  Ozempic 2mg weekly    Patient confirms above regimen.     DEXCOM G7 -  Sensor Capture   14 day capture rate = 75%   Average Blood Sugars  14 day average = 143   Time in target (14 days)  Very High >250 = 0%  High         181-250 = 15%  Target       = 85%  Low          54-69 = 0%  Very Low  <54 = 0%       Any episodes of hypoglycemia? No  Did patient treat episode of hypoglycemia appropriately? N/A    Does pt have proteinuria? No, NONE FOUND    If appropriate, is patient on ACEi/ARB? N/A    Secondary Prevention  Statin? No, Patient refuses statin .  ACE-I/ARB? N/A.  Aspirin? Yes, 81mg daily .    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      Labs  Lab Results   Component Value Date    BILITOT 0.4 01/13/2025    CALCIUM 9.0 01/13/2025    CO2 32 01/13/2025    CL 99 01/13/2025    CREATININE 0.83 01/13/2025    GLUCOSE 105 (H) 01/13/2025    ALKPHOS 74 01/13/2025    K 4.8 01/13/2025    PROT 7.3 01/13/2025     01/13/2025    AST 26 01/13/2025    ALT 27 01/13/2025    BUN 12 01/13/2025    ANIONGAP 11 01/13/2025    MG 2.00 06/04/2020    ALBUMIN 4.0 01/13/2025    LIPASE 55 09/29/2018    GFRF 88 03/31/2023     Lab Results   Component Value Date    TRIG 264 (H) 01/31/2024    CHOL 157 01/31/2024    LDLCALC 64 01/31/2024    HDL 40.6 01/31/2024     Lab Results   Component Value Date    HGBA1C 6.0 12/09/2024       Current Outpatient Medications   Medication Instructions    aspirin 81 mg    blood-glucose sensor (Dexcom G7 Sensor) device Place one sensor every 10 days on back of arm to check sugars.    celecoxib (CELEBREX) 200 mg, oral, 2 times daily Nitrate    docusate sodium (COLACE) 100 mg, 2 times daily    DULoxetine (CYMBALTA) 60 mg, oral, Daily    estradiol (ESTRACE) 0.5 mg, oral, Daily    hydroxychloroquine (Plaquenil) 200 mg tablet Take 1 tablet (200 mg) by mouth in the morning and in the evening.    levothyroxine (SYNTHROID, LEVOXYL) 50 mcg, oral, Daily    metoprolol succinate XL (TOPROL-XL) 25 mg, oral, Daily    metroNIDAZOLE (Metrogel) 1 % gel 1 Application, Daily    omeprazole (PRILOSEC) 40 mg, oral, Daily before breakfast    Ozempic 2 mg, subcutaneous, Once Weekly    pregabalin (Lyrica) 150 mg capsule Take 1 capsule (150 mg) by mouth 3 times a day (in the morning, evening, and before bed).    traMADol (ULTRAM) 50 mg, oral, Every 6 hours PRN    triamterene-hydrochlorothiazid (Maxzide-25) 37.5-25 mg tablet 1 tablet, oral, Daily before breakfast         Assessment/Plan   Problem List Items Addressed This Visit             ICD-10-CM    Type 2 diabetes mellitus with hyperglycemia, without long-term current use of insulin E11.65     Is pt at goal? Yes, 6%    Patient's SMBGs are at goal. No changes needed today.     Medication Changes:  CONTINUE  Ozempic 2mg weekly       Diabetes Education  Rule of 15: eating ~15 g of carbs when BG less than 80 (half cup juice, 3-4 glucose tabs).  Recognize symptoms of high and low blood sugar.   Eat a realistic healthy diet consisting of fruits, vegetables, fiber, protein food choices on a regular basis and be aware of portion/serving sizes. Reduce carbohydrate consumption and always consume with protein and fat. Avoid foods high in saturated/trans fat, high salt content, and sweets and beverages with added sugars.  Limit alcohol consumption; alcohol may affect your blood sugar and cause hypoglycemia.   Stay active and incorporate ~30 mins of exercise into your daily routine to manage your weight and increase the body's acceptance of insulin.  Ozempic Education:  Counseled patient on Ozempic MOA, expectations, side effects, duration of therapy, administration, and monitoring parameters.  Counseled patient on the benefits of GLP-1ra, such as cardiovascular risk reduction, glycemic control, and weight loss potential.  Provided detailed dosing and administration counseling to ensure proper technique.   Reviewed Ozempic titration schedule, starting with 0.25 mg once weekly to 0.5 mg, 1 mg, and if tolerated 2 mg.  Reviewed storage requirements of Ozempic when not in use, and when to administer the medication if a dose is missed.  Discussed risks of GLP1ra including risk of pancreatitis, MTC and worsening of DR  Advised patient that they may experience improved satiety after meals and portion sizes of meals may be reduced as doses of Ozempic increase.         Relevant Orders    Referral to Clinical Pharmacy         Immunizations needed: DTaP, Influenza, and COVD, RSV    Labs ordered:  none     Referrals:  none     Follow-up: 5 weeks     Patient was provided with PharmD phone number and encouraged to reach out with any questions or concerns  Prior to next appointment or ask provider for another pharmacy referral.    Time spent with pt: Total length of time 10 (minutes) of the encounter and more than 50% was spent counseling the patient.    Thank you for allowing to take part in the care of this patient.    Dorcas Mills, PharmD, ESPINOZA  Clinical Pharmacist  [phone]  [email or web address]    Continue all meds under the continuation of care with the referring provider and clinical pharmacy team.    Verbal consent to manage patient's drug therapy was obtained from the patient. They were informed they may decline to participate or withdraw from participation in pharmacy services at any time.

## 2025-01-24 NOTE — ASSESSMENT & PLAN NOTE
Is pt at goal? Yes, 6%   Patient's SMBGs are at goal. No changes needed today.     Medication Changes:  CONTINUE  Ozempic 2mg weekly       Diabetes Education  Rule of 15: eating ~15 g of carbs when BG less than 80 (half cup juice, 3-4 glucose tabs).  Recognize symptoms of high and low blood sugar.   Eat a realistic healthy diet consisting of fruits, vegetables, fiber, protein food choices on a regular basis and be aware of portion/serving sizes. Reduce carbohydrate consumption and always consume with protein and fat. Avoid foods high in saturated/trans fat, high salt content, and sweets and beverages with added sugars.  Limit alcohol consumption; alcohol may affect your blood sugar and cause hypoglycemia.   Stay active and incorporate ~30 mins of exercise into your daily routine to manage your weight and increase the body's acceptance of insulin.  Ozempic Education:  Counseled patient on Ozempic MOA, expectations, side effects, duration of therapy, administration, and monitoring parameters.  Counseled patient on the benefits of GLP-1ra, such as cardiovascular risk reduction, glycemic control, and weight loss potential.  Provided detailed dosing and administration counseling to ensure proper technique.   Reviewed Ozempic titration schedule, starting with 0.25 mg once weekly to 0.5 mg, 1 mg, and if tolerated 2 mg.  Reviewed storage requirements of Ozempic when not in use, and when to administer the medication if a dose is missed.  Discussed risks of GLP1ra including risk of pancreatitis, MTC and worsening of DR  Advised patient that they may experience improved satiety after meals and portion sizes of meals may be reduced as doses of Ozempic increase.

## 2025-01-27 DIAGNOSIS — E11.9 TYPE 2 DIABETES MELLITUS WITHOUT COMPLICATION, WITHOUT LONG-TERM CURRENT USE OF INSULIN (MULTI): ICD-10-CM

## 2025-01-27 RX ORDER — BLOOD-GLUCOSE SENSOR
EACH MISCELLANEOUS
Qty: 3 EACH | Refills: 11 | Status: SHIPPED | OUTPATIENT
Start: 2025-01-27

## 2025-01-31 PROCEDURE — RXMED WILLOW AMBULATORY MEDICATION CHARGE

## 2025-02-05 ENCOUNTER — PHARMACY VISIT (OUTPATIENT)
Dept: PHARMACY | Facility: CLINIC | Age: 70
End: 2025-02-05
Payer: COMMERCIAL

## 2025-02-05 PROCEDURE — RXMED WILLOW AMBULATORY MEDICATION CHARGE

## 2025-02-17 PROCEDURE — RXMED WILLOW AMBULATORY MEDICATION CHARGE

## 2025-02-20 ENCOUNTER — PHARMACY VISIT (OUTPATIENT)
Dept: PHARMACY | Facility: CLINIC | Age: 70
End: 2025-02-20
Payer: COMMERCIAL

## 2025-02-27 NOTE — PROGRESS NOTES
Patient ID: Marianna Buck is a 69 y.o. female who presents for Diabetes   Pt is here for follow-up appointment.    Referring Provider: Tom Bingham DO   Last visit with PCP: 12.9.24    Verbal consent to manage patient's drug therapy was obtained from patient. They were informed they may decline to participate or withdraw from participation in pharmacy services at any time. Patient is agreeable to detailed voice messages if unable to be reached.       Subjective   Treatment Adherence:   Patient did take medications today.   Number of missed doses in last 7 days: 0.  Why? N/A     [facility]  Can patient afford prescribed medications: Yes,       Interval History:  Has been having some sensor issues  Skin cancer treatment, last surgery 10/2/24      HPI  DIABETES MELLITUS TYPE 2:    Known diabetic complications: none.  Optometry exam current (within 1year):  Yes, July 2024 per pt  Most recent visit in Podiatry was on - 6.24.24     Current diabetic medications include:  Ozempic 2mg weekly    DEXCOM G7   Sensor Capture   14 day capture rate = 63%   Average Blood Sugars  14 day average = 133   Time in target (14 days)  Very High >250 = 0%  High         181-250 = 5%  Target       = 94%  Low          54-69 = 0%  Very Low  <54 = 0%       Any episodes of hypoglycemia? No  Did patient treat episode of hypoglycemia appropriately? N/A    Does pt have proteinuria? No, NONE FOUND    If appropriate, is patient on ACEi/ARB? N/A    Secondary Prevention  Statin? No, Patient refuses statin .  ACE-I/ARB? N/A.  Aspirin? Yes, 81mg daily .    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     Labs  Lab  Results   Component Value Date    BILITOT 0.4 01/13/2025    CALCIUM 9.0 01/13/2025    CO2 32 01/13/2025    CL 99 01/13/2025    CREATININE 0.83 01/13/2025    GLUCOSE 105 (H) 01/13/2025    ALKPHOS 74 01/13/2025    K 4.8 01/13/2025    PROT 7.3 01/13/2025     01/13/2025    AST 26 01/13/2025    ALT 27 01/13/2025    BUN 12 01/13/2025    ANIONGAP 11 01/13/2025    MG 2.00 06/04/2020    ALBUMIN 4.0 01/13/2025    LIPASE 55 09/29/2018    GFRF 88 03/31/2023     Lab Results   Component Value Date    TRIG 264 (H) 01/31/2024    CHOL 157 01/31/2024    LDLCALC 64 01/31/2024    HDL 40.6 01/31/2024     Lab Results   Component Value Date    HGBA1C 6.0 12/09/2024       Current Outpatient Medications   Medication Instructions    aspirin 81 mg    blood-glucose sensor (Dexcom G7 Sensor) device Place one sensor every 10 days on back of arm to check sugars.    celecoxib (CELEBREX) 200 mg, oral, 2 times daily Nitrate    docusate sodium (COLACE) 100 mg, 2 times daily    DULoxetine (CYMBALTA) 60 mg, oral, Daily    estradiol (ESTRACE) 0.5 mg, oral, Daily    hydroxychloroquine (Plaquenil) 200 mg tablet Take 1 tablet (200 mg) by mouth in the morning and in the evening.    levothyroxine (SYNTHROID, LEVOXYL) 50 mcg, oral, Daily    metoprolol succinate XL (TOPROL-XL) 25 mg, oral, Daily    metroNIDAZOLE (Metrogel) 1 % gel 1 Application, Daily    omeprazole (PRILOSEC) 40 mg, oral, Daily before breakfast    Ozempic 2 mg, subcutaneous, Once Weekly    pregabalin (Lyrica) 150 mg capsule Take 1 capsule (150 mg) by mouth 3 times a day (in the morning, evening, and before bed).    traMADol (ULTRAM) 50 mg, oral, Every 6 hours PRN    triamterene-hydrochlorothiazid (Maxzide-25) 37.5-25 mg tablet 1 tablet, oral, Daily before breakfast         Assessment/Plan   Problem List Items Addressed This Visit             ICD-10-CM    Type 2 diabetes mellitus with hyperglycemia, without long-term current use of insulin E11.65     Is pt at goal? Yes, 6%   Patient's  SMBGs are at goal. No changes needed today.     Medication Changes:  CONTINUE  Ozempic 2mg weekly    Diabetes Education  Rule of 15: eating ~15 g of carbs when BG less than 80 (half cup juice, 3-4 glucose tabs).  Recognize symptoms of high and low blood sugar.   Eat a realistic healthy diet consisting of fruits, vegetables, fiber, protein food choices on a regular basis and be aware of portion/serving sizes. Reduce carbohydrate consumption and always consume with protein and fat. Avoid foods high in saturated/trans fat, high salt content, and sweets and beverages with added sugars.  Limit alcohol consumption; alcohol may affect your blood sugar and cause hypoglycemia.   Stay active and incorporate ~30 mins of exercise into your daily routine to manage your weight and increase the body's acceptance of insulin.  Ozempic Education:  Counseled patient on Ozempic MOA, expectations, side effects, duration of therapy, administration, and monitoring parameters.  Counseled patient on the benefits of GLP-1ra, such as cardiovascular risk reduction, glycemic control, and weight loss potential.  Provided detailed dosing and administration counseling to ensure proper technique.   Reviewed Ozempic titration schedule, starting with 0.25 mg once weekly to 0.5 mg, 1 mg, and if tolerated 2 mg.  Reviewed storage requirements of Ozempic when not in use, and when to administer the medication if a dose is missed.  Discussed risks of GLP1ra including risk of pancreatitis, MTC and worsening of DR  Advised patient that they may experience improved satiety after meals and portion sizes of meals may be reduced as doses of Ozempic increase.         Relevant Orders    Referral to Clinical Pharmacy     Immunizations needed: DTaP, Influenza, and COVD, RSV    Labs ordered:  none     Referrals:  none     Follow-up: 3/31     Patient was provided with PharmD phone number and encouraged to reach out with any questions or concerns Prior to next  appointment or ask provider for another pharmacy referral.    Time spent with pt: Total length of time 10 (minutes) of the encounter and more than 50% was spent counseling the patient.    Thank you for allowing to take part in the care of this patient.    Dorcas Mills PharmD, ESPINOZA  Clinical Pharmacist  [phone]  [email or web address]    Continue all meds under the continuation of care with the referring provider and clinical pharmacy team.    Verbal consent to manage patient's drug therapy was obtained from the patient. They were informed they may decline to participate or withdraw from participation in pharmacy services at any time.

## 2025-02-28 ENCOUNTER — APPOINTMENT (OUTPATIENT)
Dept: PHARMACY | Facility: HOSPITAL | Age: 70
End: 2025-02-28
Payer: MEDICARE

## 2025-02-28 ENCOUNTER — PHARMACY VISIT (OUTPATIENT)
Dept: PHARMACY | Facility: CLINIC | Age: 70
End: 2025-02-28
Payer: COMMERCIAL

## 2025-02-28 DIAGNOSIS — E11.65 TYPE 2 DIABETES MELLITUS WITH HYPERGLYCEMIA, WITHOUT LONG-TERM CURRENT USE OF INSULIN: ICD-10-CM

## 2025-02-28 PROCEDURE — RXMED WILLOW AMBULATORY MEDICATION CHARGE

## 2025-03-10 ENCOUNTER — APPOINTMENT (OUTPATIENT)
Dept: PRIMARY CARE | Facility: CLINIC | Age: 70
End: 2025-03-10
Payer: MEDICARE

## 2025-03-10 VITALS
WEIGHT: 251.01 LBS | SYSTOLIC BLOOD PRESSURE: 120 MMHG | HEART RATE: 79 BPM | HEIGHT: 65 IN | OXYGEN SATURATION: 98 % | TEMPERATURE: 97.8 F | BODY MASS INDEX: 41.82 KG/M2 | DIASTOLIC BLOOD PRESSURE: 50 MMHG

## 2025-03-10 DIAGNOSIS — M25.562 LEFT KNEE PAIN, UNSPECIFIED CHRONICITY: ICD-10-CM

## 2025-03-10 DIAGNOSIS — E66.813 CLASS 3 SEVERE OBESITY DUE TO EXCESS CALORIES WITH SERIOUS COMORBIDITY AND BODY MASS INDEX (BMI) OF 40.0 TO 44.9 IN ADULT: ICD-10-CM

## 2025-03-10 DIAGNOSIS — E11.65 TYPE 2 DIABETES MELLITUS WITH HYPERGLYCEMIA, WITHOUT LONG-TERM CURRENT USE OF INSULIN: ICD-10-CM

## 2025-03-10 DIAGNOSIS — M06.9 RHEUMATOID ARTHRITIS, INVOLVING UNSPECIFIED SITE, UNSPECIFIED WHETHER RHEUMATOID FACTOR PRESENT (MULTI): ICD-10-CM

## 2025-03-10 DIAGNOSIS — E11.42 DIABETIC POLYNEUROPATHY ASSOCIATED WITH TYPE 2 DIABETES MELLITUS (MULTI): ICD-10-CM

## 2025-03-10 DIAGNOSIS — Z00.00 MEDICARE ANNUAL WELLNESS VISIT, SUBSEQUENT: Primary | ICD-10-CM

## 2025-03-10 DIAGNOSIS — E66.01 CLASS 3 SEVERE OBESITY DUE TO EXCESS CALORIES WITH SERIOUS COMORBIDITY AND BODY MASS INDEX (BMI) OF 40.0 TO 44.9 IN ADULT: ICD-10-CM

## 2025-03-10 DIAGNOSIS — M54.9 BACK PAIN, UNSPECIFIED BACK LOCATION, UNSPECIFIED BACK PAIN LATERALITY, UNSPECIFIED CHRONICITY: ICD-10-CM

## 2025-03-10 LAB — POC HEMOGLOBIN A1C: 6.1 % (ref 4.2–6.5)

## 2025-03-10 PROCEDURE — 1036F TOBACCO NON-USER: CPT | Performed by: FAMILY MEDICINE

## 2025-03-10 PROCEDURE — 1170F FXNL STATUS ASSESSED: CPT | Performed by: FAMILY MEDICINE

## 2025-03-10 PROCEDURE — 1123F ACP DISCUSS/DSCN MKR DOCD: CPT | Performed by: FAMILY MEDICINE

## 2025-03-10 PROCEDURE — 3078F DIAST BP <80 MM HG: CPT | Performed by: FAMILY MEDICINE

## 2025-03-10 PROCEDURE — 99213 OFFICE O/P EST LOW 20 MIN: CPT | Performed by: FAMILY MEDICINE

## 2025-03-10 PROCEDURE — RXMED WILLOW AMBULATORY MEDICATION CHARGE

## 2025-03-10 PROCEDURE — G0439 PPPS, SUBSEQ VISIT: HCPCS | Performed by: FAMILY MEDICINE

## 2025-03-10 PROCEDURE — 3008F BODY MASS INDEX DOCD: CPT | Performed by: FAMILY MEDICINE

## 2025-03-10 PROCEDURE — 3074F SYST BP LT 130 MM HG: CPT | Performed by: FAMILY MEDICINE

## 2025-03-10 PROCEDURE — 1160F RVW MEDS BY RX/DR IN RCRD: CPT | Performed by: FAMILY MEDICINE

## 2025-03-10 PROCEDURE — 83036 HEMOGLOBIN GLYCOSYLATED A1C: CPT | Performed by: FAMILY MEDICINE

## 2025-03-10 PROCEDURE — 1159F MED LIST DOCD IN RCRD: CPT | Performed by: FAMILY MEDICINE

## 2025-03-10 RX ORDER — TRAMADOL HYDROCHLORIDE 50 MG/1
50 TABLET ORAL EVERY 6 HOURS PRN
Qty: 120 TABLET | Refills: 2 | Status: SHIPPED | OUTPATIENT
Start: 2025-03-10

## 2025-03-10 ASSESSMENT — ACTIVITIES OF DAILY LIVING (ADL)
MANAGING_FINANCES: INDEPENDENT
GROCERY_SHOPPING: INDEPENDENT
BATHING: INDEPENDENT
DOING_HOUSEWORK: INDEPENDENT
TAKING_MEDICATION: INDEPENDENT
DRESSING: INDEPENDENT

## 2025-03-10 NOTE — PROGRESS NOTES
Subjective   Patient ID: Marianna Buck is a 69 y.o. female who presents for Medicare Annual Wellness Visit Subsequent, Hypertension, Diabetes, and Hypothyroidism.  HPI    Patient presents today for AWV, HTN, DM and Hypothyroidism follow up. He does not eat a low sodium, low sugar diet. She does not exercise. She does not check her BP at home. She does not check her sugars at home. She does take his Synthroid on an empty stomach. Last eye exam was last year. Last dental exam was 2 months ago. Is not fasting for BW today. Last BW was on 1/13/25. Today, A1c is 6.1%.    Patient is taking Tramadol 50 mg for her back pain and left knee pain. She states it is working well. She also  admits to having hip pain which contributes to terrible groin pain as well.    She is on Semaglutide 2 mg weekly but states she is not losing weight. However, it is helping to keep her blood sugar under control. She denies any swelling in the legs. She is still getting hot flashes.       Review of systems  ; Patient seen today for exam denies any problems with headaches or vision, denies any shortness of breath chest pain nausea or vomiting, no black stool no blood in the stool no heartburn type symptoms denies any problems with constipation or diarrhea, and no dysuria-type symptoms    The patient's allergies medications were reviewed with them today    The patient's social family and surgical history or also reviewed here today, along with her past medical history.     Objective     Alert and active in  no acute distress  HEENT TMs clear oropharynx negative nares clear no drainage noted neck supple  With no adenopathy   Heart regular rate and rhythm without murmur and no carotid bruits  Lungs- clear to auscultation bilaterally, no wheeze or rhonchi noted  Thyroid -negative masses or nodularity  Abdomen- soft times four quadrants , bowel sounds positive no masses or organomegaly, negative tenderness guarding or rebound  Neurological exam  "unremarkable- DTRs in upper and lower extremities within normal limits.   skin -no lesions noted      /50 (BP Location: Right arm, Patient Position: Sitting, BP Cuff Size: Adult)   Pulse 79   Temp 36.6 °C (97.8 °F) (Temporal)   Ht 1.651 m (5' 5\")   Wt 114 kg (251 lb 0.2 oz)   SpO2 98%   BMI 41.77 kg/m²     Allergies   Allergen Reactions    Oxycodone-Acetaminophen Other and Unknown     :\"sensitivity\"    Sulfa (Sulfonamide Antibiotics) Other     Adverse reaction    Codeine Rash       Assessment/Plan   Problem List Items Addressed This Visit       Class 3 severe obesity due to excess calories with serious comorbidity and body mass index (BMI) of 40.0 to 44.9 in adult    Type 2 diabetes mellitus with hyperglycemia, without long-term current use of insulin    Relevant Orders    POCT glycosylated hemoglobin (Hb A1C) manually resulted (Completed)    Rheumatoid arthritis    BMI 40.0-44.9, adult (Multi)    Medicare annual wellness visit, subsequent - Primary    Diabetic polyneuropathy associated with type 2 diabetes mellitus (Multi)     Other Visit Diagnoses       Back pain, unspecified back location, unspecified back pain laterality, unspecified chronicity        Relevant Medications    traMADol (Ultram) 50 mg tablet    Left knee pain, unspecified chronicity        Relevant Medications    traMADol (Ultram) 50 mg tablet            Reviewed BW, unremarkable.  A1c performed today, 6.1%.    Refilled Tramadol.    Drink plenty of fluid and remain hydrated.    Follow up in 3 months or sooner if necessary.    If anything worsens or changes please call us at once, follow up in the office as planned,       Scribe Attestation  By signing my name below, INorma, Scribe   attest that this documentation has been prepared under the direction and in the presence of Tom Bingham DO.  "

## 2025-03-13 ENCOUNTER — PHARMACY VISIT (OUTPATIENT)
Dept: PHARMACY | Facility: CLINIC | Age: 70
End: 2025-03-13
Payer: COMMERCIAL

## 2025-03-18 PROCEDURE — RXMED WILLOW AMBULATORY MEDICATION CHARGE

## 2025-03-24 ENCOUNTER — PHARMACY VISIT (OUTPATIENT)
Dept: PHARMACY | Facility: CLINIC | Age: 70
End: 2025-03-24
Payer: COMMERCIAL

## 2025-03-24 DIAGNOSIS — N95.1 MENOPAUSAL STATE: ICD-10-CM

## 2025-03-24 PROCEDURE — RXMED WILLOW AMBULATORY MEDICATION CHARGE

## 2025-03-24 RX ORDER — ESTRADIOL 0.5 MG/1
0.5 TABLET ORAL DAILY
Qty: 90 TABLET | Refills: 0 | Status: SHIPPED | OUTPATIENT
Start: 2025-03-24

## 2025-03-28 ENCOUNTER — PHARMACY VISIT (OUTPATIENT)
Dept: PHARMACY | Facility: CLINIC | Age: 70
End: 2025-03-28
Payer: COMMERCIAL

## 2025-03-28 DIAGNOSIS — M06.9 RHEUMATOID ARTHRITIS, INVOLVING UNSPECIFIED SITE, UNSPECIFIED WHETHER RHEUMATOID FACTOR PRESENT (MULTI): ICD-10-CM

## 2025-03-31 ENCOUNTER — APPOINTMENT (OUTPATIENT)
Dept: PHARMACY | Facility: HOSPITAL | Age: 70
End: 2025-03-31
Payer: MEDICARE

## 2025-03-31 DIAGNOSIS — E11.65 TYPE 2 DIABETES MELLITUS WITH HYPERGLYCEMIA, WITHOUT LONG-TERM CURRENT USE OF INSULIN: ICD-10-CM

## 2025-03-31 RX ORDER — CELECOXIB 200 MG/1
200 CAPSULE ORAL
Qty: 180 CAPSULE | Refills: 1 | Status: SHIPPED | OUTPATIENT
Start: 2025-03-31

## 2025-03-31 NOTE — PROGRESS NOTES
Patient ID: Marianna Buck is a 69 y.o. female who presents for Diabetes   Pt is here for follow-up appointment.    Referring Provider: Tom Bingham DO   Last visit with PCP: 12.9.24    Verbal consent to manage patient's drug therapy was obtained from patient. They were informed they may decline to participate or withdraw from participation in pharmacy services at any time. Patient is agreeable to detailed voice messages if unable to be reached.       Subjective   Treatment Adherence:   Patient did take medications today.   Number of missed doses in last 7 days: 0.  Why? N/A     [facility]  Can patient afford prescribed medications: Yes,       Interval History:  Has been having some sensor issues  Skin cancer treatment, last surgery 10/2/24      HPI  DIABETES MELLITUS TYPE 2:    Known diabetic complications: none.  Optometry exam current (within 1year):  Yes, July 2024 per pt  Most recent visit in Podiatry was on - 6.24.24     Current diabetic medications include:  Ozempic 2mg weekly    DEXCOM G7   Sensor Capture   14 day capture rate = 63%   Average Blood Sugars  14 day average = 133   Time in target (14 days)  Very High >250 = 0%  High         181-250 = 5%  Target       = 94%  Low          54-69 = 0%  Very Low  <54 = 0%       Any episodes of hypoglycemia? No  Did patient treat episode of hypoglycemia appropriately? N/A    Does pt have proteinuria? No, NONE FOUND    If appropriate, is patient on ACEi/ARB? N/A    Secondary Prevention  Statin? No, Patient refuses statin .  ACE-I/ARB? N/A.  Aspirin? Yes, 81mg daily .    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     Labs  Lab  Results   Component Value Date    BILITOT 0.4 01/13/2025    CALCIUM 9.0 01/13/2025    CO2 32 01/13/2025    CL 99 01/13/2025    CREATININE 0.83 01/13/2025    GLUCOSE 105 (H) 01/13/2025    ALKPHOS 74 01/13/2025    K 4.8 01/13/2025    PROT 7.3 01/13/2025     01/13/2025    AST 26 01/13/2025    ALT 27 01/13/2025    BUN 12 01/13/2025    ANIONGAP 11 01/13/2025    MG 2.00 06/04/2020    ALBUMIN 4.0 01/13/2025    LIPASE 55 09/29/2018    GFRF 88 03/31/2023     Lab Results   Component Value Date    TRIG 264 (H) 01/31/2024    CHOL 157 01/31/2024    LDLCALC 64 01/31/2024    HDL 40.6 01/31/2024     Lab Results   Component Value Date    HGBA1C 6.1 03/10/2025       Current Outpatient Medications   Medication Instructions    aspirin 81 mg    blood-glucose sensor (Dexcom G7 Sensor) device Place one sensor every 10 days on back of arm to check sugars.    celecoxib (CELEBREX) 200 mg, oral, 2 times daily Nitrate    docusate sodium (COLACE) 100 mg, 2 times daily    DULoxetine (CYMBALTA) 60 mg, oral, Daily    estradiol (ESTRACE) 0.5 mg, oral, Daily    hydroxychloroquine (Plaquenil) 200 mg tablet Take 1 tablet (200 mg) by mouth in the morning and in the evening.    levothyroxine (SYNTHROID, LEVOXYL) 50 mcg, oral, Daily    metoprolol succinate XL (TOPROL-XL) 25 mg, oral, Daily    metroNIDAZOLE (Metrogel) 1 % gel 1 Application, Daily    omeprazole (PRILOSEC) 40 mg, oral, Daily before breakfast    Ozempic 2 mg, subcutaneous, Once Weekly    pregabalin (Lyrica) 150 mg capsule Take 1 capsule (150 mg) by mouth 3 times a day (in the morning, evening, and before bed).    traMADol (ULTRAM) 50 mg, oral, Every 6 hours PRN    triamterene-hydrochlorothiazid (Maxzide-25) 37.5-25 mg tablet 1 tablet, oral, Daily before breakfast         Assessment/Plan   Problem List Items Addressed This Visit             ICD-10-CM    Type 2 diabetes mellitus with hyperglycemia, without long-term current use of insulin E11.65     Is pt at goal? Yes, 6%   Patient's  SMBGs are at goal. No changes needed today.     Medication Changes:  CONTINUE  Ozempic 2mg weekly    Diabetes Education  Rule of 15: eating ~15 g of carbs when BG less than 80 (half cup juice, 3-4 glucose tabs).  Recognize symptoms of high and low blood sugar.   Eat a realistic healthy diet consisting of fruits, vegetables, fiber, protein food choices on a regular basis and be aware of portion/serving sizes. Reduce carbohydrate consumption and always consume with protein and fat. Avoid foods high in saturated/trans fat, high salt content, and sweets and beverages with added sugars.  Limit alcohol consumption; alcohol may affect your blood sugar and cause hypoglycemia.   Stay active and incorporate ~30 mins of exercise into your daily routine to manage your weight and increase the body's acceptance of insulin.  Ozempic Education:  Counseled patient on Ozempic MOA, expectations, side effects, duration of therapy, administration, and monitoring parameters.  Counseled patient on the benefits of GLP-1ra, such as cardiovascular risk reduction, glycemic control, and weight loss potential.  Provided detailed dosing and administration counseling to ensure proper technique.   Reviewed Ozempic titration schedule, starting with 0.25 mg once weekly to 0.5 mg, 1 mg, and if tolerated 2 mg.  Reviewed storage requirements of Ozempic when not in use, and when to administer the medication if a dose is missed.  Discussed risks of GLP1ra including risk of pancreatitis, MTC and worsening of DR  Advised patient that they may experience improved satiety after meals and portion sizes of meals may be reduced as doses of Ozempic increase.         Relevant Orders    Referral to Clinical Pharmacy       Immunizations needed: DTaP, Influenza, and COVD, RSV    Labs ordered:  none     Referrals:  none     Follow-up: 4 weeks     Patient was provided with PharmD phone number and encouraged to reach out with any questions or concerns Prior to next  appointment or ask provider for another pharmacy referral.    Time spent with pt: Total length of time 10 (minutes) of the encounter and more than 50% was spent counseling the patient.    Thank you for allowing to take part in the care of this patient.    Dorcas Mills PharmD, ESPINOZA  Clinical Pharmacist  [phone]  [email or web address]    Continue all meds under the continuation of care with the referring provider and clinical pharmacy team.    Verbal consent to manage patient's drug therapy was obtained from the patient. They were informed they may decline to participate or withdraw from participation in pharmacy services at any time.

## 2025-04-07 PROCEDURE — RXMED WILLOW AMBULATORY MEDICATION CHARGE

## 2025-04-08 ENCOUNTER — PHARMACY VISIT (OUTPATIENT)
Dept: PHARMACY | Facility: CLINIC | Age: 70
End: 2025-04-08
Payer: COMMERCIAL

## 2025-04-15 PROCEDURE — RXMED WILLOW AMBULATORY MEDICATION CHARGE

## 2025-04-17 ENCOUNTER — PHARMACY VISIT (OUTPATIENT)
Dept: PHARMACY | Facility: CLINIC | Age: 70
End: 2025-04-17
Payer: COMMERCIAL

## 2025-04-17 PROCEDURE — RXMED WILLOW AMBULATORY MEDICATION CHARGE

## 2025-04-18 ENCOUNTER — PHARMACY VISIT (OUTPATIENT)
Dept: PHARMACY | Facility: CLINIC | Age: 70
End: 2025-04-18
Payer: COMMERCIAL

## 2025-04-18 PROCEDURE — RXMED WILLOW AMBULATORY MEDICATION CHARGE

## 2025-04-18 RX ORDER — BROMPHENIRAMINE MALEATE, PSEUDOEPHEDRINE HYDROCHLORIDE, AND DEXTROMETHORPHAN HYDROBROMIDE 2; 30; 10 MG/5ML; MG/5ML; MG/5ML
10 SYRUP ORAL EVERY 4 HOURS PRN
Qty: 200 ML | Refills: 0 | OUTPATIENT
Start: 2025-04-18

## 2025-04-18 RX ORDER — AMOXICILLIN AND CLAVULANATE POTASSIUM 875; 125 MG/1; MG/1
1 TABLET, FILM COATED ORAL 2 TIMES DAILY
Qty: 20 TABLET | Refills: 0 | OUTPATIENT
Start: 2025-04-18

## 2025-04-24 DIAGNOSIS — E11.9 TYPE 2 DIABETES MELLITUS WITHOUT COMPLICATION, WITHOUT LONG-TERM CURRENT USE OF INSULIN: ICD-10-CM

## 2025-04-24 RX ORDER — BLOOD-GLUCOSE SENSOR
EACH MISCELLANEOUS
Qty: 9 EACH | Refills: 3 | Status: SHIPPED | OUTPATIENT
Start: 2025-04-24

## 2025-04-24 NOTE — PROGRESS NOTES
Patient reached out needing refills on testing supplies.   Will send refill to [facility].    Dorcas Mills, RaulD

## 2025-04-29 ENCOUNTER — APPOINTMENT (OUTPATIENT)
Dept: PHARMACY | Facility: HOSPITAL | Age: 70
End: 2025-04-29
Payer: MEDICARE

## 2025-04-29 DIAGNOSIS — E11.65 TYPE 2 DIABETES MELLITUS WITH HYPERGLYCEMIA, WITHOUT LONG-TERM CURRENT USE OF INSULIN: ICD-10-CM

## 2025-04-29 NOTE — PROGRESS NOTES
Patient ID: Marianna Buck is a 69 y.o. female who presents for Diabetes   Pt is here for follow-up appointment.    Referring Provider: Tom Bingham DO   Last visit with PCP: 3.10.25    Verbal consent to manage patient's drug therapy was obtained from patient. They were informed they may decline to participate or withdraw from participation in pharmacy services at any time. Patient is agreeable to detailed voice messages if unable to be reached.       Subjective   Treatment Adherence:   Patient did take medications today.   Number of missed doses in last 7 days: 0.  Why? N/A     [facility]  Can patient afford prescribed medications: Yes,       Interval History:  Doing well  Skin cancer treatment, last surgery 10/2/24      HPI  DIABETES MELLITUS TYPE 2:    Known diabetic complications: none.  Optometry exam current (within 1year):  Yes, July 2024 per pt  Most recent visit in Podiatry was on - 6.24.24     Current diabetic medications include:  Ozempic 2mg weekly    DEXCOM G7   Sensor Capture   14 day capture rate = 63%   Average Blood Sugars  14 day average = 133   Time in target (14 days)  Very High >250 = 0%  High         181-250 = 5%  Target       = 94%  Low          54-69 = 0%  Very Low  <54 = 0%       Any episodes of hypoglycemia? No  Did patient treat episode of hypoglycemia appropriately? N/A    Does pt have proteinuria? No, NONE FOUND    If appropriate, is patient on ACEi/ARB? N/A    Secondary Prevention  Statin? No, Patient refuses statin .  ACE-I/ARB? N/A.  Aspirin? Yes, 81mg daily .    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     Labs  Lab Results   Component Value  Date    BILITOT 0.4 01/13/2025    CALCIUM 9.0 01/13/2025    CO2 32 01/13/2025    CL 99 01/13/2025    CREATININE 0.83 01/13/2025    GLUCOSE 105 (H) 01/13/2025    ALKPHOS 74 01/13/2025    K 4.8 01/13/2025    PROT 7.3 01/13/2025     01/13/2025    AST 26 01/13/2025    ALT 27 01/13/2025    BUN 12 01/13/2025    ANIONGAP 11 01/13/2025    MG 2.00 06/04/2020    ALBUMIN 4.0 01/13/2025    LIPASE 55 09/29/2018    GFRF 88 03/31/2023     Lab Results   Component Value Date    TRIG 264 (H) 01/31/2024    CHOL 157 01/31/2024    LDLCALC 64 01/31/2024    HDL 40.6 01/31/2024     Lab Results   Component Value Date    HGBA1C 6.1 03/10/2025       Current Outpatient Medications   Medication Instructions    amoxicillin-clavulanate (Augmentin) 875-125 mg tablet Take 1 tablet by mouth twice daily for 10 days.    aspirin 81 mg    blood-glucose sensor (Dexcom G7 Sensor) device Place one sensor every 10 days on back of arm to check sugars.    brompheniramine-pseudoeph-DM 2-30-10 mg/5 mL syrup Take 10 mL by mouth every 4 to 6 hours if needed for cough or congestion.    celecoxib (CELEBREX) 200 mg, oral, 2 times daily Nitrate    docusate sodium (COLACE) 100 mg, 2 times daily    DULoxetine (CYMBALTA) 60 mg, oral, Daily    estradiol (ESTRACE) 0.5 mg, oral, Daily    fluorouracil (Efudex) 5 % cream cream Apply a thin layer to the affected areas on the face twice a day for two weeks (use first)    hydrocortisone 2.5 % cream Apply twice a day to face for 2 weeks after 5FU cream for healing    hydroxychloroquine (Plaquenil) 200 mg tablet Take 1 tablet (200 mg) by mouth in the morning and in the evening.    levothyroxine (SYNTHROID, LEVOXYL) 50 mcg, oral, Daily    metoprolol succinate XL (TOPROL-XL) 25 mg, oral, Daily    metroNIDAZOLE (Metrogel) 1 % gel 1 Application, Daily    omeprazole (PRILOSEC) 40 mg, oral, Daily before breakfast    Ozempic 2 mg, subcutaneous, Once Weekly    pregabalin (Lyrica) 150 mg capsule Take 1 capsule (150 mg) by mouth 3  times a day (in the morning, evening, and before bed).    traMADol (ULTRAM) 50 mg, oral, Every 6 hours PRN    triamterene-hydrochlorothiazid (Maxzide-25) 37.5-25 mg tablet 1 tablet, oral, Daily before breakfast         Assessment/Plan   Problem List Items Addressed This Visit           ICD-10-CM    Type 2 diabetes mellitus with hyperglycemia, without long-term current use of insulin E11.65    Is pt at goal? Yes, 6%   Patient's SMBGs are at goal. No changes needed today.     Medication Changes:  CONTINUE  Ozempic 2mg weekly    Diabetes Education  Rule of 15: eating ~15 g of carbs when BG less than 80 (half cup juice, 3-4 glucose tabs).  Recognize symptoms of high and low blood sugar.   Eat a realistic healthy diet consisting of fruits, vegetables, fiber, protein food choices on a regular basis and be aware of portion/serving sizes. Reduce carbohydrate consumption and always consume with protein and fat. Avoid foods high in saturated/trans fat, high salt content, and sweets and beverages with added sugars.  Limit alcohol consumption; alcohol may affect your blood sugar and cause hypoglycemia.   Stay active and incorporate ~30 mins of exercise into your daily routine to manage your weight and increase the body's acceptance of insulin.  Ozempic Education:  Counseled patient on Ozempic MOA, expectations, side effects, duration of therapy, administration, and monitoring parameters.  Counseled patient on the benefits of GLP-1ra, such as cardiovascular risk reduction, glycemic control, and weight loss potential.  Provided detailed dosing and administration counseling to ensure proper technique.   Reviewed Ozempic titration schedule, starting with 0.25 mg once weekly to 0.5 mg, 1 mg, and if tolerated 2 mg.  Reviewed storage requirements of Ozempic when not in use, and when to administer the medication if a dose is missed.  Discussed risks of GLP1ra including risk of pancreatitis, MTC and worsening of DR  Advised patient that  they may experience improved satiety after meals and portion sizes of meals may be reduced as doses of Ozempic increase.         Relevant Orders    Referral to Clinical Pharmacy       Immunizations needed: DTaP, Influenza, and COVD, RSV    Labs ordered:  none     Referrals:  none     Follow-up: 4 weeks     Patient was provided with PharmD phone number and encouraged to reach out with any questions or concerns Prior to next appointment or ask provider for another pharmacy referral.    Time spent with pt: Total length of time 10 (minutes) of the encounter and more than 50% was spent counseling the patient.    Thank you for allowing to take part in the care of this patient.    Dorcas Mills PharmD, ESPINOZA  Clinical Pharmacist  [phone]  [email or web address]    Continue all meds under the continuation of care with the referring provider and clinical pharmacy team.    Verbal consent to manage patient's drug therapy was obtained from the patient. They were informed they may decline to participate or withdraw from participation in pharmacy services at any time.

## 2025-04-30 ENCOUNTER — PHARMACY VISIT (OUTPATIENT)
Dept: PHARMACY | Facility: CLINIC | Age: 70
End: 2025-04-30
Payer: COMMERCIAL

## 2025-04-30 PROCEDURE — RXMED WILLOW AMBULATORY MEDICATION CHARGE

## 2025-05-21 ENCOUNTER — PHARMACY VISIT (OUTPATIENT)
Dept: PHARMACY | Facility: CLINIC | Age: 70
End: 2025-05-21
Payer: COMMERCIAL

## 2025-05-21 PROCEDURE — RXMED WILLOW AMBULATORY MEDICATION CHARGE

## 2025-05-27 ENCOUNTER — APPOINTMENT (OUTPATIENT)
Dept: PHARMACY | Facility: HOSPITAL | Age: 70
End: 2025-05-27
Payer: MEDICARE

## 2025-06-02 ENCOUNTER — APPOINTMENT (OUTPATIENT)
Dept: PHARMACY | Facility: HOSPITAL | Age: 70
End: 2025-06-02
Payer: MEDICARE

## 2025-06-02 DIAGNOSIS — E11.65 TYPE 2 DIABETES MELLITUS WITH HYPERGLYCEMIA, WITHOUT LONG-TERM CURRENT USE OF INSULIN: ICD-10-CM

## 2025-06-02 PROCEDURE — RXMED WILLOW AMBULATORY MEDICATION CHARGE

## 2025-06-02 NOTE — PROGRESS NOTES
Patient ID: Marianna Buck is a 69 y.o. female who presents for Diabetes   Pt is here for follow-up appointment.    Referring Provider: Tom Bingham DO   Last visit with PCP: 3.10.25    Verbal consent to manage patient's drug therapy was obtained from patient. They were informed they may decline to participate or withdraw from participation in pharmacy services at any time. Patient is agreeable to detailed voice messages if unable to be reached.       Subjective   Treatment Adherence:   Patient did take medications today.   Number of missed doses in last 7 days: 0.  Why? N/A     [facility]  Can patient afford prescribed medications: Yes,       Interval History:  Doing well  Skin cancer treatment, last surgery 10/2/24        HPI  DIABETES MELLITUS TYPE 2:    Known diabetic complications: none.  Optometry exam current (within 1year):  Yes, July 2024 per pt  Most recent visit in Podiatry was on - 6.24.24     Current diabetic medications include:  Ozempic 2mg weekly    DEXCOM G7   Sensor Capture   14 day capture rate = 67%   Average Blood Sugars  14 day average = 126   Time in target (14 days)  Very High >250 = 0%  High         181-250 = 3%  Target       = 96%  Low          54-69 = 1%  Very Low  <54 = 0%   GMI: N/A      Any episodes of hypoglycemia? No  Did patient treat episode of hypoglycemia appropriately? N/A    Does pt have proteinuria? No, NONE FOUND   If appropriate, is patient on ACEi/ARB? N/A    Secondary Prevention  Statin? No, Patient refuses statin.  ACE-I/ARB? N/A.  Aspirin? Yes, 81mg daily.    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     Labs  Lab Results    Component Value Date    BILITOT 0.4 01/13/2025    CALCIUM 9.0 01/13/2025    CO2 32 01/13/2025    CL 99 01/13/2025    CREATININE 0.83 01/13/2025    GLUCOSE 105 (H) 01/13/2025    ALKPHOS 74 01/13/2025    K 4.8 01/13/2025    PROT 7.3 01/13/2025     01/13/2025    AST 26 01/13/2025    ALT 27 01/13/2025    BUN 12 01/13/2025    ANIONGAP 11 01/13/2025    MG 2.00 06/04/2020    ALBUMIN 4.0 01/13/2025    LIPASE 55 09/29/2018    GFRF 88 03/31/2023     Lab Results   Component Value Date    TRIG 264 (H) 01/31/2024    CHOL 157 01/31/2024    LDLCALC 64 01/31/2024    HDL 40.6 01/31/2024     Lab Results   Component Value Date    HGBA1C 6.1 03/10/2025       Current Outpatient Medications   Medication Instructions    amoxicillin-clavulanate (Augmentin) 875-125 mg tablet Take 1 tablet by mouth twice daily for 10 days.    aspirin 81 mg    blood-glucose sensor (Dexcom G7 Sensor) device Place one sensor every 10 days on back of arm to check sugars.    brompheniramine-pseudoeph-DM 2-30-10 mg/5 mL syrup Take 10 mL by mouth every 4 to 6 hours if needed for cough or congestion.    celecoxib (CELEBREX) 200 mg, oral, 2 times daily Nitrate    docusate sodium (COLACE) 100 mg, 2 times daily    DULoxetine (CYMBALTA) 60 mg, oral, Daily    estradiol (ESTRACE) 0.5 mg, oral, Daily    fluorouracil (Efudex) 5 % cream cream Apply a thin layer to the affected areas on the face twice a day for two weeks (use first)    hydrocortisone 2.5 % cream Apply twice a day to face for 2 weeks after 5FU cream for healing    hydroxychloroquine (Plaquenil) 200 mg tablet Take 1 tablet (200 mg) by mouth in the morning and in the evening.    levothyroxine (SYNTHROID, LEVOXYL) 50 mcg, oral, Daily    metoprolol succinate XL (TOPROL-XL) 25 mg, oral, Daily    metroNIDAZOLE (Metrogel) 1 % gel 1 Application, Daily    omeprazole (PRILOSEC) 40 mg, oral, Daily before breakfast    Ozempic 2 mg, subcutaneous, Once Weekly    pregabalin (Lyrica) 150 mg capsule Take 1 capsule (150  mg) by mouth 3 times a day (in the morning, evening, and before bed).    traMADol (ULTRAM) 50 mg, oral, Every 6 hours PRN    triamterene-hydrochlorothiazid (Maxzide-25) 37.5-25 mg tablet 1 tablet, oral, Daily before breakfast         Assessment/Plan   Problem List Items Addressed This Visit           ICD-10-CM    Type 2 diabetes mellitus with hyperglycemia, without long-term current use of insulin E11.65    Is pt at goal? Yes, 6%   Patient's SMBGs are at goal with limited readings. Reminded to keep phone in range of sensor at all times. CGM was reordered through [facility] via fax.      Medication Changes:  CONTINUE  Ozempic 2mg weekly    Diabetes Education  Rule of 15: eating ~15 g of carbs when BG less than 80 (half cup juice, 3-4 glucose tabs).  Recognize symptoms of high and low blood sugar.   Eat a realistic healthy diet consisting of fruits, vegetables, fiber, protein food choices on a regular basis and be aware of portion/serving sizes. Reduce carbohydrate consumption and always consume with protein and fat. Avoid foods high in saturated/trans fat, high salt content, and sweets and beverages with added sugars.  Limit alcohol consumption; alcohol may affect your blood sugar and cause hypoglycemia.   Stay active and incorporate ~30 mins of exercise into your daily routine to manage your weight and increase the body's acceptance of insulin.  Ozempic Education:  Counseled patient on Ozempic MOA, expectations, side effects, duration of therapy, administration, and monitoring parameters.  Counseled patient on the benefits of GLP-1ra, such as cardiovascular risk reduction, glycemic control, and weight loss potential.  Provided detailed dosing and administration counseling to ensure proper technique.   Reviewed Ozempic titration schedule, starting with 0.25 mg once weekly to 0.5 mg, 1 mg, and if tolerated 2 mg.  Reviewed storage requirements of Ozempic when not in use, and when to administer the medication if a dose  is missed.  Discussed risks of GLP1ra including risk of pancreatitis, MTC and worsening of DR  Advised patient that they may experience improved satiety after meals and portion sizes of meals may be reduced as doses of Ozempic increase.         Relevant Orders    Referral to Clinical Pharmacy    Referral to Clinical Pharmacy     Health Maintenance Due   Topic Date Due    Bone Density Scan  Never done    Hepatitis C Screening  Never done    RSV High Risk: (Elderly (60+) or Pregnant Population) (1 - Risk 60-74 years 1-dose series) Never done    COVID-19 Vaccine (3 - Moderna risk series) 02/25/2021    DTaP/Tdap/Td Vaccines (2 - Td or Tdap) 07/17/2023    Lipid Panel  01/31/2025    Diabetes: Urine Protein Screening  01/31/2025    Mammogram  02/12/2025     Labs ordered:  none  Referrals:  none  Follow-up: 4 weeks     Patient was provided with PharmD phone number and encouraged to reach out with any questions or concerns Prior to next appointment or ask provider for another pharmacy referral.    Time spent with pt: Total length of time 10 (minutes) of the encounter and more than 50% was spent counseling the patient.    Thank you for allowing to take part in the care of this patient.    Dorcas Mills, PharmD, ESPINOZA  Clinical Pharmacist  [phone]  [email or web address]    Continue all meds under the continuation of care with the referring provider and clinical pharmacy team.    Verbal consent to manage patient's drug therapy was obtained from the patient. They were informed they may decline to participate or withdraw from participation in pharmacy services at any time.

## 2025-06-02 NOTE — ASSESSMENT & PLAN NOTE
Is pt at goal? Yes, 6%   Patient's SMBGs are at goal with limited readings. Reminded to keep phone in range of sensor at all times. CGM was reordered through [facility] via fax.      Medication Changes:  CONTINUE  Ozempic 2mg weekly    Diabetes Education  Rule of 15: eating ~15 g of carbs when BG less than 80 (half cup juice, 3-4 glucose tabs).  Recognize symptoms of high and low blood sugar.   Eat a realistic healthy diet consisting of fruits, vegetables, fiber, protein food choices on a regular basis and be aware of portion/serving sizes. Reduce carbohydrate consumption and always consume with protein and fat. Avoid foods high in saturated/trans fat, high salt content, and sweets and beverages with added sugars.  Limit alcohol consumption; alcohol may affect your blood sugar and cause hypoglycemia.   Stay active and incorporate ~30 mins of exercise into your daily routine to manage your weight and increase the body's acceptance of insulin.  Ozempic Education:  Counseled patient on Ozempic MOA, expectations, side effects, duration of therapy, administration, and monitoring parameters.  Counseled patient on the benefits of GLP-1ra, such as cardiovascular risk reduction, glycemic control, and weight loss potential.  Provided detailed dosing and administration counseling to ensure proper technique.   Reviewed Ozempic titration schedule, starting with 0.25 mg once weekly to 0.5 mg, 1 mg, and if tolerated 2 mg.  Reviewed storage requirements of Ozempic when not in use, and when to administer the medication if a dose is missed.  Discussed risks of GLP1ra including risk of pancreatitis, MTC and worsening of DR  Advised patient that they may experience improved satiety after meals and portion sizes of meals may be reduced as doses of Ozempic increase.

## 2025-06-03 ENCOUNTER — PHARMACY VISIT (OUTPATIENT)
Dept: PHARMACY | Facility: CLINIC | Age: 70
End: 2025-06-03
Payer: COMMERCIAL

## 2025-06-04 ENCOUNTER — CLINICAL SUPPORT (OUTPATIENT)
Dept: PRIMARY CARE | Facility: CLINIC | Age: 70
End: 2025-06-04
Payer: MEDICARE

## 2025-06-04 DIAGNOSIS — E11.65 TYPE 2 DIABETES MELLITUS WITH HYPERGLYCEMIA, WITHOUT LONG-TERM CURRENT USE OF INSULIN: ICD-10-CM

## 2025-06-04 NOTE — ASSESSMENT & PLAN NOTE
Marianna came in to troubleshoot Dexcom narendra today. She was able to connect without issue while in the clinic. She thinks she may need to work on home internet connection. Reviewed to also make sure she clears out old sensors from bluetooth connection as this can overload the narendra sometimes. While in clinic, I connected her to  Diabetes Clarity account for future appointments. I also provided her an additional Dexcom G7 sensor in case her authorization takes longer than expected for next year of coverage. No medication changes today. We have next visit set up 7/3 at 2:20PM.

## 2025-06-04 NOTE — PROGRESS NOTES
Patient ID: Marianna Buck is a 69 y.o. female who presents for Diabetes   Pt is here for follow-up appointment.    Referring Provider: Tom Bingham DO   Last visit with PCP: 3/10/25  Next visit: 6/9/2025    Verbal consent to manage patient's drug therapy was obtained from patient. They were informed they may decline to participate or withdraw from participation in pharmacy services at any time. Patient is agreeable to detailed voice messages if unable to be reached.       Subjective   Treatment Adherence:   Patient did take medications today.   Number of missed doses in last 7 days: 0.  Why? N/A     [facility]  Can patient afford prescribed medications: Yes,       Interval History:  Patient presents today to trouble-shoot Dexcom G7 phone narendra  Able to log in to narendra and connect without issue in clinic  Potentially worried about internet connection at home    DIABETES MELLITUS TYPE 2:    Known diabetic complications: none.  Optometry exam current (within 1 year)  Most recent visit in Podiatry was on - 6/24/24     Current diabetic medications include:  Ozempic 2mg weekly    DEXCOM G7   - was able to get this fixed  90 day GMI was 6.4% upon review  Connected to  diabetes while in office and provided Dexcom G7 sensor sample    Any episodes of hypoglycemia? A few potential lows when not connected to sensors- happens in afternoon  Did patient treat episode of hypoglycemia appropriately? N/A    Does pt have proteinuria? No, NONE FOUND   If appropriate, is patient on ACEi/ARB? N/A    Secondary Prevention  Statin? No, Patient refuses statin.  ACE-I/ARB? N/A.  Aspirin? Yes, 81mg daily.    Pertinent PMH Review:  PMH of Pancreatitis: No  PMH of Retinopathy: No  PMH of Urinary Tract Infections: Yes  PMH of MTC: No    Lifestyle:  Current exercise: Housework, going out, up and down the stairs more, babysitting kids 1-2 days/week  Current diet: no changes    Objective     There were no vitals  taken for this visit.     Labs  Lab Results   Component Value Date    BILITOT 0.4 01/13/2025    CALCIUM 9.0 01/13/2025    CO2 32 01/13/2025    CL 99 01/13/2025    CREATININE 0.83 01/13/2025    GLUCOSE 105 (H) 01/13/2025    ALKPHOS 74 01/13/2025    K 4.8 01/13/2025    PROT 7.3 01/13/2025     01/13/2025    AST 26 01/13/2025    ALT 27 01/13/2025    BUN 12 01/13/2025    ANIONGAP 11 01/13/2025    MG 2.00 06/04/2020    ALBUMIN 4.0 01/13/2025    LIPASE 55 09/29/2018    GFRF 88 03/31/2023     Lab Results   Component Value Date    TRIG 264 (H) 01/31/2024    CHOL 157 01/31/2024    LDLCALC 64 01/31/2024    HDL 40.6 01/31/2024     Lab Results   Component Value Date    HGBA1C 6.1 03/10/2025       Current Outpatient Medications   Medication Instructions    amoxicillin-clavulanate (Augmentin) 875-125 mg tablet Take 1 tablet by mouth twice daily for 10 days.    aspirin 81 mg    blood-glucose sensor (Dexcom G7 Sensor) device Place one sensor every 10 days on back of arm to check sugars.    brompheniramine-pseudoeph-DM 2-30-10 mg/5 mL syrup Take 10 mL by mouth every 4 to 6 hours if needed for cough or congestion.    celecoxib (CELEBREX) 200 mg, oral, 2 times daily Nitrate    docusate sodium (COLACE) 100 mg, 2 times daily    DULoxetine (CYMBALTA) 60 mg, oral, Daily    estradiol (ESTRACE) 0.5 mg, oral, Daily    fluorouracil (Efudex) 5 % cream cream Apply a thin layer to the affected areas on the face twice a day for two weeks (use first)    hydrocortisone 2.5 % cream Apply twice a day to face for 2 weeks after 5FU cream for healing    hydroxychloroquine (Plaquenil) 200 mg tablet Take 1 tablet (200 mg) by mouth in the morning and in the evening.    levothyroxine (SYNTHROID, LEVOXYL) 50 mcg, oral, Daily    metoprolol succinate XL (TOPROL-XL) 25 mg, oral, Daily    metroNIDAZOLE (Metrogel) 1 % gel 1 Application, Daily    omeprazole (PRILOSEC) 40 mg, oral, Daily before breakfast    Ozempic 2 mg, subcutaneous, Once Weekly    pregabalin  (Lyrica) 150 mg capsule Take 1 capsule (150 mg) by mouth 3 times a day (in the morning, evening, and before bed).    traMADol (ULTRAM) 50 mg, oral, Every 6 hours PRN    triamterene-hydrochlorothiazid (Maxzide-25) 37.5-25 mg tablet 1 tablet, oral, Daily before breakfast     Assessment/Plan   Problem List Items Addressed This Visit           ICD-10-CM    Type 2 diabetes mellitus with hyperglycemia, without long-term current use of insulin E11.65    Marianna came in to troubleshoot Dexcom narendra today. She was able to connect without issue while in the clinic. She thinks she may need to work on home internet connection. Reviewed to also make sure she clears out old sensors from bluetooth connection as this can overload the narendra sometimes. While in clinic, I connected her to  Diabetes Clarity account for future appointments. I also provided her an additional Dexcom G7 sensor in case her authorization takes longer than expected for next year of coverage. No medication changes today. We have next visit set up 7/3 at 2:20PM.          Immunizations needed: DTaP, Influenza, and COVD, RSV    Labs ordered:  none     Referrals:  none     Follow-up: scheduled 7/3     Patient was provided with PharmD phone number and encouraged to reach out with any questions or concerns Prior to next appointment or ask provider for another pharmacy referral.    Thank you for allowing to take part in the care of this patient.    Darline Nguyễn, PharmD  Clinical Pharmacist  [phone]    Continue all meds under the continuation of care with the referring provider and clinical pharmacy team.    Verbal consent to manage patient's drug therapy was obtained from the patient. They were informed they may decline to participate or withdraw from participation in pharmacy services at any time.

## 2025-06-09 ENCOUNTER — APPOINTMENT (OUTPATIENT)
Dept: PRIMARY CARE | Facility: CLINIC | Age: 70
End: 2025-06-09
Payer: MEDICARE

## 2025-06-09 VITALS
SYSTOLIC BLOOD PRESSURE: 120 MMHG | DIASTOLIC BLOOD PRESSURE: 80 MMHG | OXYGEN SATURATION: 99 % | TEMPERATURE: 97.8 F | HEART RATE: 71 BPM

## 2025-06-09 DIAGNOSIS — M25.562 LEFT KNEE PAIN, UNSPECIFIED CHRONICITY: ICD-10-CM

## 2025-06-09 DIAGNOSIS — M06.9 RHEUMATOID ARTHRITIS, INVOLVING UNSPECIFIED SITE, UNSPECIFIED WHETHER RHEUMATOID FACTOR PRESENT (MULTI): ICD-10-CM

## 2025-06-09 DIAGNOSIS — Z51.81 THERAPEUTIC DRUG MONITORING: ICD-10-CM

## 2025-06-09 DIAGNOSIS — M79.7 FIBROMYALGIA: ICD-10-CM

## 2025-06-09 DIAGNOSIS — K21.9 GASTROESOPHAGEAL REFLUX DISEASE, UNSPECIFIED WHETHER ESOPHAGITIS PRESENT: ICD-10-CM

## 2025-06-09 DIAGNOSIS — M54.9 BACK PAIN, UNSPECIFIED BACK LOCATION, UNSPECIFIED BACK PAIN LATERALITY, UNSPECIFIED CHRONICITY: ICD-10-CM

## 2025-06-09 PROCEDURE — 1160F RVW MEDS BY RX/DR IN RCRD: CPT | Performed by: FAMILY MEDICINE

## 2025-06-09 PROCEDURE — 3044F HG A1C LEVEL LT 7.0%: CPT | Performed by: FAMILY MEDICINE

## 2025-06-09 PROCEDURE — 1159F MED LIST DOCD IN RCRD: CPT | Performed by: FAMILY MEDICINE

## 2025-06-09 PROCEDURE — 3074F SYST BP LT 130 MM HG: CPT | Performed by: FAMILY MEDICINE

## 2025-06-09 PROCEDURE — 99214 OFFICE O/P EST MOD 30 MIN: CPT | Performed by: FAMILY MEDICINE

## 2025-06-09 PROCEDURE — 1036F TOBACCO NON-USER: CPT | Performed by: FAMILY MEDICINE

## 2025-06-09 PROCEDURE — 3079F DIAST BP 80-89 MM HG: CPT | Performed by: FAMILY MEDICINE

## 2025-06-09 RX ORDER — TRAMADOL HYDROCHLORIDE 50 MG/1
50 TABLET, FILM COATED ORAL EVERY 6 HOURS PRN
Qty: 120 TABLET | Refills: 2 | Status: SHIPPED | OUTPATIENT
Start: 2025-06-09

## 2025-06-09 RX ORDER — HYDROXYCHLOROQUINE SULFATE 200 MG/1
200 TABLET, FILM COATED ORAL
Qty: 180 TABLET | Refills: 1 | Status: SHIPPED | OUTPATIENT
Start: 2025-06-09

## 2025-06-09 RX ORDER — OMEPRAZOLE 40 MG/1
40 CAPSULE, DELAYED RELEASE ORAL
Start: 2025-06-09

## 2025-06-09 RX ORDER — PREGABALIN 150 MG/1
150 CAPSULE ORAL 3 TIMES DAILY
Qty: 270 CAPSULE | Refills: 1 | Status: SHIPPED | OUTPATIENT
Start: 2025-06-09

## 2025-06-09 NOTE — PROGRESS NOTES
Subjective   Patient ID: Marianna Buck is a 69 y.o. female who presents for GERD, Arthritis, and Gas.  HPI    Patient presents today for a follow-up on Fibromyalgia. Is taking Tramadol/Lyrica. States she has no concerns with this medication. Rates the pain a 9/10 over the past 7 days. Reports that the medication gives 90% pain control/relief. [place] today. Controlled substance contract signed 06/09/25. Last took it this morning.    Patient complaints of GERD. Patient is currently taking omeprazole. Patient states she has been having a lot of gas and acid reflex. Patient would like a referral to gastroenterology.      Review of systems  ; Patient seen today for exam denies any problems with headaches or vision, denies any shortness of breath chest pain nausea or vomiting, no black stool no blood in the stool no heartburn type symptoms denies any problems with constipation or diarrhea, and no dysuria-type symptoms    The patient's allergies medications were reviewed with them today    The patient's social family and surgical history or also reviewed here today, along with her past medical history.     Objective     Alert and active in  no acute distress  HEENT TMs clear oropharynx negative nares clear no drainage noted neck supple  With no adenopathy   Heart regular rate and rhythm without murmur and no carotid bruits  Lungs- clear to auscultation bilaterally, no wheeze or rhonchi noted  Thyroid -negative masses or nodularity  Abdomen- soft times four quadrants , bowel sounds positive no masses or organomegaly, negative tenderness guarding or rebound    skin -no lesions noted      /80 (BP Location: Right arm, Patient Position: Sitting, BP Cuff Size: Adult long)   Pulse 71   Temp 36.6 °C (97.8 °F) (Temporal)   SpO2 99%         Assessment/Plan   Problem List Items Addressed This Visit       Fibromyalgia    Relevant Medications    pregabalin (Lyrica) 150 mg capsule    GERD (gastroesophageal reflux  disease)    Relevant Medications    omeprazole (PriLOSEC) 40 mg DR capsule    Rheumatoid arthritis    Relevant Medications    hydroxychloroquine (Plaquenil) 200 mg tablet     Other Visit Diagnoses         Therapeutic drug monitoring        Relevant Orders    Opiate/Opioid/Benzo Prescription Compliance      Back pain, unspecified back location, unspecified back pain laterality, unspecified chronicity        Relevant Medications    traMADol (Ultram) 50 mg tablet      Left knee pain, unspecified chronicity        Relevant Medications    traMADol (Ultram) 50 mg tablet            Patient is overall doing well with the current medication regimen and will continue with the medications at the current dose, except for an increase in GERD medication due to having increased gas and reflux.    Increased gabapentin to 40 mg twice daily. New prescription sent today.    Refilled Plaquenil, omeprazole, pregabalin, tramadol.   CSA 6/9/25.    If anything worsens or changes please call us at once, follow up in the office as planned,       Scribe Attestation  By signing my name below, INorma Scribe   attest that this documentation has been prepared under the direction and in the presence of Tom Bingham DO.    All medical record entries made by the Scribe were at my direction and personally dictated by me.   I have reviewed the chart and agree that the record accurately reflects my personal performance of the history, physical exam, discussion, and plan.

## 2025-06-11 DIAGNOSIS — E11.65 TYPE 2 DIABETES MELLITUS WITH HYPERGLYCEMIA, WITHOUT LONG-TERM CURRENT USE OF INSULIN: ICD-10-CM

## 2025-06-11 PROCEDURE — RXMED WILLOW AMBULATORY MEDICATION CHARGE

## 2025-06-11 RX ORDER — SEMAGLUTIDE 2.68 MG/ML
2 INJECTION, SOLUTION SUBCUTANEOUS
Qty: 3 ML | Refills: 11 | Status: SHIPPED | OUTPATIENT
Start: 2025-06-15

## 2025-06-13 LAB
1OH-MIDAZOLAM UR-MCNC: NEGATIVE NG/ML
7AMINOCLONAZEPAM UR-MCNC: NEGATIVE NG/ML
A-OH ALPRAZ UR-MCNC: NEGATIVE NG/ML
A-OH-TRIAZOLAM UR-MCNC: NEGATIVE NG/ML
AMPHETAMINES UR QL: NEGATIVE NG/ML
BARBITURATES UR QL: NEGATIVE NG/ML
BZE UR QL: NEGATIVE NG/ML
CODEINE UR-MCNC: NEGATIVE NG/ML
CREAT UR-MCNC: 203.9 MG/DL
DRUG SCREEN COMMENT UR-IMP: ABNORMAL
EDDP UR-MCNC: NEGATIVE NG/ML
FENTANYL UR-MCNC: NEGATIVE NG/ML
HYDROCODONE UR-MCNC: NEGATIVE NG/ML
HYDROMORPHONE UR-MCNC: NEGATIVE NG/ML
LORAZEPAM UR-MCNC: NEGATIVE NG/ML
METHADONE UR-MCNC: NEGATIVE NG/ML
MORPHINE UR-MCNC: NEGATIVE NG/ML
NORDIAZEPAM UR-MCNC: NEGATIVE NG/ML
NORFENTANYL UR-MCNC: NEGATIVE NG/ML
NORHYDROCODONE UR CFM-MCNC: NEGATIVE NG/ML
NOROXYCODONE UR CFM-MCNC: NEGATIVE NG/ML
NORTRAMADOL UR-MCNC: 7709 NG/ML
OH-ETHYLFLURAZ UR-MCNC: NEGATIVE NG/ML
OXAZEPAM UR-MCNC: NEGATIVE NG/ML
OXIDANTS UR QL: NEGATIVE MCG/ML
OXYCODONE UR CFM-MCNC: NEGATIVE NG/ML
OXYMORPHONE UR CFM-MCNC: NEGATIVE NG/ML
PCP UR QL: NEGATIVE NG/ML
PH UR: 7 [PH] (ref 4.5–9)
QUEST 6 ACETYLMORPHINE: ABNORMAL
QUEST NOTES AND COMMENTS: ABNORMAL
QUEST PATIENT HISTORICAL REPORT: ABNORMAL
QUEST ZOLPIDEM: NEGATIVE NG/ML
TEMAZEPAM UR-MCNC: NEGATIVE NG/ML
THC UR QL: NEGATIVE NG/ML
TRAMADOL UR-MCNC: ABNORMAL NG/ML
ZOLPIDEM PHENYL-4-CARB UR CFM-MCNC: NEGATIVE NG/ML

## 2025-06-17 LAB
1OH-MIDAZOLAM UR-MCNC: NEGATIVE NG/ML
7AMINOCLONAZEPAM UR-MCNC: NEGATIVE NG/ML
A-OH ALPRAZ UR-MCNC: NEGATIVE NG/ML
A-OH-TRIAZOLAM UR-MCNC: NEGATIVE NG/ML
AMPHETAMINES UR QL: NEGATIVE NG/ML
BARBITURATES UR QL: NEGATIVE NG/ML
BZE UR QL: NEGATIVE NG/ML
CODEINE UR-MCNC: NEGATIVE NG/ML
CREAT UR-MCNC: 203.9 MG/DL
DRUG SCREEN COMMENT UR-IMP: ABNORMAL
EDDP UR-MCNC: NEGATIVE NG/ML
FENTANYL UR-MCNC: NEGATIVE NG/ML
HYDROCODONE UR-MCNC: NEGATIVE NG/ML
HYDROMORPHONE UR-MCNC: NEGATIVE NG/ML
LORAZEPAM UR-MCNC: NEGATIVE NG/ML
METHADONE UR-MCNC: NEGATIVE NG/ML
MORPHINE UR-MCNC: NEGATIVE NG/ML
NORDIAZEPAM UR-MCNC: NEGATIVE NG/ML
NORFENTANYL UR-MCNC: NEGATIVE NG/ML
NORHYDROCODONE UR CFM-MCNC: NEGATIVE NG/ML
NOROXYCODONE UR CFM-MCNC: NEGATIVE NG/ML
NORTRAMADOL UR-MCNC: 7709 NG/ML
OH-ETHYLFLURAZ UR-MCNC: NEGATIVE NG/ML
OXAZEPAM UR-MCNC: NEGATIVE NG/ML
OXIDANTS UR QL: NEGATIVE MCG/ML
OXYCODONE UR CFM-MCNC: NEGATIVE NG/ML
OXYMORPHONE UR CFM-MCNC: NEGATIVE NG/ML
PCP UR QL: NEGATIVE NG/ML
PH UR: 7 [PH] (ref 4.5–9)
QUEST 6 ACETYLMORPHINE: ABNORMAL NG/ML
QUEST NOTES AND COMMENTS: ABNORMAL
QUEST ZOLPIDEM: NEGATIVE NG/ML
TEMAZEPAM UR-MCNC: NEGATIVE NG/ML
THC UR QL: NEGATIVE NG/ML
TRAMADOL UR-MCNC: ABNORMAL NG/ML
ZOLPIDEM PHENYL-4-CARB UR CFM-MCNC: NEGATIVE NG/ML

## 2025-06-18 ENCOUNTER — APPOINTMENT (OUTPATIENT)
Dept: CARDIOLOGY | Facility: CLINIC | Age: 70
End: 2025-06-18
Payer: MEDICARE

## 2025-06-20 PROCEDURE — RXMED WILLOW AMBULATORY MEDICATION CHARGE

## 2025-06-23 ENCOUNTER — PHARMACY VISIT (OUTPATIENT)
Dept: PHARMACY | Facility: CLINIC | Age: 70
End: 2025-06-23
Payer: COMMERCIAL

## 2025-06-23 PROCEDURE — RXMED WILLOW AMBULATORY MEDICATION CHARGE

## 2025-07-03 ENCOUNTER — APPOINTMENT (OUTPATIENT)
Dept: PHARMACY | Facility: HOSPITAL | Age: 70
End: 2025-07-03
Payer: MEDICARE

## 2025-07-03 ENCOUNTER — PHARMACY VISIT (OUTPATIENT)
Dept: PHARMACY | Facility: CLINIC | Age: 70
End: 2025-07-03
Payer: COMMERCIAL

## 2025-07-03 PROCEDURE — RXMED WILLOW AMBULATORY MEDICATION CHARGE

## 2025-07-07 ENCOUNTER — PHARMACY VISIT (OUTPATIENT)
Dept: PHARMACY | Facility: CLINIC | Age: 70
End: 2025-07-07
Payer: COMMERCIAL

## 2025-07-07 PROCEDURE — RXMED WILLOW AMBULATORY MEDICATION CHARGE

## 2025-07-10 ENCOUNTER — TELEMEDICINE (OUTPATIENT)
Dept: PHARMACY | Facility: HOSPITAL | Age: 70
End: 2025-07-10
Payer: MEDICARE

## 2025-07-10 DIAGNOSIS — E11.65 TYPE 2 DIABETES MELLITUS WITH HYPERGLYCEMIA, WITHOUT LONG-TERM CURRENT USE OF INSULIN: ICD-10-CM

## 2025-07-10 PROCEDURE — RXMED WILLOW AMBULATORY MEDICATION CHARGE

## 2025-07-10 RX ORDER — DEXTROSE 4 G
TABLET,CHEWABLE ORAL
Qty: 1 EACH | Refills: 0 | Status: SHIPPED | OUTPATIENT
Start: 2025-07-10

## 2025-07-10 RX ORDER — TIRZEPATIDE 7.5 MG/.5ML
7.5 INJECTION, SOLUTION SUBCUTANEOUS WEEKLY
Qty: 2 ML | Refills: 1 | Status: SHIPPED | OUTPATIENT
Start: 2025-07-10

## 2025-07-10 NOTE — PROGRESS NOTES
Patient ID: Marianna Buck is a 69 y.o. female who presents for Diabetes   Pt is here for follow-up appointment.    Referring Provider: Tom Bingham DO   Last visit with PCP: 6/9/25  Next visit: not yet scheduled    Verbal consent to manage patient's drug therapy was obtained from patient. They were informed they may decline to participate or withdraw from participation in pharmacy services at any time. Patient is agreeable to detailed voice messages if unable to be reached.       Subjective   Treatment Adherence:   Patient did take medications today.   Number of missed doses in last 7 days: 0.  Why? N/A     [facility]  Can patient afford prescribed medications: Yes,       Interval History:  Has new Dexcom supplies, including reader which she is more reliably connecting with than previous Dexcom phone narendra  Nausea and vomiting after most recent Ozempic 2 mg dose  Interested in trying Mounjaro instead    DIABETES MELLITUS TYPE 2:    Known diabetic complications: none.  Optometry exam current (within 1 year)  Most recent visit in Podiatry was on - 6/24/24     Current diabetic medications include:  Ozempic 2mg weekly    DEXCOM G7   - GMI is 6.8% - higher than previous low 6s    Any episodes of hypoglycemia? A few potential lows when not connected to sensors- happens in afternoon  Did patient treat episode of hypoglycemia appropriately? N/A    Does pt have proteinuria? No, NONE FOUND   If appropriate, is patient on ACEi/ARB? N/A    Secondary Prevention  Statin? No, Patient refuses statin.  ACE-I/ARB? N/A.  Aspirin? Yes, 81mg daily.    Pertinent PMH Review:  PMH of Pancreatitis: No  PMH of Retinopathy: No  PMH of Urinary Tract Infections: Yes  PMH of MTC: No    Lifestyle:  Current exercise: Housework, going out, up and down the stairs more, babysitting kids 1-2 days/week  Current diet: no changes    Objective     There were no vitals taken for this visit.     Labs  Lab Results   Component  Value Date    BILITOT 0.4 01/13/2025    CALCIUM 9.0 01/13/2025    CO2 32 01/13/2025    CL 99 01/13/2025    CREATININE 0.83 01/13/2025    GLUCOSE 105 (H) 01/13/2025    ALKPHOS 74 01/13/2025    K 4.8 01/13/2025    PROT 7.3 01/13/2025     01/13/2025    AST 26 01/13/2025    ALT 27 01/13/2025    BUN 12 01/13/2025    ANIONGAP 11 01/13/2025    MG 2.00 06/04/2020    ALBUMIN 4.0 01/13/2025    LIPASE 55 09/29/2018    GFRF 88 03/31/2023     Lab Results   Component Value Date    TRIG 264 (H) 01/31/2024    CHOL 157 01/31/2024    LDLCALC 64 01/31/2024    HDL 40.6 01/31/2024     Lab Results   Component Value Date    HGBA1C 6.1 03/10/2025       Current Outpatient Medications   Medication Instructions    aspirin 81 mg    blood-glucose meter (Accu-Chek Guide Glucose Meter) misc Use daily as needed to test blood glucose against continuous monitor.    blood-glucose sensor (Dexcom G7 Sensor) device Place one sensor every 10 days on back of arm to check sugars.    brompheniramine-pseudoeph-DM 2-30-10 mg/5 mL syrup Take 10 mL by mouth every 4 to 6 hours if needed for cough or congestion.    celecoxib (CELEBREX) 200 mg, oral, 2 times daily Nitrate    docusate sodium (COLACE) 100 mg, 2 times daily    DULoxetine (CYMBALTA) 60 mg, oral, Daily    estradiol (ESTRACE) 0.5 mg, oral, Daily    fluorouracil (Efudex) 5 % cream cream Apply a thin layer to the affected areas on the face twice a day for two weeks (use first)    hydrocortisone 2.5 % cream Apply twice a day to face for 2 weeks after 5FU cream for healing    hydroxychloroquine (Plaquenil) 200 mg tablet Take 1 tablet (200 mg) by mouth in the morning and in the evening.    levothyroxine (SYNTHROID, LEVOXYL) 50 mcg, oral, Daily    metoprolol succinate XL (TOPROL-XL) 25 mg, oral, Daily    metroNIDAZOLE (Metrogel) 1 % gel 1 Application, Daily    omeprazole (PRILOSEC) 40 mg, oral, 2 times daily before meals    Ozempic 2 mg, subcutaneous, Once Weekly    pregabalin (Lyrica) 150 mg capsule  Take 1 capsule (150 mg) by mouth 3 times a day (in the morning, evening, and before bed).    tirzepatide (Mounjaro) 7.5 mg/0.5 mL pen injector Inject 1 pen (7.5 mg) under the skin 1 (one) time per week.    traMADol (ULTRAM) 50 mg, oral, Every 6 hours PRN    triamterene-hydrochlorothiazid (Maxzide-25) 37.5-25 mg tablet 1 tablet, oral, Daily before breakfast     Assessment/Plan   Problem List Items Addressed This Visit           ICD-10-CM    Type 2 diabetes mellitus with hyperglycemia, without long-term current use of insulin E11.65    Patient's goal A1c is < 7%.  Is pt at goal? Yes, 6.1%  Patient's SMBGs are at goal but higher than her previous readings.     Rationale for plan: Marianna experienced some vomiting after increased Ozempic to 2 mg weekly. She is also seeing weight gain that she wants to try to handle with change in medication. Will try to switch to Mounjaro 7.5 mg weekly to see if she tolerates Mounjaro better than Ozempic and has higher effect on appetite and weight. She also requests new glucometer to verify her and her husbands readings. Her strips are [employer] guide, so this meter was ordered at pharmacy for her.    Medication Changes:  STOP  Ozempic 2 mg  START  Mounjaro 7.5 mg weekly         Relevant Medications    tirzepatide (Mounjaro) 7.5 mg/0.5 mL pen injector    blood-glucose meter (Accu-Chek Guide Glucose Meter) misc    Other Relevant Orders    Referral to Clinical Pharmacy     Immunizations needed: DTaP, Influenza, and COVD, RSV    Labs ordered:  none     Referrals:  none     Follow-up: 7/31 11:20AM     Patient was provided with PharmD phone number and encouraged to reach out with any questions or concerns Prior to next appointment or ask provider for another pharmacy referral.    Thank you for allowing to take part in the care of this patient.    Darline Nguyễn, PharmD  Clinical Pharmacist  [phone]    Continue all meds under the continuation of care with the referring provider and  clinical pharmacy team.    Verbal consent to manage patient's drug therapy was obtained from the patient. They were informed they may decline to participate or withdraw from participation in pharmacy services at any time.

## 2025-07-10 NOTE — ASSESSMENT & PLAN NOTE
Patient's goal A1c is < 7%.  Is pt at goal? Yes, 6.1%  Patient's SMBGs are at goal but higher than her previous readings.     Rationale for plan: Marianna experienced some vomiting after increased Ozempic to 2 mg weekly. She is also seeing weight gain that she wants to try to handle with change in medication. Will try to switch to Mounjaro 7.5 mg weekly to see if she tolerates Mounjaro better than Ozempic and has higher effect on appetite and weight. She also requests new glucometer to verify her and her husbands readings. Her strips are Accuchek guide, so this meter was ordered at pharmacy for her.    Medication Changes:  STOP  Ozempic 2 mg  START  Mounjaro 7.5 mg weekly

## 2025-07-12 ENCOUNTER — PHARMACY VISIT (OUTPATIENT)
Dept: PHARMACY | Facility: CLINIC | Age: 70
End: 2025-07-12
Payer: COMMERCIAL

## 2025-07-17 ENCOUNTER — APPOINTMENT (OUTPATIENT)
Dept: PHARMACY | Facility: HOSPITAL | Age: 70
End: 2025-07-17
Payer: MEDICARE

## 2025-07-24 DIAGNOSIS — K21.9 GASTROESOPHAGEAL REFLUX DISEASE, UNSPECIFIED WHETHER ESOPHAGITIS PRESENT: ICD-10-CM

## 2025-07-24 DIAGNOSIS — N95.1 MENOPAUSAL STATE: ICD-10-CM

## 2025-07-24 PROCEDURE — RXMED WILLOW AMBULATORY MEDICATION CHARGE

## 2025-07-25 RX ORDER — ESTRADIOL 0.5 MG/1
0.5 TABLET ORAL DAILY
Qty: 90 TABLET | Refills: 0 | Status: SHIPPED | OUTPATIENT
Start: 2025-07-25

## 2025-07-25 RX ORDER — OMEPRAZOLE 40 MG/1
40 CAPSULE, DELAYED RELEASE ORAL
Qty: 90 CAPSULE | Refills: 1 | Status: SHIPPED | OUTPATIENT
Start: 2025-07-25

## 2025-07-26 PROCEDURE — RXMED WILLOW AMBULATORY MEDICATION CHARGE

## 2025-07-29 ENCOUNTER — PHARMACY VISIT (OUTPATIENT)
Dept: PHARMACY | Facility: CLINIC | Age: 70
End: 2025-07-29
Payer: COMMERCIAL

## 2025-07-31 ENCOUNTER — TELEPHONE (OUTPATIENT)
Dept: PRIMARY CARE | Facility: CLINIC | Age: 70
End: 2025-07-31

## 2025-07-31 ENCOUNTER — APPOINTMENT (OUTPATIENT)
Dept: PHARMACY | Facility: HOSPITAL | Age: 70
End: 2025-07-31
Payer: MEDICARE

## 2025-07-31 DIAGNOSIS — K21.9 GASTROESOPHAGEAL REFLUX DISEASE, UNSPECIFIED WHETHER ESOPHAGITIS PRESENT: ICD-10-CM

## 2025-07-31 RX ORDER — OMEPRAZOLE 40 MG/1
40 CAPSULE, DELAYED RELEASE ORAL
Qty: 90 CAPSULE | Refills: 1 | Status: SHIPPED | OUTPATIENT
Start: 2025-07-31

## 2025-07-31 NOTE — TELEPHONE ENCOUNTER
Rx Refill Request Telephone Encounter    Name:  Marianna Buck  :  [id number]  Rx #: [id number]  omeprazole (PriLOSEC) 40 mg DR capsule [[id number]]    Order Details    Dose: 40 mg Route: oral Frequency: Daily before breakfast   Dispense Quantity: 90 capsule (90 day supply) Refills: 1 Fills remainin   Duration: -- Dispense As Written: No          Sig: Take 1 capsule (40 mg) by mouth once daily in the morning. Take before meals.           Specific Pharmacy location:    [facility]  [address] [place] [address]  Phone: [phone]  Fax: [phone]  LIZBETH #: [id number]     Date of last appointment:  25  Date of next appointment:    Best number to reach patient:  [phone]

## 2025-08-04 DIAGNOSIS — I10 HYPERTENSION, UNSPECIFIED TYPE: ICD-10-CM

## 2025-08-04 PROCEDURE — RXMED WILLOW AMBULATORY MEDICATION CHARGE

## 2025-08-04 RX ORDER — METOPROLOL SUCCINATE 25 MG/1
25 TABLET, EXTENDED RELEASE ORAL DAILY
Qty: 90 TABLET | Refills: 1 | Status: SHIPPED | OUTPATIENT
Start: 2025-08-04

## 2025-08-04 NOTE — TELEPHONE ENCOUNTER
Rx Refill Request Telephone Encounter    Name:  Marianna Buck  : 1955     metoprolol succinate XL (Toprol-XL) 25 mg 24 hr tablet [[id number]]    Order Details  Dose: 25 mg Route: oral Frequency: Daily   Dispense Quantity: 90 tablet (90 day supply) Refills: 1 Fills remainin   Duration: -- Dispense As Written: No          Sig: Take 1 tablet (25 mg) by mouth once daily.       ALLERGIES:   SEE LIST    Specific Pharmacy location:  [facility]    Date of last appointment:  2025  Date of next appointment:  NONE    Best number to reach patient:  [phone]

## 2025-08-08 ENCOUNTER — TELEMEDICINE (OUTPATIENT)
Dept: PHARMACY | Facility: HOSPITAL | Age: 70
End: 2025-08-08
Payer: MEDICARE

## 2025-08-08 ENCOUNTER — PHARMACY VISIT (OUTPATIENT)
Dept: PHARMACY | Facility: CLINIC | Age: 70
End: 2025-08-08
Payer: COMMERCIAL

## 2025-08-08 DIAGNOSIS — E11.65 TYPE 2 DIABETES MELLITUS WITH HYPERGLYCEMIA, WITHOUT LONG-TERM CURRENT USE OF INSULIN: Primary | ICD-10-CM

## 2025-08-08 PROCEDURE — RXMED WILLOW AMBULATORY MEDICATION CHARGE

## 2025-08-08 NOTE — PROGRESS NOTES
Patient ID: Marianna Buck is a 69 y.o. female who presents for Diabetes   Pt is here for follow-up appointment.    Referring Provider: Tom Bingham DO   Last visit with PCP: 6/9/25  Next visit: not yet scheduled    Verbal consent to manage patient's drug therapy was obtained from patient. They were informed they may decline to participate or withdraw from participation in pharmacy services at any time. Patient is agreeable to detailed voice messages if unable to be reached.       Subjective   Treatment Adherence:   Patient did take medications today.   Number of missed doses in last 7 days: 0.  Why? N/A     [facility]  Can patient afford prescribed medications: Yes,       Interval History:  Has new Dexcom supplies, including reader which she is more reliably connecting with than previous Dexcom phone narendra  Last visit switched Ozempic to Mounjaro to try to minimize side effects    DIABETES MELLITUS TYPE 2:    Known diabetic complications: none.  Optometry exam current (within 1 year)  Most recent visit in Podiatry was on - 6/24/24     Current diabetic medications include:  Mounjaro 7.5mg weekly    No more nausea from her doses, although sometimes positional nausea- resolves when she shifts back    DEXCOM G7   GMI: 6.2%  Time in range: 94% 3-day; 96% for 14-day  Current 107    Previous visit:  - GMI is 6.8% - higher than previous low 6s    Any episodes of hypoglycemia? Yes, about 2-3% low  Did patient treat episode of hypoglycemia appropriately? N/A    Does pt have proteinuria? No, NONE FOUND   If appropriate, is patient on ACEi/ARB? N/A    Secondary Prevention  Statin? No, Patient refuses statin.  ACE-I/ARB? N/A.  Aspirin? Yes, 81mg daily.    Pertinent PMH Review:  PMH of Pancreatitis: No  PMH of Retinopathy: No  PMH of Urinary Tract Infections: Yes  PMH of MTC: No    Lifestyle:  Needs to eat more regularly, knows her lows are from large meal gaps  Lots of energy going towards taking  care of  Sha at the moment  Does not feel appetite is too low on medication alone, likes her current dose of Mounjaro better than previous Ozempic  Confident in correcting her glucose when it does go too low    Hx:  Current exercise: Housework, going out, up and down the stairs more, babysitting kids 1-2 days/week  Current diet: no changes    Objective     There were no vitals taken for this visit.     Labs  Lab Results   Component Value Date    BILITOT 0.4 01/13/2025    CALCIUM 9.0 01/13/2025    CO2 32 01/13/2025    CL 99 01/13/2025    CREATININE 0.83 01/13/2025    GLUCOSE 105 (H) 01/13/2025    ALKPHOS 74 01/13/2025    K 4.8 01/13/2025    PROT 7.3 01/13/2025     01/13/2025    AST 26 01/13/2025    ALT 27 01/13/2025    BUN 12 01/13/2025    ANIONGAP 11 01/13/2025    MG 2.00 06/04/2020    ALBUMIN 4.0 01/13/2025    LIPASE 55 09/29/2018    GFRF 88 03/31/2023     Lab Results   Component Value Date    TRIG 264 (H) 01/31/2024    CHOL 157 01/31/2024    LDLCALC 64 01/31/2024    HDL 40.6 01/31/2024     Lab Results   Component Value Date    HGBA1C 6.1 03/10/2025       Current Outpatient Medications   Medication Instructions    aspirin 81 mg    blood-glucose meter (Accu-Chek Guide Glucose Meter) misc Use daily as needed to test blood glucose against continuous monitor.    blood-glucose sensor (Dexcom G7 Sensor) device Place one sensor every 10 days on back of arm to check sugars.    brompheniramine-pseudoeph-DM 2-30-10 mg/5 mL syrup Take 10 mL by mouth every 4 to 6 hours if needed for cough or congestion.    celecoxib (CELEBREX) 200 mg, oral, 2 times daily Nitrate    docusate sodium (COLACE) 100 mg, 2 times daily    DULoxetine (CYMBALTA) 60 mg, oral, Daily    estradiol (ESTRACE) 0.5 mg, oral, Daily    fluorouracil (Efudex) 5 % cream cream Apply a thin layer to the affected areas on the face twice a day for two weeks (use first)    hydrocortisone 2.5 % cream Apply twice a day to face for 2 weeks after 5FU cream  for healing    hydroxychloroquine (Plaquenil) 200 mg tablet Take 1 tablet (200 mg) by mouth in the morning and in the evening.    levothyroxine (SYNTHROID, LEVOXYL) 50 mcg, oral, Daily    metoprolol succinate XL (TOPROL-XL) 25 mg, oral, Daily    metroNIDAZOLE (Metrogel) 1 % gel 1 Application, Daily    omeprazole (PRILOSEC) 40 mg, oral, Daily before breakfast    Ozempic 2 mg, subcutaneous, Once Weekly    pregabalin (Lyrica) 150 mg capsule Take 1 capsule (150 mg) by mouth 3 times a day (in the morning, evening, and before bed).    tirzepatide (Mounjaro) 7.5 mg/0.5 mL pen injector Inject 1 pen (7.5 mg) under the skin 1 (one) time per week.    traMADol (ULTRAM) 50 mg, oral, Every 6 hours PRN    triamterene-hydrochlorothiazid (Maxzide-25) 37.5-25 mg tablet 1 tablet, oral, Daily before breakfast     Assessment/Plan   Problem List Items Addressed This Visit           ICD-10-CM    Type 2 diabetes mellitus with hyperglycemia, without long-term current use of insulin - Primary E11.65    Patient's goal A1c is < 7%.  Is pt at goal? Yes, 6.1%  Patient's SMBGs are controlled, with a few lows throughout the week.     Rationale for plan: Marianna is feeling much better on current Mounjaro than previous n/v to Ozempic 2 mg. She is happy with current dose. Any lows she attributes to larger gaps in meals. Okay to continue current dose for now. If frequent lows continue, can considering lowering Mounjaro to 5 mg weekly    Medication Changes:  CONTINUE  Mounjaro 7.5 mg weekly         Relevant Orders    Referral to Clinical Pharmacy       Immunizations needed: DTaP, Influenza, and COVD, RSV    Labs ordered:  none     Referrals:  none     Follow-up: 8/22 11:20 (11:40 for Sha)     Patient was provided with PharmD phone number and encouraged to reach out with any questions or concerns Prior to next appointment or ask provider for another pharmacy referral.    Thank you for allowing to take part in the care of this patient.    Darline ROJAS  Gopi, PharmD  Clinical Pharmacist  [phone]    Continue all meds under the continuation of care with the referring provider and clinical pharmacy team.    Verbal consent to manage patient's drug therapy was obtained from the patient. They were informed they may decline to participate or withdraw from participation in pharmacy services at any time.

## 2025-08-08 NOTE — ASSESSMENT & PLAN NOTE
Patient's goal A1c is < 7%.  Is pt at goal? Yes, 6.1%  Patient's SMBGs are controlled, with a few lows throughout the week.     Rationale for plan: Marianna is feeling much better on current Mounjaro than previous n/v to Ozempic 2 mg. She is happy with current dose. Any lows she attributes to larger gaps in meals. Okay to continue current dose for now. If frequent lows continue, can considering lowering Mounjaro to 5 mg weekly    Medication Changes:  CONTINUE  Mounjaro 7.5 mg weekly

## 2025-08-11 PROCEDURE — RXMED WILLOW AMBULATORY MEDICATION CHARGE

## 2025-08-16 ENCOUNTER — PHARMACY VISIT (OUTPATIENT)
Dept: PHARMACY | Facility: CLINIC | Age: 70
End: 2025-08-16
Payer: COMMERCIAL

## 2025-08-22 ENCOUNTER — APPOINTMENT (OUTPATIENT)
Dept: PHARMACY | Facility: HOSPITAL | Age: 70
End: 2025-08-22
Payer: MEDICARE

## 2025-08-23 DIAGNOSIS — M79.7 FIBROMYALGIA: ICD-10-CM

## 2025-08-23 PROCEDURE — RXMED WILLOW AMBULATORY MEDICATION CHARGE

## 2025-08-25 RX ORDER — DULOXETIN HYDROCHLORIDE 60 MG/1
60 CAPSULE, DELAYED RELEASE ORAL DAILY
Qty: 90 CAPSULE | Refills: 1 | Status: SHIPPED | OUTPATIENT
Start: 2025-08-25

## 2025-08-26 ENCOUNTER — PHARMACY VISIT (OUTPATIENT)
Dept: PHARMACY | Facility: CLINIC | Age: 70
End: 2025-08-26
Payer: COMMERCIAL

## 2025-08-26 PROCEDURE — RXMED WILLOW AMBULATORY MEDICATION CHARGE

## 2025-09-04 ENCOUNTER — TELEMEDICINE (OUTPATIENT)
Dept: PHARMACY | Facility: HOSPITAL | Age: 70
End: 2025-09-04
Payer: MEDICARE

## 2025-09-04 DIAGNOSIS — E11.65 TYPE 2 DIABETES MELLITUS WITH HYPERGLYCEMIA, WITHOUT LONG-TERM CURRENT USE OF INSULIN: ICD-10-CM

## 2025-09-08 ENCOUNTER — APPOINTMENT (OUTPATIENT)
Dept: PRIMARY CARE | Facility: CLINIC | Age: 70
End: 2025-09-08
Payer: MEDICARE

## 2025-10-02 ENCOUNTER — APPOINTMENT (OUTPATIENT)
Dept: PHARMACY | Facility: HOSPITAL | Age: 70
End: 2025-10-02
Payer: MEDICARE

## (undated) DEVICE — SUTURE PERMAHAND SZ 5-0 L18IN NONABSORBABLE BLK L13MM P-3 640G

## (undated) DEVICE — SUTURE NONABSORBABLE MONOFILAMENT 4-0 P-3 18 IN ETHILON 699H

## (undated) DEVICE — PACK,BASIC: Brand: MEDLINE

## (undated) DEVICE — LABEL MED MINI W/ MARKER

## (undated) DEVICE — SUTURE PROL SZ 6-0 L18IN NONABSORBABLE BLU P-3 L13MM 3/8 8695G

## (undated) DEVICE — GOWN,AURORA,NONREINFORCED,LARGE: Brand: MEDLINE

## (undated) DEVICE — NEPTUNE E-SEP SMOKE EVACUATION PENCIL, COATED, 70MM BLADE, PUSH BUTTON SWITCH: Brand: NEPTUNE E-SEP

## (undated) DEVICE — DERMATOME BLADES: Brand: DERMATOME

## (undated) DEVICE — SINGLE PORT MANIFOLD: Brand: NEPTUNE 2

## (undated) DEVICE — SPONGE GZ W4XL4IN RAYON POLY CVR W/NONWOVEN FAB STRL 2/PK

## (undated) DEVICE — SPONGE,LAP,18"X18",DLX,XR,ST,5/PK,40/PK: Brand: MEDLINE

## (undated) DEVICE — 3M™ TEGADERM™ TRANSPARENT FILM DRESSING FRAME STYLE, 1624W, 2-3/8 IN X 2-3/4 IN (6 CM X 7 CM), 100/CT 4CT/CASE: Brand: 3M™ TEGADERM™

## (undated) DEVICE — DRESSING,GAUZE,XEROFORM,CURAD,5"X9",ST: Brand: CURAD

## (undated) DEVICE — COVER LT HNDL BLU PLAS

## (undated) DEVICE — NEEDLE HYPO 30GA L1IN BGE POLYPR HUB S STL REG BVL STR W/O

## (undated) DEVICE — GAUZE,SPONGE,4"X4",16PLY,XRAY,STRL,LF: Brand: MEDLINE

## (undated) DEVICE — GLOVE ORTHO 7 1/2   MSG9475

## (undated) DEVICE — Z DISCONTINUED NO SUB SUTURE CHROMIC GUT SZ 5-0 L18IN ABSRB BRN P-3 L13MM 3/8 CIR 687G

## (undated) DEVICE — TOWEL,OR,DSP,ST,BLUE,STD,4/PK,20PK/CS: Brand: MEDLINE

## (undated) DEVICE — SYRINGE IRRIG 60ML SFT PLIABLE BLB EZ TO GRP 1 HND USE W/

## (undated) DEVICE — ELECTRODE ES L2IN MEGAFINE NDL MEGADYNE

## (undated) DEVICE — TUBING, SUCTION, 9/32" X 12', STRAIGHT: Brand: MEDLINE INDUSTRIES, INC.

## (undated) DEVICE — GOWN,SIRUS,NONRNF,SETINSLV,2XL,18/CS: Brand: MEDLINE

## (undated) DEVICE — SUTURE MONOCRYL SZ 3-0 L27IN ABSRB UD L17MM RB-1 1/2 CIR Y215H

## (undated) DEVICE — 1010 S-DRAPE TOWEL DRAPE 10/BX: Brand: STERI-DRAPE™

## (undated) DEVICE — MARKER SURG SKIN GENTIAN VLT REG TIP W/ 6IN RUL DYNJSM01

## (undated) DEVICE — NEEDLE HYPO 25GA L1.5IN BLU POLYPR HUB S STL REG BVL STR

## (undated) DEVICE — SUCKER CARD L9IN 0.25IN CONN MINI RIG SFT TIP W/ SIDE PRT

## (undated) DEVICE — SKIN PREP TRAY 4 COMPARTM TRAY: Brand: MEDLINE INDUSTRIES, INC.

## (undated) DEVICE — COUNTER NDL 40 COUNT HLD 70 FOAM BLK ADH W/ MAG

## (undated) DEVICE — SUTURE ETHILON SZ 4-0 L18IN NONABSORBABLE BLK L13MM P-3 3/8 699G

## (undated) DEVICE — SUTURE MONOCRYL SZ 4-0 L27IN ABSRB UD L19MM PS-2 1/2 CIR PRIM Y426H

## (undated) DEVICE — CANNULA PERF L2IN BLNT TIP 3MM VES CLR RADPQ BODY FEM LUER D

## (undated) DEVICE — ELECTRODE PT RET AD L9FT HI MOIST COND ADH HYDRGEL CORDED

## (undated) DEVICE — SUTURE MONOCRYL SZ 4-0 L18IN ABSRB UD P-3 L13MM 3/8 CIR PRIM Y494G

## (undated) DEVICE — BLADE,CARBON-STEEL,15,STRL,DISPOSABLE,TB: Brand: MEDLINE

## (undated) DEVICE — SHEET, DRAPE, SPLIT, STERILE: Brand: MEDLINE

## (undated) DEVICE — PAD,NON-ADHERENT,3X8,STERILE,LF,1/PK: Brand: MEDLINE

## (undated) DEVICE — SYRINGE MED 10ML TRNSLUC BRL PLUNG BLK MRK POLYPR CTRL

## (undated) DEVICE — GAUZE,SPONGE,2"X2",8PLY,STERILE,LF,2'S: Brand: MEDLINE

## (undated) DEVICE — SUTURE VICRYL SZ 0 L27IN ABSRB UD L26MM CP-2 1/2 CIR SGL J870H

## (undated) DEVICE — Z DISCONTINUED NO SUB SUTURE CHROMIC GUT SZ 4-0 L18IN ABSRB BRN L19MM PS-2 3/8 1637G

## (undated) DEVICE — SYRINGE MED 30ML STD CLR PLAS LUERLOCK TIP N CTRL DISP